# Patient Record
Sex: FEMALE | Race: WHITE | NOT HISPANIC OR LATINO | Employment: PART TIME | URBAN - METROPOLITAN AREA
[De-identification: names, ages, dates, MRNs, and addresses within clinical notes are randomized per-mention and may not be internally consistent; named-entity substitution may affect disease eponyms.]

---

## 2019-05-24 ENCOUNTER — APPOINTMENT (OUTPATIENT)
Dept: OTHER | Facility: HOSPITAL | Age: 39
End: 2019-05-24

## 2019-05-24 ENCOUNTER — APPOINTMENT (OUTPATIENT)
Dept: ONCOLOGY | Facility: CLINIC | Age: 39
End: 2019-05-24

## 2019-06-21 ENCOUNTER — CONSULT (OUTPATIENT)
Dept: ONCOLOGY | Facility: CLINIC | Age: 39
End: 2019-06-21

## 2019-06-21 ENCOUNTER — LAB (OUTPATIENT)
Dept: OTHER | Facility: HOSPITAL | Age: 39
End: 2019-06-21

## 2019-06-21 VITALS
TEMPERATURE: 98.2 F | BODY MASS INDEX: 22.47 KG/M2 | OXYGEN SATURATION: 99 % | WEIGHT: 151.7 LBS | RESPIRATION RATE: 14 BRPM | SYSTOLIC BLOOD PRESSURE: 113 MMHG | HEIGHT: 69 IN | HEART RATE: 78 BPM | DIASTOLIC BLOOD PRESSURE: 78 MMHG

## 2019-06-21 DIAGNOSIS — E83.110 HEREDITARY HEMOCHROMATOSIS (HCC): Primary | ICD-10-CM

## 2019-06-21 DIAGNOSIS — E83.110 HEMOCHROMATOSIS ASSOCIATED WITH MUTATION IN HFE GENE (HCC): Primary | ICD-10-CM

## 2019-06-21 LAB
ALBUMIN SERPL-MCNC: 4.4 G/DL (ref 3.5–5.2)
ALBUMIN/GLOB SERPL: 1.5 G/DL
ALP SERPL-CCNC: 56 U/L (ref 39–117)
ALT SERPL W P-5'-P-CCNC: 11 U/L (ref 1–33)
ANION GAP SERPL CALCULATED.3IONS-SCNC: 7.1 MMOL/L
AST SERPL-CCNC: 13 U/L (ref 1–32)
BASOPHILS # BLD AUTO: 0.04 10*3/MM3 (ref 0–0.2)
BASOPHILS NFR BLD AUTO: 0.7 % (ref 0–1.5)
BILIRUB SERPL-MCNC: 0.3 MG/DL (ref 0.1–1.2)
BUN BLD-MCNC: 10 MG/DL (ref 6–20)
BUN/CREAT SERPL: 11.8 (ref 7–25)
CALCIUM SPEC-SCNC: 9.6 MG/DL (ref 8.6–10.5)
CHLORIDE SERPL-SCNC: 105 MMOL/L (ref 98–107)
CO2 SERPL-SCNC: 27.9 MMOL/L (ref 22–29)
CREAT BLD-MCNC: 0.85 MG/DL (ref 0.57–1)
DEPRECATED RDW RBC AUTO: 40.2 FL (ref 37–54)
EOSINOPHIL # BLD AUTO: 0.28 10*3/MM3 (ref 0–0.4)
EOSINOPHIL NFR BLD AUTO: 4.9 % (ref 0.3–6.2)
ERYTHROCYTE [DISTWIDTH] IN BLOOD BY AUTOMATED COUNT: 12.5 % (ref 12.3–15.4)
FERRITIN SERPL-MCNC: 51.9 NG/ML (ref 13–150)
FOLATE SERPL-MCNC: 6.5 NG/ML (ref 4.78–24.2)
GFR SERPL CREATININE-BSD FRML MDRD: 75 ML/MIN/1.73
GLOBULIN UR ELPH-MCNC: 3 GM/DL
GLUCOSE BLD-MCNC: 92 MG/DL (ref 65–99)
HCT VFR BLD AUTO: 39.5 % (ref 34–46.6)
HGB BLD-MCNC: 13.5 G/DL (ref 12–15.9)
HGB RETIC QN AUTO: 33.9 PG (ref 29.8–36.1)
IMM GRANULOCYTES # BLD AUTO: 0.02 10*3/MM3 (ref 0–0.05)
IMM GRANULOCYTES NFR BLD AUTO: 0.3 % (ref 0–0.5)
IMM RETICS NFR: 6.8 % (ref 3–15.8)
IRON 24H UR-MRATE: 93 MCG/DL (ref 37–145)
IRON SATN MFR SERPL: 40 % (ref 20–50)
LYMPHOCYTES # BLD AUTO: 1.75 10*3/MM3 (ref 0.7–3.1)
LYMPHOCYTES NFR BLD AUTO: 30.5 % (ref 19.6–45.3)
MCH RBC QN AUTO: 29.8 PG (ref 26.6–33)
MCHC RBC AUTO-ENTMCNC: 34.2 G/DL (ref 31.5–35.7)
MCV RBC AUTO: 87.2 FL (ref 79–97)
MONOCYTES # BLD AUTO: 0.45 10*3/MM3 (ref 0.1–0.9)
MONOCYTES NFR BLD AUTO: 7.9 % (ref 5–12)
NEUTROPHILS # BLD AUTO: 3.19 10*3/MM3 (ref 1.7–7)
NEUTROPHILS NFR BLD AUTO: 55.7 % (ref 42.7–76)
NRBC BLD AUTO-RTO: 0 /100 WBC (ref 0–0.2)
PLATELET # BLD AUTO: 207 10*3/MM3 (ref 140–450)
PMV BLD AUTO: 10.6 FL (ref 6–12)
POTASSIUM BLD-SCNC: 5.1 MMOL/L (ref 3.5–5.2)
PROT SERPL-MCNC: 7.4 G/DL (ref 6–8.5)
RBC # BLD AUTO: 4.53 10*6/MM3 (ref 3.77–5.28)
RETICS/RBC NFR AUTO: 1.17 % (ref 0.7–1.9)
SODIUM BLD-SCNC: 140 MMOL/L (ref 136–145)
TIBC SERPL-MCNC: 235 MCG/DL (ref 298–536)
TRANSFERRIN SERPL-MCNC: 158 MG/DL (ref 200–360)
VIT B12 BLD-MCNC: 509 PG/ML (ref 211–946)
WBC NRBC COR # BLD: 5.73 10*3/MM3 (ref 3.4–10.8)

## 2019-06-21 PROCEDURE — 82746 ASSAY OF FOLIC ACID SERUM: CPT | Performed by: INTERNAL MEDICINE

## 2019-06-21 PROCEDURE — 99244 OFF/OP CNSLTJ NEW/EST MOD 40: CPT | Performed by: INTERNAL MEDICINE

## 2019-06-21 PROCEDURE — 80053 COMPREHEN METABOLIC PANEL: CPT | Performed by: INTERNAL MEDICINE

## 2019-06-21 PROCEDURE — 82607 VITAMIN B-12: CPT | Performed by: INTERNAL MEDICINE

## 2019-06-21 PROCEDURE — 82728 ASSAY OF FERRITIN: CPT | Performed by: INTERNAL MEDICINE

## 2019-06-21 PROCEDURE — 84466 ASSAY OF TRANSFERRIN: CPT | Performed by: INTERNAL MEDICINE

## 2019-06-21 PROCEDURE — 36415 COLL VENOUS BLD VENIPUNCTURE: CPT

## 2019-06-21 PROCEDURE — 85025 COMPLETE CBC W/AUTO DIFF WBC: CPT | Performed by: INTERNAL MEDICINE

## 2019-06-21 PROCEDURE — 85046 RETICYTE/HGB CONCENTRATE: CPT | Performed by: INTERNAL MEDICINE

## 2019-06-21 PROCEDURE — 83540 ASSAY OF IRON: CPT | Performed by: INTERNAL MEDICINE

## 2019-06-21 RX ORDER — ASCORBIC ACID 500 MG
1 TABLET ORAL DAILY
COMMUNITY
End: 2020-07-10

## 2019-06-21 RX ORDER — FLUOXETINE HYDROCHLORIDE 40 MG/1
40 CAPSULE ORAL EVERY MORNING
COMMUNITY

## 2019-06-21 RX ORDER — GABAPENTIN 100 MG/1
200 CAPSULE ORAL NIGHTLY
Refills: 2 | COMMUNITY
Start: 2019-04-30 | End: 2022-09-19

## 2019-06-21 RX ORDER — BUTALBITAL, ACETAMINOPHEN AND CAFFEINE 300; 40; 50 MG/1; MG/1; MG/1
1 CAPSULE ORAL EVERY 4 HOURS PRN
COMMUNITY

## 2019-06-21 RX ORDER — TRAZODONE HYDROCHLORIDE 50 MG/1
50 TABLET ORAL EVERY EVENING
Refills: 2 | COMMUNITY
Start: 2019-03-29 | End: 2020-07-10

## 2019-06-21 RX ORDER — PROPRANOLOL HYDROCHLORIDE 10 MG/1
10 TABLET ORAL
Refills: 3 | COMMUNITY
Start: 2019-04-11 | End: 2020-07-10

## 2019-06-21 NOTE — PROGRESS NOTES
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    HISTORY OF PRESENT ILLNESS:  The patient is a 39 y.o. year old female who is here for an opinion about the above issue.  And undergone a hemochromatosis gene mutation assay at [facility] in the past and was found to be homozygous for the C282Y mutation.  In spite of this her iron stores did not appear to be significantly abnormal and after just a couple of phlebotomies she became symptomatically iron deficient with a ferritin of 14 in March of this year.  Her hemoglobin also declined to 11-1/2 g/dL.    She is here today for a second opinion and her hemoglobin now is back in the normal range at 13.5 g/dL.  We are drawing repeat iron studies today but these are pending.  She is still premenopausal with periods lasting 7 to 8 days although the flow and schedule can be erratic.  She does not drink alcohol.  She is not aware of any family history of insulin-dependent diabetes or cirrhosis of the liver except for her paternal grandfather who was also an alcoholic.    History of Present Illness     Past Medical History:   Diagnosis Date   • Anxiety    • Chronic cholecystitis    • Depression    • Hereditary hemochromatosis (CMS/HCC)    • History of chicken pox    • History of IBS    • History of migraines    • Latex allergy    • Osteoporosis         Past Surgical History:   Procedure Laterality Date   •  SECTION  ,    • CHOLECYSTECTOMY     • INNER EAR SURGERY Bilateral ,     Tympanoplasty   • TYMPANOPLASTY Bilateral         Current Outpatient Medications on File Prior to Visit   Medication Sig  Dispense Refill   • ASPIRIN 81 PO Take  by mouth.     • butalbital-acetaminophen-caffeine (FIORICET) -40 MG capsule capsule Take 1 capsule by mouth Every 4 (Four) Hours As Needed.     • FLUoxetine (PROzac) 40 MG capsule Take 40 mg by mouth Daily.     • gabapentin (NEURONTIN) 100 MG capsule Take 100 mg by mouth Every Night.  2   • Multiple Vitamin (MULTIVITAMIN) tablet tablet Take 1 tablet by mouth Daily.     • propranolol (INDERAL) 10 MG tablet Take 10 mg by mouth every night at bedtime.  3   • traZODone (DESYREL) 50 MG tablet Take 50 mg by mouth Every Evening.  2     No current facility-administered medications on file prior to visit.         ALLERGIES:    Allergies   Allergen Reactions   • Mefenamic Acid Swelling     Facial swelling   • Latex Other (See Comments)     sensitive   • Sulfa Antibiotics Rash     Had at a young age vomiting and rash  rash & vomting          Social History     Socioeconomic History   • Marital status:      Spouse name: Not on file   • Number of children: Not on file   • Years of education: Not on file   • Highest education level: Not on file   Occupational History     Employer: [employer]   Tobacco Use   • Smoking status: Former Smoker     Years: 10.00   • Smokeless tobacco: Never Used   Substance and Sexual Activity   • Alcohol use: No     Frequency: Never   • Drug use: No   • Sexual activity: Defer        Family History   Problem Relation Age of Onset   • Hypertension Mother    • Hypertension Father    • Cancer Maternal Grandfather         thyroid        Review of Systems   Constitutional: Positive for fatigue. Negative for activity change, chills and fever.   HENT: Positive for hearing loss. Negative for mouth sores, trouble swallowing and voice change.    Eyes: Negative for pain and visual disturbance.   Respiratory: Negative for cough, shortness of breath and wheezing.    Cardiovascular: Negative for chest pain and palpitations.   Gastrointestinal: Positive for  "constipation. Negative for abdominal pain, diarrhea, nausea and vomiting.   Genitourinary: Negative for difficulty urinating, frequency and urgency.   Musculoskeletal: Positive for arthralgias and joint swelling.   Skin: Negative for rash.   Neurological: Positive for headaches. Negative for dizziness, seizures and weakness.   Hematological: Negative for adenopathy. Does not bruise/bleed easily.   Psychiatric/Behavioral: Negative for behavioral problems and confusion. The patient is not nervous/anxious.         Objective     Vitals:    06/21/19 0911   BP: 113/78   Pulse: 78   Resp: 14   Temp: 98.2 °F (36.8 °C)   SpO2: 99%   Weight: 68.8 kg (151 lb 11.2 oz)   Height: 174 cm (68.5\")   PainSc:   2   PainLoc: Comment: Joint pain, hips/kness/back, always.     Current Status 6/21/2019   ECOG score 0       Physical Exam   Constitutional: She is oriented to person, place, and time. She appears well-developed and well-nourished. No distress.   HENT:   Head: Normocephalic.   Eyes: Conjunctivae and EOM are normal. Pupils are equal, round, and reactive to light. No scleral icterus.   Neck: Normal range of motion. Neck supple. No JVD present. No thyromegaly present.   Cardiovascular: Normal rate and regular rhythm. Exam reveals no gallop and no friction rub.   No murmur heard.  Pulmonary/Chest: Effort normal and breath sounds normal. She has no wheezes. She has no rales.   Abdominal: Soft. She exhibits no distension and no mass. There is no tenderness.   Musculoskeletal: Normal range of motion. She exhibits no edema or deformity.   Lymphadenopathy:     She has no cervical adenopathy.   Neurological: She is alert and oriented to person, place, and time. She has normal reflexes. No cranial nerve deficit.   Skin: Skin is warm and dry. No rash noted. No erythema.   Psychiatric: She has a normal mood and affect. Her behavior is normal. Judgment normal.         RECENT LABS:  Hematology WBC   Date Value Ref Range Status   06/21/2019 " 5.73 3.40 - 10.80 10*3/mm3 Final   03/12/2019 5.88 4.5 - 11.0 10*3/uL Final   02/20/2019 6.63 4.5 - 11.0 10*3/uL Final     RBC   Date Value Ref Range Status   06/21/2019 4.53 3.77 - 5.28 10*6/mm3 Final   03/12/2019 3.99 (L) 4.0 - 5.2 10*6/uL Final     Hemoglobin   Date Value Ref Range Status   06/21/2019 13.5 12.0 - 15.9 g/dL Final   03/12/2019 11.9 (L) 12.0 - 16.0 g/dL Final   02/06/2019 11.7 (L) 12.0 - 16.0 g/dL Final     Hematocrit   Date Value Ref Range Status   06/21/2019 39.5 34.0 - 46.6 % Final   03/12/2019 36.3 36.0 - 46.0 % Final     Platelets   Date Value Ref Range Status   06/21/2019 207 140 - 450 10*3/mm3 Final   03/12/2019 191 140 - 440 10*3/uL Final   02/06/2019 185 140 - 440 10*3/uL Final      No results found for: IRON, TIBC, FERRITIN      Assessment/Plan   1.  Patient is homozygous for the C282Y hemochromatosis mutation but does not have clinical evidence of significant iron overload.  We explained to the patient that not everyone who is homozygous for this mutation has clinical hemochromatosis although it is also possible that her monthly periods may be helping to keep her iron status and check.  It is possible that her iron storage may be more significantly impacted when she is postmenopausal.  2.  Iron deficiency anemia from prior phlebotomy.  Her anemia has resolved with a hemoglobin today of 13.5.  Her repeat iron studies are pending  3.  Chronic arthralgias.  I explained to the patient that I do not think this is related to iron deposition since she does not have other signs of significant iron overload.    Recommendations  1.  We will repeat her iron studies today as well as other anemia labs including B12 and folate levels and a retic count.  2.  We will plan to see the patient back in our office in 4 to 6 weeks for repeat blood count and repeat iron studies.  3.  For now we do not have any plans to reinitiate therapeutic phlebotomy but feel that we should continue to observe this for now  checking her ferritin level and iron profile every few months.  4.  We spent a good deal of the visit today also discussing the genetic implications of this disease and the fact that her siblings and her children should be tested at some point.  We also mentioned that if they do not want to undergo the genetic testing that their physicians could check a ferritin level as a screening test.    Thanks for allowing us to see this nice patient in consultation.

## 2019-07-26 ENCOUNTER — OFFICE VISIT (OUTPATIENT)
Dept: ONCOLOGY | Facility: CLINIC | Age: 39
End: 2019-07-26

## 2019-07-26 ENCOUNTER — LAB (OUTPATIENT)
Dept: OTHER | Facility: HOSPITAL | Age: 39
End: 2019-07-26

## 2019-07-26 VITALS
HEART RATE: 87 BPM | OXYGEN SATURATION: 99 % | TEMPERATURE: 97.8 F | WEIGHT: 151.4 LBS | DIASTOLIC BLOOD PRESSURE: 84 MMHG | RESPIRATION RATE: 16 BRPM | SYSTOLIC BLOOD PRESSURE: 118 MMHG | BODY MASS INDEX: 22.42 KG/M2 | HEIGHT: 69 IN

## 2019-07-26 DIAGNOSIS — E83.110 HEREDITARY HEMOCHROMATOSIS (HCC): Primary | ICD-10-CM

## 2019-07-26 PROBLEM — E83.119 HEMOCHROMATOSIS: Status: ACTIVE | Noted: 2019-07-26

## 2019-07-26 LAB
BASOPHILS # BLD AUTO: 0.03 10*3/MM3 (ref 0–0.2)
BASOPHILS NFR BLD AUTO: 0.5 % (ref 0–1.5)
DEPRECATED RDW RBC AUTO: 43.3 FL (ref 37–54)
EOSINOPHIL # BLD AUTO: 0.28 10*3/MM3 (ref 0–0.4)
EOSINOPHIL NFR BLD AUTO: 4.7 % (ref 0.3–6.2)
ERYTHROCYTE [DISTWIDTH] IN BLOOD BY AUTOMATED COUNT: 13.1 % (ref 12.3–15.4)
FERRITIN SERPL-MCNC: 39.2 NG/ML (ref 13–150)
HCT VFR BLD AUTO: 39 % (ref 34–46.6)
HGB BLD-MCNC: 13 G/DL (ref 12–15.9)
IMM GRANULOCYTES # BLD AUTO: 0.01 10*3/MM3 (ref 0–0.05)
IMM GRANULOCYTES NFR BLD AUTO: 0.2 % (ref 0–0.5)
IRON 24H UR-MRATE: 108 MCG/DL (ref 37–145)
IRON SATN MFR SERPL: 47 % (ref 20–50)
LYMPHOCYTES # BLD AUTO: 1.38 10*3/MM3 (ref 0.7–3.1)
LYMPHOCYTES NFR BLD AUTO: 23 % (ref 19.6–45.3)
MCH RBC QN AUTO: 30.2 PG (ref 26.6–33)
MCHC RBC AUTO-ENTMCNC: 33.3 G/DL (ref 31.5–35.7)
MCV RBC AUTO: 90.5 FL (ref 79–97)
MONOCYTES # BLD AUTO: 0.42 10*3/MM3 (ref 0.1–0.9)
MONOCYTES NFR BLD AUTO: 7 % (ref 5–12)
NEUTROPHILS # BLD AUTO: 3.88 10*3/MM3 (ref 1.7–7)
NEUTROPHILS NFR BLD AUTO: 64.6 % (ref 42.7–76)
NRBC BLD AUTO-RTO: 0 /100 WBC (ref 0–0.2)
PLATELET # BLD AUTO: 173 10*3/MM3 (ref 140–450)
PMV BLD AUTO: 10.6 FL (ref 6–12)
RBC # BLD AUTO: 4.31 10*6/MM3 (ref 3.77–5.28)
TIBC SERPL-MCNC: 229 MCG/DL (ref 298–536)
TRANSFERRIN SERPL-MCNC: 154 MG/DL (ref 200–360)
WBC NRBC COR # BLD: 6 10*3/MM3 (ref 3.4–10.8)

## 2019-07-26 PROCEDURE — 85025 COMPLETE CBC W/AUTO DIFF WBC: CPT | Performed by: INTERNAL MEDICINE

## 2019-07-26 PROCEDURE — 82728 ASSAY OF FERRITIN: CPT | Performed by: INTERNAL MEDICINE

## 2019-07-26 PROCEDURE — 36415 COLL VENOUS BLD VENIPUNCTURE: CPT

## 2019-07-26 PROCEDURE — 99214 OFFICE O/P EST MOD 30 MIN: CPT | Performed by: INTERNAL MEDICINE

## 2019-07-26 PROCEDURE — 84466 ASSAY OF TRANSFERRIN: CPT | Performed by: INTERNAL MEDICINE

## 2019-07-26 PROCEDURE — 83540 ASSAY OF IRON: CPT | Performed by: INTERNAL MEDICINE

## 2019-07-26 RX ORDER — FEXOFENADINE HYDROCHLORIDE 60 MG/1
60 TABLET, FILM COATED ORAL AS NEEDED
COMMUNITY
End: 2020-07-21

## 2019-07-26 NOTE — PROGRESS NOTES
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 year old female who is here for an opinion about the above issue.  And undergone a hemochromatosis gene mutation assay at [facility] in the past and was found to be homozygous for the C282Y mutation.  In spite of this her iron stores did not appear to be significantly abnormal and after just a couple of phlebotomies she became symptomatically iron deficient with a ferritin of 14 in March of this year.  Her hemoglobin also declined to 11-1/2 g/dL.    She is here today for a second opinion and her hemoglobin now is back in the normal range at 13.5 g/dL.  We are drawing repeat iron studies today but these are pending.  She is still premenopausal with periods lasting 7 to 8 days although the flow and schedule can be erratic.  She does not drink alcohol.  She is not aware of any family history of insulin-dependent diabetes or cirrhosis of the liver except for her paternal grandfather who was also an alcoholic.    History of Present Illness     Past Medical History:   Diagnosis Date   • Anxiety    • Chronic cholecystitis    • Depression    • Hereditary hemochromatosis (CMS/HCC)    • History of chicken pox    • History of IBS    • History of migraines    • [place] (hard of hearing)     Due to ear history, surgery x4   • Latex allergy    • Osteoarthritis    • Osteoporosis    • Tattoos         Past Surgical History:   Procedure Laterality Date   • ABDOMINAL WALL SURGERY  2017    Endometrioma removed from abdominal wall/ scar   •  SECTION  ,    • CHOLECYSTECTOMY     • INNER EAR SURGERY Bilateral ,     Tympanoplasty   • TYMPANOPLASTY Bilateral         Current Outpatient Medications on File Prior to Visit   Medication Sig Dispense Refill   • ASPIRIN 81 PO Take  by mouth.     •  butalbital-acetaminophen-caffeine (FIORICET) -40 MG capsule capsule Take 1 capsule by mouth Every 4 (Four) Hours As Needed.     • fexofenadine (ALLEGRA) 60 MG tablet Take  by mouth.     • FLUoxetine (PROzac) 40 MG capsule Take 40 mg by mouth Daily.     • gabapentin (NEURONTIN) 100 MG capsule Take 100 mg by mouth Every Night.  2   • propranolol (INDERAL) 10 MG tablet Take 10 mg by mouth every night at bedtime.  3   • traZODone (DESYREL) 50 MG tablet Take 50 mg by mouth Every Evening.  2   • Multiple Vitamin (MULTIVITAMIN) tablet tablet Take 1 tablet by mouth Daily.       No current facility-administered medications on file prior to visit.         ALLERGIES:    Allergies   Allergen Reactions   • Mefenamic Acid Swelling     Facial swelling   • Latex Other (See Comments)     sensitive   • Sulfa Antibiotics Rash     Had at a young age vomiting and rash  rash & vomting          Social History     Socioeconomic History   • Marital status:      Spouse name: Storm   • Number of children: 2   • Years of education: College   • Highest education level: Not on file   Occupational History     Employer: [employer]   • Occupation: Surgery Coordinator     Employer: [employer]   Tobacco Use   • Smoking status: Former Smoker     Packs/day: 0.50     Years: 10.00     Pack years: 5.00     Types: Cigarettes   • Smokeless tobacco: Never Used   Substance and Sexual Activity   • Alcohol use: No     Frequency: Never   • Drug use: No   • Sexual activity: Defer        Family History   Problem Relation Age of Onset   • Hypertension Mother    • Hypertension Father    • Cancer Maternal Grandfather         thyroid        Review of Systems   Constitutional: Positive for fatigue. Negative for activity change, chills and fever.   HENT: Positive for hearing loss. Negative for mouth sores, trouble swallowing and voice change.    Eyes: Negative for pain and visual disturbance.   Respiratory: Negative for cough, shortness of  "breath and wheezing.    Cardiovascular: Negative for chest pain and palpitations.   Gastrointestinal: Positive for constipation. Negative for abdominal pain, diarrhea, nausea and vomiting.   Genitourinary: Negative for difficulty urinating, frequency and urgency.   Musculoskeletal: Positive for arthralgias and joint swelling.   Skin: Negative for rash.   Neurological: Positive for headaches. Negative for dizziness, seizures and weakness.   Hematological: Negative for adenopathy. Does not bruise/bleed easily.   Psychiatric/Behavioral: Negative for behavioral problems and confusion. The patient is not nervous/anxious.         Objective     Vitals:    07/26/19 1129   BP: 118/84   Pulse: 87   Resp: 16   Temp: 97.8 °F (36.6 °C)   TempSrc: Oral   SpO2: 99%   Weight: 68.7 kg (151 lb 6.4 oz)   Height: 174 cm (68.5\")   PainSc: 0-No pain     Current Status 7/26/2019   ECOG score 0       Physical Exam   Constitutional: She is oriented to person, place, and time. She appears well-developed and well-nourished. No distress.   HENT:   Head: Normocephalic.   Eyes: Conjunctivae and EOM are normal. Pupils are equal, round, and reactive to light. No scleral icterus.   Neck: Normal range of motion. Neck supple. No JVD present. No thyromegaly present.   Cardiovascular: Normal rate and regular rhythm. Exam reveals no gallop and no friction rub.   No murmur heard.  Pulmonary/Chest: Effort normal and breath sounds normal. She has no wheezes. She has no rales.   Abdominal: Soft. She exhibits no distension and no mass. There is no tenderness.   Musculoskeletal: Normal range of motion. She exhibits no edema or deformity.   Lymphadenopathy:     She has no cervical adenopathy.   Neurological: She is alert and oriented to person, place, and time. She has normal reflexes. No cranial nerve deficit.   Skin: Skin is warm and dry. No rash noted. No erythema.   Psychiatric: She has a normal mood and affect. Her behavior is normal. Judgment normal.     "     RECENT LABS:  Hematology WBC   Date Value Ref Range Status   07/26/2019 6.00 3.40 - 10.80 10*3/mm3 Final   03/12/2019 5.88 4.5 - 11.0 10*3/uL Final   02/20/2019 6.63 4.5 - 11.0 10*3/uL Final     RBC   Date Value Ref Range Status   07/26/2019 4.31 3.77 - 5.28 10*6/mm3 Final   03/12/2019 3.99 (L) 4.0 - 5.2 10*6/uL Final     Hemoglobin   Date Value Ref Range Status   07/26/2019 13.0 12.0 - 15.9 g/dL Final   03/12/2019 11.9 (L) 12.0 - 16.0 g/dL Final   02/06/2019 11.7 (L) 12.0 - 16.0 g/dL Final     Hematocrit   Date Value Ref Range Status   07/26/2019 39.0 34.0 - 46.6 % Final   03/12/2019 36.3 36.0 - 46.0 % Final   01/22/2019 36.9 36.0 - 46.0 % Final     Platelets   Date Value Ref Range Status   07/26/2019 173 140 - 450 10*3/mm3 Final   03/12/2019 191 140 - 440 10*3/uL Final   02/06/2019 185 140 - 440 10*3/uL Final        Lab Results   Component Value Date    IRON 108 07/26/2019    TIBC 229 (L) 07/26/2019    FERRITIN 39.20 07/26/2019         Assessment/Plan   1.  Patient is homozygous for the C282Y hemochromatosis mutation but does not have clinical evidence of significant iron overload.  We explained to the patient that not everyone who is homozygous for this mutation has clinical hemochromatosis although it is also possible that her monthly periods may be helping to keep her iron status and check.  It is possible that her iron storage may be more significantly impacted when she is postmenopausal.  2.  Iron deficiency anemia from prior phlebotomy.  Her anemia has resolved   3.  Chronic arthralgias.  I explained to the patient that I do not think this is related to iron deposition since she does not have other signs of significant iron overload.    Plan  1.   We will plan for lab and possible phlebotomy in 3 months  2.  MD plus lab and possible phlebotomy in 6 months.

## 2019-10-18 ENCOUNTER — APPOINTMENT (OUTPATIENT)
Dept: OTHER | Facility: HOSPITAL | Age: 39
End: 2019-10-18

## 2019-10-18 ENCOUNTER — APPOINTMENT (OUTPATIENT)
Dept: ONCOLOGY | Facility: HOSPITAL | Age: 39
End: 2019-10-18

## 2019-12-04 ENCOUNTER — OFFICE VISIT (OUTPATIENT)
Dept: SURGERY | Facility: CLINIC | Age: 39
End: 2019-12-04

## 2019-12-04 ENCOUNTER — PREP FOR SURGERY (OUTPATIENT)
Dept: OTHER | Facility: HOSPITAL | Age: 39
End: 2019-12-04

## 2019-12-04 VITALS
BODY MASS INDEX: 23.7 KG/M2 | HEIGHT: 67 IN | DIASTOLIC BLOOD PRESSURE: 82 MMHG | SYSTOLIC BLOOD PRESSURE: 134 MMHG | OXYGEN SATURATION: 99 % | HEART RATE: 76 BPM | WEIGHT: 151 LBS

## 2019-12-04 DIAGNOSIS — R10.9 ABDOMINAL PAIN: Primary | ICD-10-CM

## 2019-12-04 DIAGNOSIS — R10.2 SUPRAPUBIC PAIN: Primary | ICD-10-CM

## 2019-12-04 PROCEDURE — 99204 OFFICE O/P NEW MOD 45 MIN: CPT | Performed by: SURGERY

## 2019-12-04 NOTE — PROGRESS NOTES
"Subjective   Jacqueline Coronel is a 39 y.o. female.   Chief Complaint   Patient presents with   • Abdominal Pain     /82 (BP Location: Left arm, Patient Position: Sitting, Cuff Size: Adult)   Pulse 76   Ht 170.2 cm (67\")   Wt 68.5 kg (151 lb)   SpO2 99%   BMI 23.65 kg/m²     HISTORY OF PRESENT ILLNESS:  Patient is a pleasant 39-year-old female accompanied by her .  She has undergone 2 C-sections in the past, and at this time is complaining of approximately a year history of pain around her  incision.  It is around her pubic bone.  It is moderate to severe.  Her pain is exacerbated with palpation or leaning over a counter with pressure on the incision area.  Her pain is also exacerbated with menstruation.  She also has history of endometrioma and wound infection of the  incision in the past.  She describes her pain as tight pulling and occasionally deep ripping with lifting.  She also occasionally has the sensation of needing to urinate accompanying these symptoms.    The following portions of the patient's history were reviewed and updated as appropriate: allergies, current medications, past family history, past medical history, past social history, past surgical history and problem list.    Review of Systems   Constitutional: Negative for fever.   Eyes: Negative for blurred vision.   Respiratory: Negative for shortness of breath.    Cardiovascular: Negative for chest pain.   Gastrointestinal: Positive for abdominal pain.   Genitourinary: Positive for pelvic pain.   Musculoskeletal: Negative for arthralgias.   Skin: Negative for rash.   Neurological: Positive for headache.   Psychiatric/Behavioral: Positive for depressed mood.     I have reviewed the above ROS and updated as appropriate.    Objective   Physical Exam   Constitutional: She appears well-developed and well-nourished. No distress.   HENT:   Head: Normocephalic and atraumatic.   Within normal limits   Eyes: EOM are normal. "   Neck: Normal range of motion.   Cardiovascular: Normal rate.   Good color; no pallor or cyanosis   Pulmonary/Chest: Effort normal. No respiratory distress.   Abdominal: Soft. There is tenderness in the suprapubic area.   Suprapubic incision consistent with    Musculoskeletal: Normal range of motion.   Neurological: She is alert. No cranial nerve deficit.   Skin: Skin is warm and dry.   Psychiatric: She has a normal mood and affect. Her behavior is normal.   Vitals reviewed.        Assessment/Plan   Jacqueline was seen today for abdominal pain.    Diagnoses and all orders for this visit:    Suprapubic pain    Discussed with patient that it is reasonable to suspect that the symptoms are related to adhesions in the pelvis    Patient would like to proceed with surgery    Schedule laparoscopic lysis of adhesions    Discussed risks of general anesthesia, bleeding, infection, and injury to surrounding structures, as well as risk of conversion to the open procedure.    We will abort procedure if not emergent open procedure is necessary due to patient's desires to have that procedure done at a different time.    Jacqueline Siddiqui PA-C, 2019 4:12 PM, acting as scribe for Dr. Donovan.  I, Corine Donovan MD, personally performed the services described in this documentation as scribed by the above named individual in my presence, and it is both accurate and complete.  2019  4:22 PM     I think her symptoms are likely attributable to adhesive disease and we will proceed with lap scopic lysis of adhesions.  We will abort open procedure if necessary unless there is no iatrogenic injury due to her special needs child at home.  Discussed at length the risk of general anesthesia bleeding infection or injury to surrounding structures both with the patient and her  who are present at the bedside.

## 2019-12-04 NOTE — H&P
Subjective   Jacqueline Coronel is a 39 y.o. female.   No chief complaint on file.    There were no vitals taken for this visit.    HISTORY OF PRESENT ILLNESS:  Patient is a pleasant 39-year-old female accompanied by her .  She has undergone 2 C-sections in the past, and at this time is complaining of approximately a year history of pain around her  incision.  It is around her pubic bone.  It is moderate to severe.  Her pain is exacerbated with palpation or leaning over a counter with pressure on the incision area.  Her pain is also exacerbated with menstruation.  She also has history of endometrioma and wound infection of the  incision in the past.  She describes her pain as tight pulling and occasionally deep ripping with lifting.  She also occasionally has the sensation of needing to urinate accompanying these symptoms.    The following portions of the patient's history were reviewed and updated as appropriate: allergies, current medications, past family history, past medical history, past social history, past surgical history and problem list.    Review of Systems   Constitutional: Negative for fever.   Eyes: Negative for blurred vision.   Respiratory: Negative for shortness of breath.    Cardiovascular: Negative for chest pain.   Gastrointestinal: Positive for abdominal pain.   Genitourinary: Positive for pelvic pain.   Musculoskeletal: Negative for arthralgias.   Skin: Negative for rash.   Neurological: Positive for headache.   Psychiatric/Behavioral: Positive for depressed mood.     I have reviewed the above ROS and updated as appropriate.    Objective   Physical Exam   Constitutional: She appears well-developed and well-nourished. No distress.   HENT:   Head: Normocephalic and atraumatic.   Within normal limits   Eyes: EOM are normal.   Neck: Normal range of motion.   Cardiovascular: Normal rate.   Good color; no pallor or cyanosis   Pulmonary/Chest: Effort normal. No respiratory distress.    Abdominal: Soft. There is tenderness in the suprapubic area.   Suprapubic incision consistent with    Musculoskeletal: Normal range of motion.   Neurological: She is alert. No cranial nerve deficit.   Skin: Skin is warm and dry.   Psychiatric: She has a normal mood and affect. Her behavior is normal.   Vitals reviewed.        Assessment/Plan   Diagnoses and all orders for this visit:    Abdominal pain  -     Case Request; Standing  -     ceFAZolin (ANCEF) in SWFI 2 g/20ml IV PUSH syringe  -     Case Request    Other orders  -     Follow Anesthesia Guidelines / Standing Orders; Future  -     Provide NPO Instructions to Patient; Future  -     Follow Anesthesia Guidelines / Standing Orders; Standing  -     Obtain Informed Consent; Standing  -     Verify / Perform Chlorhexidine Skin Prep; Standing  -     Verify / Perform Chlorhexidine Skin Prep if Indicated (If Not Already Completed); Standing  -     ECG 12 Lead; Standing    Discussed with patient that it is reasonable to suspect that the symptoms are related to adhesions in the pelvis    Patient would like to proceed with surgery    Schedule laparoscopic lysis of adhesions    Discussed risks of general anesthesia, bleeding, infection, and injury to surrounding structures, as well as risk of conversion to the open procedure.    We will abort procedure if not emergent open procedure is necessary due to patient's desires to have that procedure done at a different time.    Corine Donovan MD, 2019 4:12 PM, acting as scribe for Dr. Donovan.  I, Corine Donovan MD, personally performed the services described in this documentation as scribed by the above named individual in my presence, and it is both accurate and complete.  2019  4:22 PM     I think her symptoms are likely attributable to adhesive disease and we will proceed with lap scopic lysis of adhesions.  We will abort open procedure if necessary unless there is no iatrogenic injury due to her  special needs child at home.  Discussed at length the risk of general anesthesia bleeding infection or injury to surrounding structures both with the patient and her  who are present at the bedside.

## 2019-12-13 ENCOUNTER — APPOINTMENT (OUTPATIENT)
Dept: PREADMISSION TESTING | Facility: HOSPITAL | Age: 39
End: 2019-12-13

## 2019-12-13 VITALS
HEIGHT: 67 IN | HEART RATE: 78 BPM | OXYGEN SATURATION: 99 % | SYSTOLIC BLOOD PRESSURE: 132 MMHG | DIASTOLIC BLOOD PRESSURE: 80 MMHG | BODY MASS INDEX: 23.7 KG/M2 | WEIGHT: 151 LBS

## 2019-12-13 LAB
DEPRECATED RDW RBC AUTO: 44.2 FL (ref 37–54)
ERYTHROCYTE [DISTWIDTH] IN BLOOD BY AUTOMATED COUNT: 13.8 % (ref 12.3–15.4)
HCT VFR BLD AUTO: 40.2 % (ref 34–46.6)
HGB BLD-MCNC: 13.7 G/DL (ref 12–15.9)
MCH RBC QN AUTO: 31.2 PG (ref 26.6–33)
MCHC RBC AUTO-ENTMCNC: 34.1 G/DL (ref 31.5–35.7)
MCV RBC AUTO: 91.6 FL (ref 79–97)
PLATELET # BLD AUTO: 213 10*3/MM3 (ref 140–450)
PMV BLD AUTO: 9.4 FL (ref 6–12)
RBC # BLD AUTO: 4.39 10*6/MM3 (ref 3.77–5.28)
WBC NRBC COR # BLD: 7 10*3/MM3 (ref 3.4–10.8)

## 2019-12-13 PROCEDURE — 85027 COMPLETE CBC AUTOMATED: CPT | Performed by: SURGERY

## 2019-12-13 PROCEDURE — 36415 COLL VENOUS BLD VENIPUNCTURE: CPT

## 2019-12-13 PROCEDURE — 93010 ELECTROCARDIOGRAM REPORT: CPT | Performed by: INTERNAL MEDICINE

## 2019-12-13 PROCEDURE — 93005 ELECTROCARDIOGRAM TRACING: CPT

## 2019-12-18 ENCOUNTER — ANESTHESIA EVENT (OUTPATIENT)
Dept: PERIOP | Facility: HOSPITAL | Age: 39
End: 2019-12-18

## 2019-12-19 ENCOUNTER — HOSPITAL ENCOUNTER (OUTPATIENT)
Facility: HOSPITAL | Age: 39
Setting detail: HOSPITAL OUTPATIENT SURGERY
Discharge: HOME | End: 2019-12-19
Attending: SURGERY | Admitting: SURGERY

## 2019-12-19 ENCOUNTER — ANESTHESIA (OUTPATIENT)
Dept: PERIOP | Facility: HOSPITAL | Age: 39
End: 2019-12-19

## 2019-12-19 VITALS
BODY MASS INDEX: 23.08 KG/M2 | HEART RATE: 61 BPM | TEMPERATURE: 98 F | DIASTOLIC BLOOD PRESSURE: 66 MMHG | OXYGEN SATURATION: 99 % | SYSTOLIC BLOOD PRESSURE: 104 MMHG | WEIGHT: 147.05 LBS | HEIGHT: 67 IN | RESPIRATION RATE: 16 BRPM

## 2019-12-19 DIAGNOSIS — R10.9 ABDOMINAL PAIN: ICD-10-CM

## 2019-12-19 DIAGNOSIS — R10.2 SUPRAPUBIC PAIN: Primary | ICD-10-CM

## 2019-12-19 LAB — B-HCG UR QL: NEGATIVE

## 2019-12-19 PROCEDURE — 25010000002 DEXAMETHASONE PER 1 MG: Performed by: ANESTHESIOLOGY

## 2019-12-19 PROCEDURE — 49320 DIAG LAPARO SEPARATE PROC: CPT | Performed by: PHYSICIAN ASSISTANT

## 2019-12-19 PROCEDURE — 49320 DIAG LAPARO SEPARATE PROC: CPT | Performed by: SURGERY

## 2019-12-19 PROCEDURE — 25010000002 PROPOFOL 10 MG/ML EMULSION: Performed by: ANESTHESIOLOGY

## 2019-12-19 PROCEDURE — 81025 URINE PREGNANCY TEST: CPT | Performed by: SURGERY

## 2019-12-19 PROCEDURE — 25010000002 ONDANSETRON PER 1 MG: Performed by: ANESTHESIOLOGY

## 2019-12-19 PROCEDURE — 25010000002 LORAZEPAM PER 2 MG: Performed by: ANESTHESIOLOGY

## 2019-12-19 PROCEDURE — 25010000002 MIDAZOLAM PER 1 MG: Performed by: ANESTHESIOLOGY

## 2019-12-19 PROCEDURE — 25010000002 FENTANYL CITRATE (PF) 100 MCG/2ML SOLUTION: Performed by: ANESTHESIOLOGY

## 2019-12-19 RX ORDER — OXYCODONE HYDROCHLORIDE AND ACETAMINOPHEN 5; 325 MG/1; MG/1
1 TABLET ORAL EVERY 6 HOURS PRN
Qty: 30 TABLET | Refills: 0 | Status: SHIPPED | OUTPATIENT
Start: 2019-12-19 | End: 2020-01-07

## 2019-12-19 RX ORDER — PROMETHAZINE HYDROCHLORIDE 25 MG/1
25 SUPPOSITORY RECTAL ONCE AS NEEDED
Status: DISCONTINUED | OUTPATIENT
Start: 2019-12-19 | End: 2019-12-19 | Stop reason: HOSPADM

## 2019-12-19 RX ORDER — NALOXONE HCL 0.4 MG/ML
0.4 VIAL (ML) INJECTION AS NEEDED
Status: DISCONTINUED | OUTPATIENT
Start: 2019-12-19 | End: 2019-12-19 | Stop reason: HOSPADM

## 2019-12-19 RX ORDER — PROMETHAZINE HYDROCHLORIDE 25 MG/1
25 TABLET ORAL ONCE AS NEEDED
Status: DISCONTINUED | OUTPATIENT
Start: 2019-12-19 | End: 2019-12-19 | Stop reason: HOSPADM

## 2019-12-19 RX ORDER — ONDANSETRON 2 MG/ML
4 INJECTION INTRAMUSCULAR; INTRAVENOUS ONCE
Status: COMPLETED | OUTPATIENT
Start: 2019-12-19 | End: 2019-12-19

## 2019-12-19 RX ORDER — MIDAZOLAM HYDROCHLORIDE 1 MG/ML
INJECTION INTRAMUSCULAR; INTRAVENOUS AS NEEDED
Status: DISCONTINUED | OUTPATIENT
Start: 2019-12-19 | End: 2019-12-19 | Stop reason: SURG

## 2019-12-19 RX ORDER — FENTANYL CITRATE 50 UG/ML
INJECTION, SOLUTION INTRAMUSCULAR; INTRAVENOUS AS NEEDED
Status: DISCONTINUED | OUTPATIENT
Start: 2019-12-19 | End: 2019-12-19 | Stop reason: SURG

## 2019-12-19 RX ORDER — SODIUM CHLORIDE 0.9 % (FLUSH) 0.9 %
10 SYRINGE (ML) INJECTION EVERY 12 HOURS SCHEDULED
Status: DISCONTINUED | OUTPATIENT
Start: 2019-12-19 | End: 2019-12-19 | Stop reason: HOSPADM

## 2019-12-19 RX ORDER — LABETALOL HYDROCHLORIDE 5 MG/ML
5 INJECTION, SOLUTION INTRAVENOUS
Status: DISCONTINUED | OUTPATIENT
Start: 2019-12-19 | End: 2019-12-19 | Stop reason: HOSPADM

## 2019-12-19 RX ORDER — OXYCODONE HYDROCHLORIDE 5 MG/1
7.5 TABLET ORAL ONCE AS NEEDED
Status: DISCONTINUED | OUTPATIENT
Start: 2019-12-19 | End: 2019-12-19 | Stop reason: HOSPADM

## 2019-12-19 RX ORDER — BUPIVACAINE HYDROCHLORIDE AND EPINEPHRINE 2.5; 5 MG/ML; UG/ML
INJECTION, SOLUTION EPIDURAL; INFILTRATION; INTRACAUDAL; PERINEURAL AS NEEDED
Status: DISCONTINUED | OUTPATIENT
Start: 2019-12-19 | End: 2019-12-19 | Stop reason: HOSPADM

## 2019-12-19 RX ORDER — IPRATROPIUM BROMIDE AND ALBUTEROL SULFATE 2.5; .5 MG/3ML; MG/3ML
3 SOLUTION RESPIRATORY (INHALATION) ONCE AS NEEDED
Status: DISCONTINUED | OUTPATIENT
Start: 2019-12-19 | End: 2019-12-19 | Stop reason: HOSPADM

## 2019-12-19 RX ORDER — PROMETHAZINE HYDROCHLORIDE 25 MG/ML
6.25 INJECTION, SOLUTION INTRAMUSCULAR; INTRAVENOUS ONCE AS NEEDED
Status: DISCONTINUED | OUTPATIENT
Start: 2019-12-19 | End: 2019-12-19 | Stop reason: HOSPADM

## 2019-12-19 RX ORDER — PROPOFOL 10 MG/ML
VIAL (ML) INTRAVENOUS AS NEEDED
Status: DISCONTINUED | OUTPATIENT
Start: 2019-12-19 | End: 2019-12-19 | Stop reason: SURG

## 2019-12-19 RX ORDER — MEPERIDINE HYDROCHLORIDE 25 MG/ML
12.5 INJECTION INTRAMUSCULAR; INTRAVENOUS; SUBCUTANEOUS
Status: DISCONTINUED | OUTPATIENT
Start: 2019-12-19 | End: 2019-12-19 | Stop reason: HOSPADM

## 2019-12-19 RX ORDER — FAMOTIDINE 10 MG/ML
20 INJECTION, SOLUTION INTRAVENOUS ONCE
Status: COMPLETED | OUTPATIENT
Start: 2019-12-19 | End: 2019-12-19

## 2019-12-19 RX ORDER — DEXAMETHASONE SODIUM PHOSPHATE 4 MG/ML
INJECTION, SOLUTION INTRA-ARTICULAR; INTRALESIONAL; INTRAMUSCULAR; INTRAVENOUS; SOFT TISSUE AS NEEDED
Status: DISCONTINUED | OUTPATIENT
Start: 2019-12-19 | End: 2019-12-19 | Stop reason: SURG

## 2019-12-19 RX ORDER — FLUMAZENIL 0.1 MG/ML
0.2 INJECTION INTRAVENOUS AS NEEDED
Status: DISCONTINUED | OUTPATIENT
Start: 2019-12-19 | End: 2019-12-19 | Stop reason: HOSPADM

## 2019-12-19 RX ORDER — SCOLOPAMINE TRANSDERMAL SYSTEM 1 MG/1
1 PATCH, EXTENDED RELEASE TRANSDERMAL
Status: DISCONTINUED | OUTPATIENT
Start: 2019-12-19 | End: 2019-12-19 | Stop reason: HOSPADM

## 2019-12-19 RX ORDER — SODIUM CHLORIDE 0.9 % (FLUSH) 0.9 %
10 SYRINGE (ML) INJECTION AS NEEDED
Status: DISCONTINUED | OUTPATIENT
Start: 2019-12-19 | End: 2019-12-19 | Stop reason: HOSPADM

## 2019-12-19 RX ORDER — SODIUM CHLORIDE, SODIUM LACTATE, POTASSIUM CHLORIDE, CALCIUM CHLORIDE 600; 310; 30; 20 MG/100ML; MG/100ML; MG/100ML; MG/100ML
9 INJECTION, SOLUTION INTRAVENOUS CONTINUOUS PRN
Status: DISCONTINUED | OUTPATIENT
Start: 2019-12-19 | End: 2019-12-19 | Stop reason: HOSPADM

## 2019-12-19 RX ORDER — ROCURONIUM BROMIDE 10 MG/ML
INJECTION, SOLUTION INTRAVENOUS AS NEEDED
Status: DISCONTINUED | OUTPATIENT
Start: 2019-12-19 | End: 2019-12-19 | Stop reason: SURG

## 2019-12-19 RX ORDER — LIDOCAINE HYDROCHLORIDE 20 MG/ML
INJECTION, SOLUTION INTRAVENOUS AS NEEDED
Status: DISCONTINUED | OUTPATIENT
Start: 2019-12-19 | End: 2019-12-19 | Stop reason: SURG

## 2019-12-19 RX ORDER — ONDANSETRON 2 MG/ML
4 INJECTION INTRAMUSCULAR; INTRAVENOUS ONCE AS NEEDED
Status: COMPLETED | OUTPATIENT
Start: 2019-12-19 | End: 2019-12-19

## 2019-12-19 RX ORDER — GLYCOPYRROLATE 1 MG/5 ML
SYRINGE (ML) INTRAVENOUS AS NEEDED
Status: DISCONTINUED | OUTPATIENT
Start: 2019-12-19 | End: 2019-12-19 | Stop reason: SURG

## 2019-12-19 RX ORDER — HYDRALAZINE HYDROCHLORIDE 20 MG/ML
5 INJECTION INTRAMUSCULAR; INTRAVENOUS
Status: DISCONTINUED | OUTPATIENT
Start: 2019-12-19 | End: 2019-12-19 | Stop reason: HOSPADM

## 2019-12-19 RX ORDER — LORAZEPAM 2 MG/ML
1 INJECTION INTRAMUSCULAR ONCE
Status: COMPLETED | OUTPATIENT
Start: 2019-12-19 | End: 2019-12-19

## 2019-12-19 RX ORDER — NEOSTIGMINE METHYLSULFATE 5 MG/5 ML
SYRINGE (ML) INTRAVENOUS AS NEEDED
Status: DISCONTINUED | OUTPATIENT
Start: 2019-12-19 | End: 2019-12-19 | Stop reason: SURG

## 2019-12-19 RX ADMIN — ONDANSETRON 4 MG: 2 INJECTION INTRAMUSCULAR; INTRAVENOUS at 08:51

## 2019-12-19 RX ADMIN — MEPERIDINE HYDROCHLORIDE 12.5 MG: 25 INJECTION INTRAMUSCULAR; INTRAVENOUS; SUBCUTANEOUS at 10:44

## 2019-12-19 RX ADMIN — PROPOFOL 200 MG: 10 INJECTION, EMULSION INTRAVENOUS at 09:57

## 2019-12-19 RX ADMIN — FENTANYL CITRATE 100 MCG: 50 INJECTION, SOLUTION INTRAMUSCULAR; INTRAVENOUS at 09:56

## 2019-12-19 RX ADMIN — Medication 3 MG: at 10:26

## 2019-12-19 RX ADMIN — ROCURONIUM BROMIDE 50 MG: 10 INJECTION, SOLUTION INTRAVENOUS at 09:57

## 2019-12-19 RX ADMIN — CEFAZOLIN SODIUM 2 G: 1 INJECTION, POWDER, FOR SOLUTION INTRAMUSCULAR; INTRAVENOUS at 10:05

## 2019-12-19 RX ADMIN — DEXAMETHASONE SODIUM PHOSPHATE 4 MG: 4 INJECTION, SOLUTION INTRAMUSCULAR; INTRAVENOUS at 09:57

## 2019-12-19 RX ADMIN — SCOPALAMINE 1 PATCH: 1 PATCH, EXTENDED RELEASE TRANSDERMAL at 08:50

## 2019-12-19 RX ADMIN — Medication 0.6 MG: at 10:26

## 2019-12-19 RX ADMIN — FAMOTIDINE 20 MG: 10 INJECTION, SOLUTION INTRAVENOUS at 08:51

## 2019-12-19 RX ADMIN — MIDAZOLAM 2 MG: 1 INJECTION INTRAMUSCULAR; INTRAVENOUS at 09:56

## 2019-12-19 RX ADMIN — LORAZEPAM 1 MG: 2 INJECTION INTRAMUSCULAR; INTRAVENOUS at 08:51

## 2019-12-19 RX ADMIN — LIDOCAINE HYDROCHLORIDE 40 MG: 20 INJECTION, SOLUTION INTRAVENOUS at 09:57

## 2019-12-19 RX ADMIN — ONDANSETRON 4 MG: 2 INJECTION INTRAMUSCULAR; INTRAVENOUS at 10:02

## 2019-12-19 RX ADMIN — SODIUM CHLORIDE, SODIUM LACTATE, POTASSIUM CHLORIDE, AND CALCIUM CHLORIDE 9 ML/HR: 600; 310; 30; 20 INJECTION, SOLUTION INTRAVENOUS at 08:51

## 2019-12-19 NOTE — OP NOTE
Operative Note:    Patient Name:  Jacqueline Coronel  YOB: 1980    Date of Surgery:  12/19/2019     Indications: Patient is a very pleasant 39-year-old female with a history of pelvic pain concerning for adhesive disease versus endometriosis.  She presents today for diagnostic laparoscopy    Pre-op Diagnosis:   Abdominal pain [R10.9]       Post-Op Diagnosis Codes:     * Abdominal pain [R10.9]    Procedure/CPT® Codes:      Procedure(s):  DIAGNOSTIC LAPAROSCOPY    Staff:  Surgeon(s):  Corine Donovan MD    Assistant: Jacqueline Siddiqui PA-C    Anesthesia: General    Estimated Blood Loss: none    Implants:    Nothing was implanted during the procedure    Specimen:          None        Findings: No evidence of endometriosis or adhesive disease mildly enlarged uterus    Complications: None    Description of Procedure: Patient was brought to the operating room and transferred to the operating table in the standard supine position.  The region of the abdomen was sterilely prepped and draped, a Jenkins catheter was placed, and a timeout was performed.  A varies needle was inserted into the left upper quadrant of the abdomen and carbon dioxide insufflation was begun a 5 mm Optiview was placed.  A second 5 mm Optiview was placed in the left lower quadrant.  The entire abdominal cavity was visualized there was no evidence of adhesive disease, endometriosis, or other pathology.  Uterus was incidentally noted to be what appeared to be mildly enlarged.  No other findings were noted.  Abdomen was desufflated and skin was closed with 4-0 Vicryl.    Patient tolerated the procedure well.    Sponge and instrument counts correct x2.      Corine Donovan MD     Date: 12/19/2019  Time: 10:30 AM

## 2019-12-19 NOTE — ANESTHESIA PROCEDURE NOTES
Airway  Urgency: elective    Date/Time: 12/19/2019 9:58 AM  Airway not difficult    General Information and Staff    Patient location during procedure: OR  Anesthesiologist: Chioma Cadena MD    Indications and Patient Condition  Indications for airway management: airway protection    Preoxygenated: yes  MILS not maintained throughout  Mask difficulty assessment: 1 - vent by mask    Final Airway Details  Final airway type: endotracheal airway      Successful airway: ETT  Cuffed: yes   Successful intubation technique: direct laryngoscopy  Endotracheal tube insertion site: oral  Blade: Adry  Blade size: 4  ETT size (mm): 7.0  Cormack-Lehane Classification: grade I - full view of glottis  Placement verified by: capnometry and palpation of cuff   Measured from: lips  Number of attempts at approach: 1  Assessment: lips, teeth, and gum same as pre-op and atraumatic intubation    Additional Comments  ASA monitors applied; preoxygenated with 100% FiO2 via anesthesia face mask; induction of general anesthesia; bag-mask ventilation; patient's position optimized; cuffed ETT placed; cuff inflated to seal; atraumatic/dentition in preoperative condition; ETT secured in place; correct placement confirmed by bilateral chest rise, tube condensation, and return of EtCO2 > 30 mmHg x3

## 2019-12-19 NOTE — ANESTHESIA POSTPROCEDURE EVALUATION
Patient: Jacqueline Coronel    Procedure Summary     Date:  12/19/19 Room / Location:  [facility] OR 06 / [facility] MAIN OR    Anesthesia Start:  0956 Anesthesia Stop:  1038    Procedure:  DIAGNOSTIC LAPAROSCOPY (N/A Abdomen) Diagnosis:       Abdominal pain      (Abdominal pain [R10.9])    Surgeon:  Corine Donovan MD Provider:  Chioma Cadena MD    Anesthesia Type:  general ASA Status:  2          Anesthesia Type: general    Vitals  No vitals data found for the desired time range.          Post Anesthesia Care and Evaluation    Patient location during evaluation: PACU  Patient participation: complete - patient participated  Level of consciousness: awake  Pain score: 0 (See nurse's notes for pain score)  Pain management: adequate  Airway patency: patent  Anesthetic complications: No anesthetic complications  PONV Status: none  Cardiovascular status: acceptable  Respiratory status: acceptable  Hydration status: acceptable    Comments: Patient seen and examined postoperatively; vital signs stable; SpO2 greater than or equal to 90%; cardiopulmonary status stable; nausea/vomiting adequately controlled; pain adequately controlled; no apparent anesthesia complications; patient discharged from anesthesia care when discharge criteria were met

## 2019-12-19 NOTE — ANESTHESIA PREPROCEDURE EVALUATION
Anesthesia Evaluation     Patient summary reviewed and Nursing notes reviewed   history of anesthetic complications: PONV  NPO Solid Status: > 8 hours  NPO Liquid Status: > 8 hours           Airway   Mallampati: I  TM distance: >3 FB  Neck ROM: full  No difficulty expected  Dental - normal exam     Pulmonary - negative pulmonary ROS and normal exam   Cardiovascular - negative cardio ROS and normal exam        Neuro/Psych  (+) psychiatric history Anxiety and Depression,     GI/Hepatic/Renal/Endo - negative ROS     Musculoskeletal     (+) chronic pain,   Abdominal  - normal exam    Bowel sounds: normal.   Substance History - negative use     OB/GYN negative ob/gyn ROS         Other   arthritis,                      Anesthesia Plan    ASA 2     general     intravenous induction     Anesthetic plan, all risks, benefits, and alternatives have been provided, discussed and informed consent has been obtained with: patient.

## 2020-01-07 ENCOUNTER — OFFICE VISIT (OUTPATIENT)
Dept: SURGERY | Facility: CLINIC | Age: 40
End: 2020-01-07

## 2020-01-07 VITALS
BODY MASS INDEX: 23.79 KG/M2 | TEMPERATURE: 97 F | HEART RATE: 64 BPM | SYSTOLIC BLOOD PRESSURE: 127 MMHG | WEIGHT: 151.6 LBS | HEIGHT: 67 IN | DIASTOLIC BLOOD PRESSURE: 90 MMHG | OXYGEN SATURATION: 99 %

## 2020-01-07 DIAGNOSIS — N94.6 DYSMENORRHEA: ICD-10-CM

## 2020-01-07 DIAGNOSIS — Z09 FOLLOW UP: Primary | ICD-10-CM

## 2020-01-07 PROCEDURE — 99024 POSTOP FOLLOW-UP VISIT: CPT | Performed by: PHYSICIAN ASSISTANT

## 2020-01-07 NOTE — PROGRESS NOTES
"Subjective   Jacqueline Coronel is a 40 y.o. female.     S/p diagnostic laparoscopy    Patient has no complaints related to surgery other than some discomfort from intra-abdominal air status post insufflation which has resolved.  She also had decreased urinary output for several days following surgery, however that has resolved and she is urinating normally at this time.  She continues to have significant pain around her periods and during intercourse.    Objective   /90   Pulse 64   Temp 97 °F (36.1 °C) (Oral)   Ht 170.2 cm (67\")   Wt 68.8 kg (151 lb 9.6 oz)   SpO2 99%   BMI 23.74 kg/m²     Physical Exam   Abdominal:   Left upper and lower quadrant incisions are healing well without erythema or drainage       Assessment/Plan   Jacqueline was seen today for post-op follow-up.    Diagnoses and all orders for this visit:    Follow up    Dysmenorrhea  -     Ambulatory Referral to Obstetrics / Gynecology      Discussed with patient that difficulty urinating was likely due to manipulation of bladder with Jenkins catheter placement.    We will refer patient to Dr. Ji, OB/GYN for further evaluation of dysmenorrhea in context of mildly enlarged uterus on diagnostic laparoscopy.  Patient would like to establish care with a new provider; she was previously under the care of OB/GYN physician in [place].    No pathology to review from this procedure.    RTC PRN.  Call with any concerns.    Discussed patient's 4-day decreased urinary output with Dr. Donovan to see if she would recommend any follow-up labs to evaluate kidneys.  Dr. Donovan does not recommend any follow-up labs.  I called the patient and informed her of our thought process and Dr. Donovan's recommendation.  Patient is satisfied with that plan and has no further questions.    Jacqueline Siddiqui PA-C, 1/7/2020 10:04 AM        "

## 2020-01-10 ENCOUNTER — INFUSION (OUTPATIENT)
Dept: ONCOLOGY | Facility: HOSPITAL | Age: 40
End: 2020-01-10

## 2020-01-10 ENCOUNTER — LAB (OUTPATIENT)
Dept: OTHER | Facility: HOSPITAL | Age: 40
End: 2020-01-10

## 2020-01-10 ENCOUNTER — OFFICE VISIT (OUTPATIENT)
Dept: ONCOLOGY | Facility: CLINIC | Age: 40
End: 2020-01-10

## 2020-01-10 VITALS
BODY MASS INDEX: 23.79 KG/M2 | HEIGHT: 67 IN | RESPIRATION RATE: 18 BRPM | OXYGEN SATURATION: 100 % | SYSTOLIC BLOOD PRESSURE: 114 MMHG | TEMPERATURE: 98.2 F | DIASTOLIC BLOOD PRESSURE: 75 MMHG | HEART RATE: 62 BPM | WEIGHT: 151.6 LBS

## 2020-01-10 DIAGNOSIS — E83.110 HEREDITARY HEMOCHROMATOSIS (HCC): ICD-10-CM

## 2020-01-10 DIAGNOSIS — E83.110 HEREDITARY HEMOCHROMATOSIS (HCC): Primary | ICD-10-CM

## 2020-01-10 LAB
ALBUMIN SERPL-MCNC: 4.5 G/DL (ref 3.5–5.2)
ALBUMIN/GLOB SERPL: 1.7 G/DL
ALP SERPL-CCNC: 57 U/L (ref 39–117)
ALT SERPL W P-5'-P-CCNC: 11 U/L (ref 1–33)
ANION GAP SERPL CALCULATED.3IONS-SCNC: 9.6 MMOL/L (ref 5–15)
AST SERPL-CCNC: 13 U/L (ref 1–32)
BASOPHILS # BLD AUTO: 0.03 10*3/MM3 (ref 0–0.2)
BASOPHILS NFR BLD AUTO: 0.6 % (ref 0–1.5)
BILIRUB SERPL-MCNC: 0.4 MG/DL (ref 0.1–1.2)
BUN BLD-MCNC: 12 MG/DL (ref 6–20)
BUN/CREAT SERPL: 16.2 (ref 7–25)
CALCIUM SPEC-SCNC: 9.2 MG/DL (ref 8.6–10.5)
CHLORIDE SERPL-SCNC: 102 MMOL/L (ref 98–107)
CO2 SERPL-SCNC: 27.4 MMOL/L (ref 22–29)
CREAT BLD-MCNC: 0.74 MG/DL (ref 0.57–1)
DEPRECATED RDW RBC AUTO: 43.3 FL (ref 37–54)
EOSINOPHIL # BLD AUTO: 0.14 10*3/MM3 (ref 0–0.4)
EOSINOPHIL NFR BLD AUTO: 2.8 % (ref 0.3–6.2)
ERYTHROCYTE [DISTWIDTH] IN BLOOD BY AUTOMATED COUNT: 12.7 % (ref 12.3–15.4)
FERRITIN SERPL-MCNC: 55.6 NG/ML (ref 13–150)
GFR SERPL CREATININE-BSD FRML MDRD: 87 ML/MIN/1.73
GLOBULIN UR ELPH-MCNC: 2.6 GM/DL
GLUCOSE BLD-MCNC: 163 MG/DL (ref 65–99)
HCT VFR BLD AUTO: 38.3 % (ref 34–46.6)
HGB BLD-MCNC: 12.6 G/DL (ref 12–15.9)
IMM GRANULOCYTES # BLD AUTO: 0.01 10*3/MM3 (ref 0–0.05)
IMM GRANULOCYTES NFR BLD AUTO: 0.2 % (ref 0–0.5)
LYMPHOCYTES # BLD AUTO: 1.39 10*3/MM3 (ref 0.7–3.1)
LYMPHOCYTES NFR BLD AUTO: 28.1 % (ref 19.6–45.3)
MCH RBC QN AUTO: 30.2 PG (ref 26.6–33)
MCHC RBC AUTO-ENTMCNC: 32.9 G/DL (ref 31.5–35.7)
MCV RBC AUTO: 91.8 FL (ref 79–97)
MONOCYTES # BLD AUTO: 0.36 10*3/MM3 (ref 0.1–0.9)
MONOCYTES NFR BLD AUTO: 7.3 % (ref 5–12)
NEUTROPHILS # BLD AUTO: 3.01 10*3/MM3 (ref 1.7–7)
NEUTROPHILS NFR BLD AUTO: 61 % (ref 42.7–76)
NRBC BLD AUTO-RTO: 0 /100 WBC (ref 0–0.2)
PLATELET # BLD AUTO: 187 10*3/MM3 (ref 140–450)
PMV BLD AUTO: 10.6 FL (ref 6–12)
POTASSIUM BLD-SCNC: 4.4 MMOL/L (ref 3.5–5.2)
PROT SERPL-MCNC: 7.1 G/DL (ref 6–8.5)
RBC # BLD AUTO: 4.17 10*6/MM3 (ref 3.77–5.28)
SODIUM BLD-SCNC: 139 MMOL/L (ref 136–145)
WBC NRBC COR # BLD: 4.94 10*3/MM3 (ref 3.4–10.8)

## 2020-01-10 PROCEDURE — 36415 COLL VENOUS BLD VENIPUNCTURE: CPT

## 2020-01-10 PROCEDURE — 80053 COMPREHEN METABOLIC PANEL: CPT | Performed by: INTERNAL MEDICINE

## 2020-01-10 PROCEDURE — 99214 OFFICE O/P EST MOD 30 MIN: CPT | Performed by: INTERNAL MEDICINE

## 2020-01-10 PROCEDURE — 82728 ASSAY OF FERRITIN: CPT | Performed by: INTERNAL MEDICINE

## 2020-01-10 PROCEDURE — 85025 COMPLETE CBC W/AUTO DIFF WBC: CPT | Performed by: INTERNAL MEDICINE

## 2020-01-10 PROCEDURE — 99195 PHLEBOTOMY: CPT | Performed by: INTERNAL MEDICINE

## 2020-01-10 RX ORDER — SODIUM CHLORIDE 9 MG/ML
250 INJECTION, SOLUTION INTRAVENOUS ONCE
Status: CANCELLED | OUTPATIENT
Start: 2020-01-10

## 2020-01-10 RX ORDER — SODIUM CHLORIDE 9 MG/ML
250 INJECTION, SOLUTION INTRAVENOUS ONCE
Status: CANCELLED | OUTPATIENT
Start: 2020-01-17

## 2020-01-10 RX ORDER — METOPROLOL SUCCINATE 25 MG/1
25 TABLET, EXTENDED RELEASE ORAL NIGHTLY
COMMUNITY
Start: 2020-01-10 | End: 2021-01-08 | Stop reason: ALTCHOICE

## 2020-01-10 NOTE — PROGRESS NOTES
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 year old female who is here for an opinion about the above issue.  And undergone a hemochromatosis gene mutation assay at [facility] in the past and was found to be homozygous for the C282Y mutation.  In spite of this her iron stores did not appear to be significantly abnormal and after just a couple of phlebotomies she became symptomatically iron deficient with a ferritin of 14 in 2019.  Her hemoglobin also declined to 11-1/2 g/dL.    She is here today for further follow-up and reassessment of her iron stores.    She does not drink alcohol.  She is not aware of any family history of insulin-dependent diabetes or cirrhosis of the liver except for her paternal grandfather who was also an alcoholic.    History of Present Illness     Past Medical History:   Diagnosis Date   • Anxiety    • Chronic cholecystitis    • Depression    • Hereditary hemochromatosis (CMS/HCC)    • History of chicken pox    • History of IBS    • History of migraines    • [place] (hard of hearing)     Due to ear history, surgery x4   • Hypertension 2017    Highest bp around 150/90   • Latex allergy    • Osteoarthritis    • Osteoporosis    • Tattoos         Past Surgical History:   Procedure Laterality Date   • ABDOMINAL WALL SURGERY  2017    Endometrioma removed from abdominal wall/ scar   •  SECTION  ,    • CHOLECYSTECTOMY     • DIAGNOSTIC LAPAROSCOPY N/A 2019    Procedure: DIAGNOSTIC LAPAROSCOPY;  Surgeon: Corine Donovan MD;  Location: [facility] MAIN OR;  Service: General   • INNER EAR SURGERY Bilateral ,     Tympanoplasty   • TUBAL ABDOMINAL LIGATION     • TYMPANOPLASTY Bilateral         Current Outpatient Medications on File Prior to Visit   Medication Sig Dispense Refill   •  butalbital-acetaminophen-caffeine (FIORICET) -40 MG capsule capsule Take 1 capsule by mouth Every 4 (Four) Hours As Needed. Last dose 19 for surgery     • fexofenadine (ALLEGRA) 60 MG tablet Take 60 mg by mouth As Needed.     • FLUoxetine (PROzac) 40 MG capsule Take 40 mg by mouth Daily.     • gabapentin (NEURONTIN) 100 MG capsule Take 200 mg by mouth Every Night.  2   • metoprolol succinate XL (TOPROL-XL) 25 MG 24 hr tablet      • traZODone (DESYREL) 50 MG tablet Take 50 mg by mouth Every Evening.  2   • ASPIRIN 81 PO Take 81 mg by mouth Daily.     • Multiple Vitamin (MULTIVITAMIN) tablet tablet Take 1 tablet by mouth Daily. Last dose  for surgery     • propranolol (INDERAL) 10 MG tablet Take 10 mg by mouth every night at bedtime.  3   • [DISCONTINUED] metoprolol tartrate (LOPRESSOR) 25 MG tablet Take 25 mg by mouth Daily.       No current facility-administered medications on file prior to visit.         ALLERGIES:    Allergies   Allergen Reactions   • Mefenamic Acid Swelling     Facial swelling   • Latex Rash     sensitive   • Sulfa Antibiotics Rash     Had at a young age vomiting and rash  rash & vomting          Social History     Socioeconomic History   • Marital status:      Spouse name: Storm   • Number of children: 2   • Years of education: College   • Highest education level: Not on file   Occupational History     Employer: [employer]   • Occupation: Surgery Coordinator     Employer: [employer]   Tobacco Use   • Smoking status: Former Smoker     Packs/day: 0.25     Years: 10.00     Pack years: 2.50     Types: Electronic Cigarette     Last attempt to quit: 2008     Years since quittin.0   • Smokeless tobacco: Never Used   Substance and Sexual Activity   • Alcohol use: No     Frequency: Never   • Drug use: No   • Sexual activity: Yes     Partners: Male     Birth control/protection: Surgical     Comment: Tubal ligation 2016 with uterine ablation        Family History  "  Problem Relation Age of Onset   • Hypertension Mother    • Hypertension Father    • Cancer Maternal Grandfather         Thyroid        Review of Systems   Constitutional: Positive for fatigue. Negative for activity change, chills and fever.   HENT: Positive for hearing loss. Negative for mouth sores, trouble swallowing and voice change.    Eyes: Negative for pain and visual disturbance.   Respiratory: Negative for cough, shortness of breath and wheezing.    Cardiovascular: Negative for chest pain and palpitations.   Gastrointestinal: Positive for constipation. Negative for abdominal pain, diarrhea, nausea and vomiting.   Genitourinary: Negative for difficulty urinating, frequency and urgency.   Musculoskeletal: Positive for arthralgias and joint swelling.   Skin: Negative for rash.   Neurological: Positive for headaches. Negative for dizziness, seizures and weakness.   Hematological: Negative for adenopathy. Does not bruise/bleed easily.   Psychiatric/Behavioral: Negative for behavioral problems and confusion. The patient is not nervous/anxious.         Objective     Vitals:    01/10/20 1109   BP: 114/75   Pulse: 62   Resp: 18   Temp: 98.2 °F (36.8 °C)   TempSrc: Oral   SpO2: 100%   Weight: 68.8 kg (151 lb 9.6 oz)   Height: 170.2 cm (67.01\")   PainSc:   3   PainLoc: Comment: ashlie     Current Status 1/10/2020   ECOG score 0       Physical Exam   Constitutional: She is oriented to person, place, and time. She appears well-developed and well-nourished. No distress.   HENT:   Head: Normocephalic.   Eyes: Pupils are equal, round, and reactive to light. Conjunctivae and EOM are normal. No scleral icterus.   Neck: Normal range of motion. Neck supple. No JVD present. No thyromegaly present.   Cardiovascular: Normal rate and regular rhythm. Exam reveals no gallop and no friction rub.   No murmur heard.  Pulmonary/Chest: Effort normal and breath sounds normal. She has no wheezes. She has no rales.   Abdominal: Soft. She " exhibits no distension and no mass. There is no tenderness.   Musculoskeletal: Normal range of motion. She exhibits no edema or deformity.   Lymphadenopathy:     She has no cervical adenopathy.   Neurological: She is alert and oriented to person, place, and time. She has normal reflexes. No cranial nerve deficit.   Skin: Skin is warm and dry. No rash noted. No erythema.   Psychiatric: She has a normal mood and affect. Her behavior is normal. Judgment normal.         RECENT LABS:  Hematology WBC   Date Value Ref Range Status   01/10/2020 4.94 3.40 - 10.80 10*3/mm3 Final   03/12/2019 5.88 4.5 - 11.0 10*3/uL Final     RBC   Date Value Ref Range Status   01/10/2020 4.17 3.77 - 5.28 10*6/mm3 Final   03/12/2019 3.99 (L) 4.0 - 5.2 10*6/uL Final     Hemoglobin   Date Value Ref Range Status   01/10/2020 12.6 12.0 - 15.9 g/dL Final   03/12/2019 11.9 (L) 12.0 - 16.0 g/dL Final     Hematocrit   Date Value Ref Range Status   01/10/2020 38.3 34.0 - 46.6 % Final   03/12/2019 36.3 36.0 - 46.0 % Final     Platelets   Date Value Ref Range Status   01/10/2020 187 140 - 450 10*3/mm3 Final   03/12/2019 191 140 - 440 10*3/uL Final        Lab Results   Component Value Date    IRON 108 07/26/2019    TIBC 229 (L) 07/26/2019    FERRITIN 55.60 01/10/2020         Assessment/Plan   1.  Patient is homozygous for the C282Y hemochromatosis mutation but does not have clinical evidence of significant iron overload.  We explained to the patient that not everyone who is homozygous for this mutation has clinical hemochromatosis although it is also possible that her monthly periods may be helping to keep her iron status and check.  It is possible that her iron storage may be more significantly impacted when she is postmenopausal.  2.  Iron deficiency anemia from prior phlebotomy.  Her anemia has resolved and her ferritin level is now greater than 50 therefore she will undergo phlebotomy in the office today.  3.  Chronic arthralgias.  I explained to the  patient that I do not think this is related to iron deposition since she does not have other signs of significant iron overload.  4.  Pelvic pain.  She underwent laparoscopic evaluation recently with no evidence of endometriosis or adhesions.  She is scheduled to follow-up with a gynecologist in [facility] for evaluation of some uterine enlargement.    Plan  1.   Jacqueline will undergo phlebotomy of 300 cc in the office today due to her ferritin greater than 50.  2.  She will be scheduled to return in 3 months for lab and possible phlebotomy if ferritin is 50 or greater.  3.  MD follow-up in 6 months with lab and possible phlebotomy.

## 2020-04-09 ENCOUNTER — TELEPHONE (OUTPATIENT)
Dept: ONCOLOGY | Facility: HOSPITAL | Age: 40
End: 2020-04-09

## 2020-04-09 ENCOUNTER — TELEPHONE (OUTPATIENT)
Dept: ONCOLOGY | Facility: CLINIC | Age: 40
End: 2020-04-09

## 2020-04-09 NOTE — TELEPHONE ENCOUNTER
----- Message from Ramon Trinh MD sent at 4/9/2020  1:18 PM EDT -----  Regarding: RE: phlebo  Please cancel the lab and phlebotomy appointment on 4/10/2020 but keep the scheduled appointment in June.    Thanks  ----- Message -----  From: Jenna Montoya RN  Sent: 4/9/2020  12:47 PM EDT  To: Ramon Trinh MD  Subject: phlebo                                           Jacqueline Irby is scheduled for phlebo tomorrow, would you like for her to keep her appt or would you like to delay her appt and if delayed, when would you like her to come in.  Her last phlebo was done on 1/10 and her ferritin level at that time was 55. Her next appt is 6/26 to see you and poss phlebo. Please advise, thank you

## 2020-04-09 NOTE — TELEPHONE ENCOUNTER
Spoke with pt on phone and informed that appt for tomorrow will be canceled per Dr Trinh and her next appt will be in June. Pt vu and instructed to call office with any concerns.     Ramon Trinh MD Cook, Mary E, RN   Cc: [facility]  Pool             Please cancel the lab and phlebotomy appointment on 4/10/2020 but keep the scheduled appointment in June.     Thanks    Previous Messages      ----- Message -----   From: Jenna Montoya RN   Sent: 4/9/2020  12:47 PM EDT   To: Ramon Trinh MD   Subject: phlebo                                           Jacqueline Irby is scheduled for phlebo tomorrow, would you like for her to keep her appt or would you like to delay her appt and if delayed, when would you like her to come in.  Her last phlebo was done on 1/10 and her ferritin level at that time was 55. Her next appt is 6/26 to see you and poss phlebo. Please advise, thank you         t

## 2020-04-10 ENCOUNTER — APPOINTMENT (OUTPATIENT)
Dept: ONCOLOGY | Facility: HOSPITAL | Age: 40
End: 2020-04-10

## 2020-04-10 ENCOUNTER — APPOINTMENT (OUTPATIENT)
Dept: OTHER | Facility: HOSPITAL | Age: 40
End: 2020-04-10

## 2020-06-17 ENCOUNTER — TRANSCRIBE ORDERS (OUTPATIENT)
Dept: ADMINISTRATIVE | Facility: HOSPITAL | Age: 40
End: 2020-06-17

## 2020-06-17 ENCOUNTER — HOSPITAL ENCOUNTER (OUTPATIENT)
Dept: MRI IMAGING | Facility: HOSPITAL | Age: 40
End: 2020-06-17

## 2020-06-17 DIAGNOSIS — M25.511 RIGHT SHOULDER PAIN, UNSPECIFIED CHRONICITY: Primary | ICD-10-CM

## 2020-06-17 DIAGNOSIS — M79.601 RIGHT ARM PAIN: Primary | ICD-10-CM

## 2020-06-17 DIAGNOSIS — M25.511 RIGHT SHOULDER PAIN, UNSPECIFIED CHRONICITY: ICD-10-CM

## 2020-06-17 DIAGNOSIS — M79.601 PAIN OF RIGHT UPPER EXTREMITY: ICD-10-CM

## 2020-06-20 ENCOUNTER — APPOINTMENT (OUTPATIENT)
Dept: MRI IMAGING | Facility: HOSPITAL | Age: 40
End: 2020-06-20

## 2020-06-25 ENCOUNTER — TELEPHONE (OUTPATIENT)
Dept: ONCOLOGY | Facility: CLINIC | Age: 40
End: 2020-06-25

## 2020-06-25 NOTE — TELEPHONE ENCOUNTER
PT NEEDS TO CANCEL HER LAB, DR ALLISON, AND INFUSION APPTS FOR TOMORROW.  SHE WOULD LIKE TO RESCHEDULE WITHIN THE NEXT WEEK OR TWO. SHE PREFERS FRIDAYS BETWEEN 10:00 - 11:00.    PLEASE GIVE PT A CALL [phone]

## 2020-06-26 ENCOUNTER — APPOINTMENT (OUTPATIENT)
Dept: ONCOLOGY | Facility: HOSPITAL | Age: 40
End: 2020-06-26

## 2020-06-26 ENCOUNTER — APPOINTMENT (OUTPATIENT)
Dept: OTHER | Facility: HOSPITAL | Age: 40
End: 2020-06-26

## 2020-07-10 ENCOUNTER — LAB (OUTPATIENT)
Dept: OTHER | Facility: HOSPITAL | Age: 40
End: 2020-07-10

## 2020-07-10 ENCOUNTER — OFFICE VISIT (OUTPATIENT)
Dept: ONCOLOGY | Facility: CLINIC | Age: 40
End: 2020-07-10

## 2020-07-10 ENCOUNTER — INFUSION (OUTPATIENT)
Dept: ONCOLOGY | Facility: HOSPITAL | Age: 40
End: 2020-07-10

## 2020-07-10 VITALS
HEART RATE: 85 BPM | WEIGHT: 153.8 LBS | DIASTOLIC BLOOD PRESSURE: 77 MMHG | BODY MASS INDEX: 24.14 KG/M2 | HEIGHT: 67 IN | TEMPERATURE: 97.5 F | OXYGEN SATURATION: 96 % | SYSTOLIC BLOOD PRESSURE: 119 MMHG | RESPIRATION RATE: 16 BRPM

## 2020-07-10 DIAGNOSIS — E83.110 HEREDITARY HEMOCHROMATOSIS (HCC): Primary | ICD-10-CM

## 2020-07-10 DIAGNOSIS — E83.110 HEREDITARY HEMOCHROMATOSIS (HCC): ICD-10-CM

## 2020-07-10 LAB
BASOPHILS # BLD AUTO: 0.03 10*3/MM3 (ref 0–0.2)
BASOPHILS NFR BLD AUTO: 0.5 % (ref 0–1.5)
DEPRECATED RDW RBC AUTO: 41.3 FL (ref 37–54)
EOSINOPHIL # BLD AUTO: 0.2 10*3/MM3 (ref 0–0.4)
EOSINOPHIL NFR BLD AUTO: 3.6 % (ref 0.3–6.2)
ERYTHROCYTE [DISTWIDTH] IN BLOOD BY AUTOMATED COUNT: 12.5 % (ref 12.3–15.4)
FERRITIN SERPL-MCNC: 61.1 NG/ML (ref 13–150)
HCT VFR BLD AUTO: 38.2 % (ref 34–46.6)
HGB BLD-MCNC: 12.8 G/DL (ref 12–15.9)
IMM GRANULOCYTES # BLD AUTO: 0.01 10*3/MM3 (ref 0–0.05)
IMM GRANULOCYTES NFR BLD AUTO: 0.2 % (ref 0–0.5)
IRON 24H UR-MRATE: 133 MCG/DL (ref 37–145)
IRON SATN MFR SERPL: 52 % (ref 20–50)
LYMPHOCYTES # BLD AUTO: 1.76 10*3/MM3 (ref 0.7–3.1)
LYMPHOCYTES NFR BLD AUTO: 31.3 % (ref 19.6–45.3)
MCH RBC QN AUTO: 30.5 PG (ref 26.6–33)
MCHC RBC AUTO-ENTMCNC: 33.5 G/DL (ref 31.5–35.7)
MCV RBC AUTO: 91 FL (ref 79–97)
MONOCYTES # BLD AUTO: 0.3 10*3/MM3 (ref 0.1–0.9)
MONOCYTES NFR BLD AUTO: 5.3 % (ref 5–12)
NEUTROPHILS NFR BLD AUTO: 3.32 10*3/MM3 (ref 1.7–7)
NEUTROPHILS NFR BLD AUTO: 59.1 % (ref 42.7–76)
NRBC BLD AUTO-RTO: 0 /100 WBC (ref 0–0.2)
PLATELET # BLD AUTO: 202 10*3/MM3 (ref 140–450)
PMV BLD AUTO: 10.5 FL (ref 6–12)
RBC # BLD AUTO: 4.2 10*6/MM3 (ref 3.77–5.28)
TIBC SERPL-MCNC: 255 MCG/DL (ref 298–536)
TRANSFERRIN SERPL-MCNC: 171 MG/DL (ref 200–360)
WBC # BLD AUTO: 5.62 10*3/MM3 (ref 3.4–10.8)

## 2020-07-10 PROCEDURE — 99195 PHLEBOTOMY: CPT

## 2020-07-10 PROCEDURE — 36415 COLL VENOUS BLD VENIPUNCTURE: CPT

## 2020-07-10 PROCEDURE — 82728 ASSAY OF FERRITIN: CPT | Performed by: INTERNAL MEDICINE

## 2020-07-10 PROCEDURE — 85025 COMPLETE CBC W/AUTO DIFF WBC: CPT | Performed by: INTERNAL MEDICINE

## 2020-07-10 PROCEDURE — 84466 ASSAY OF TRANSFERRIN: CPT | Performed by: INTERNAL MEDICINE

## 2020-07-10 PROCEDURE — 83540 ASSAY OF IRON: CPT | Performed by: INTERNAL MEDICINE

## 2020-07-10 PROCEDURE — 99213 OFFICE O/P EST LOW 20 MIN: CPT | Performed by: INTERNAL MEDICINE

## 2020-07-10 RX ORDER — SODIUM CHLORIDE 9 MG/ML
250 INJECTION, SOLUTION INTRAVENOUS ONCE
Status: CANCELLED | OUTPATIENT
Start: 2020-07-17

## 2020-07-10 RX ORDER — LIDOCAINE 50 MG/G
1 PATCH TOPICAL EVERY 24 HOURS
COMMUNITY
Start: 2020-06-30 | End: 2022-08-02

## 2020-07-10 RX ORDER — QUETIAPINE FUMARATE 25 MG/1
25 TABLET, FILM COATED ORAL
COMMUNITY
Start: 2020-05-20 | End: 2021-01-08

## 2020-07-10 RX ORDER — FLUTICASONE PROPIONATE 50 MCG
1 SPRAY, SUSPENSION (ML) NASAL AS NEEDED
COMMUNITY
Start: 2020-06-14 | End: 2022-09-19

## 2020-07-10 RX ORDER — BACLOFEN 10 MG/1
TABLET ORAL
COMMUNITY
Start: 2020-07-09 | End: 2020-07-21

## 2020-07-10 RX ORDER — ALPRAZOLAM 0.25 MG/1
0.25 TABLET ORAL NIGHTLY PRN
COMMUNITY
Start: 2020-06-05 | End: 2022-07-19

## 2020-07-10 NOTE — PROGRESS NOTES
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 year old female who is here for an opinion about the above issue.  And undergone a hemochromatosis gene mutation assay at [facility] in the past and was found to be homozygous for the C282Y mutation.  In spite of this her iron stores did not appear to be significantly abnormal and after just a couple of phlebotomies she became symptomatically iron deficient with a ferritin of 14 in 2019.  Her hemoglobin also declined to 11-1/2 g/dL.    She is here today for further follow-up and reassessment of her iron stores.  She had undergone a phlebotomy in January when her ferritin was 55.6.  We had intended for her to return in 3 months but the April appointment was canceled due to the COVID-19 crisis.  She returns now for 6-month follow-up and possible phlebotomy.  Her ferritin today is 61 and she will undergo phlebotomy of 300 cc.    She is uncomfortable today due to cervical radiculopathy.    History of Present Illness     Past Medical History:   Diagnosis Date   • Anxiety    • Chronic cholecystitis    • Depression    • Hereditary hemochromatosis (CMS/HCC)    • History of chicken pox    • History of IBS    • History of migraines    • [place] (hard of hearing)     Due to ear history, surgery x4   • Hypertension 2017    Highest bp around 150/90   • Latex allergy    • Osteoarthritis    • Osteoporosis    • Tattoos         Past Surgical History:   Procedure Laterality Date   • ABDOMINAL WALL SURGERY  2017    Endometrioma removed from abdominal wall/ scar   •  SECTION  ,    • CHOLECYSTECTOMY     • DIAGNOSTIC LAPAROSCOPY N/A 2019    Procedure: DIAGNOSTIC LAPAROSCOPY;  Surgeon: Corine Donovan MD;  Location: [facility] MAIN OR;  Service: General   • INNER EAR SURGERY Bilateral ,      Tympanoplasty   • TUBAL ABDOMINAL LIGATION     • TYMPANOPLASTY Bilateral         Current Outpatient Medications on File Prior to Visit   Medication Sig Dispense Refill   • ALPRAZolam (XANAX) 0.25 MG tablet Take 0.25 mg by mouth Daily As Needed.     • baclofen (LIORESAL) 10 MG tablet      • butalbital-acetaminophen-caffeine (FIORICET) -40 MG capsule capsule Take 1 capsule by mouth Every 4 (Four) Hours As Needed. Last dose 12/13/19 for surgery     • fexofenadine (ALLEGRA) 60 MG tablet Take 60 mg by mouth As Needed.     • FLUoxetine (PROzac) 40 MG capsule Take 40 mg by mouth Daily.     • fluticasone (FLONASE) 50 MCG/ACT nasal spray USE 2 SPRAYS BOTH NOSTRILS DAILY     • gabapentin (NEURONTIN) 100 MG capsule Take 200 mg by mouth Every Night.  2   • lidocaine (LIDODERM) 5 % APPLY 1 PATCH TOPICALLY TO THE AFFECTED AREA 2 TIMES DAILY AS NEEDED, REMOVE AFTER 12 HOURS     • metoprolol succinate XL (TOPROL-XL) 25 MG 24 hr tablet      • QUEtiapine (SEROquel) 25 MG tablet Take 25 mg by mouth every night at bedtime.     • ASPIRIN 81 PO Take 81 mg by mouth Daily.     • Elagolix Sodium 200 MG tablet Take 200 mg by mouth.     • Multiple Vitamin (MULTIVITAMIN) tablet tablet Take 1 tablet by mouth Daily. Last dose 12/13 for surgery     • propranolol (INDERAL) 10 MG tablet Take 10 mg by mouth every night at bedtime.  3   • traZODone (DESYREL) 50 MG tablet Take 50 mg by mouth Every Evening.  2     No current facility-administered medications on file prior to visit.         ALLERGIES:    Allergies   Allergen Reactions   • Mefenamic Acid Swelling     Facial swelling   • Latex Rash     sensitive   • Sulfa Antibiotics Rash     Had at a young age vomiting and rash  rash & vomting          Social History     Socioeconomic History   • Marital status:      Spouse name: Storm   • Number of children: 2   • Years of education: College   • Highest education level: Not on file   Occupational History     Employer: [employer]   •  "Occupation: Surgery Coordinator     Employer: [employer]   Tobacco Use   • Smoking status: Former Smoker     Packs/day: 0.25     Years: 10.00     Pack years: 2.50     Types: Electronic Cigarette     Last attempt to quit:      Years since quittin.5   • Smokeless tobacco: Never Used   Substance and Sexual Activity   • Alcohol use: No     Frequency: Never   • Drug use: No   • Sexual activity: Yes     Partners: Male     Birth control/protection: Surgical     Comment: Tubal ligation 2016 with uterine ablation        Family History   Problem Relation Age of Onset   • Hypertension Mother    • Hypertension Father    • Cancer Maternal Grandfather         Thyroid        Review of Systems   Constitutional: Positive for fatigue. Negative for activity change, chills and fever.   HENT: Positive for hearing loss. Negative for mouth sores, trouble swallowing and voice change.    Eyes: Negative for pain and visual disturbance.   Respiratory: Negative for cough, shortness of breath and wheezing.    Cardiovascular: Negative for chest pain and palpitations.   Gastrointestinal: Positive for constipation. Negative for abdominal pain, diarrhea, nausea and vomiting.   Genitourinary: Negative for difficulty urinating, frequency and urgency.   Musculoskeletal: Positive for arthralgias and joint swelling.   Skin: Negative for rash.   Neurological: Positive for headaches. Negative for dizziness, seizures and weakness.   Hematological: Negative for adenopathy. Does not bruise/bleed easily.   Psychiatric/Behavioral: Negative for behavioral problems and confusion. The patient is not nervous/anxious.         Objective     Vitals:    07/10/20 1401   BP: 119/77   Pulse: 85   Resp: 16   Temp: 97.5 °F (36.4 °C)   TempSrc: Skin   SpO2: 96%   Weight: 69.8 kg (153 lb 12.8 oz)   Height: 170.2 cm (67.01\")   PainSc: 5  Comment: CERVICAL DISKS     Current Status 7/10/2020   ECOG score 0       Physical Exam   Constitutional: She is oriented " to person, place, and time. She appears well-developed and well-nourished. No distress.   HENT:   Head: Normocephalic.   Eyes: Pupils are equal, round, and reactive to light. Conjunctivae and EOM are normal. No scleral icterus.   Neck: Normal range of motion. Neck supple. No JVD present. No thyromegaly present.   Cardiovascular: Normal rate and regular rhythm. Exam reveals no gallop and no friction rub.   No murmur heard.  Pulmonary/Chest: Effort normal and breath sounds normal. She has no wheezes. She has no rales.   Abdominal: Soft. She exhibits no distension and no mass. There is no tenderness.   Musculoskeletal: Normal range of motion. She exhibits no edema or deformity.   Lymphadenopathy:     She has no cervical adenopathy.   Neurological: She is alert and oriented to person, place, and time. She has normal reflexes. No cranial nerve deficit.   Skin: Skin is warm and dry. No rash noted. No erythema.   Psychiatric: She has a normal mood and affect. Her behavior is normal. Judgment normal.         RECENT LABS:  Hematology WBC   Date Value Ref Range Status   07/10/2020 5.62 3.40 - 10.80 10*3/mm3 Final   03/12/2019 5.88 4.5 - 11.0 10*3/uL Final     RBC   Date Value Ref Range Status   07/10/2020 4.20 3.77 - 5.28 10*6/mm3 Final   03/12/2019 3.99 (L) 4.0 - 5.2 10*6/uL Final     Hemoglobin   Date Value Ref Range Status   07/10/2020 12.8 12.0 - 15.9 g/dL Final   03/12/2019 11.9 (L) 12.0 - 16.0 g/dL Final     Hematocrit   Date Value Ref Range Status   07/10/2020 38.2 34.0 - 46.6 % Final   03/12/2019 36.3 36.0 - 46.0 % Final     Platelets   Date Value Ref Range Status   07/10/2020 202 140 - 450 10*3/mm3 Final   03/12/2019 191 140 - 440 10*3/uL Final        Lab Results   Component Value Date    IRON 133 07/10/2020    TIBC 255 (L) 07/10/2020    FERRITIN 61.10 07/10/2020         Assessment/Plan   1.  Patient is homozygous for the C282Y hemochromatosis mutation but does not have clinical evidence of significant iron overload.   We explained to the patient that not everyone who is homozygous for this mutation has clinical hemochromatosis although it is also possible that her monthly periods may be helping to keep her iron status and check.  It is possible that her iron storage may be more significantly impacted when she is postmenopausal.  2.  Iron deficiency anemia from prior phlebotomy.  Her anemia has resolved and her ferritin level is now greater than 50 therefore she will undergo phlebotomy in the office today.  3.  Chronic arthralgias.  I explained to the patient that I do not think this is related to iron deposition since she does not have other signs of significant iron overload.  4.  Pelvic pain.  She underwent laparoscopic evaluation recently with no evidence of endometriosis or adhesions.  She is scheduled to follow-up with a gynecologist in [facility] for evaluation of some uterine enlargement.  5.  Cervical radiculopathy affecting the right arm and hand.    Plan  1.   Jacqueline will undergo phlebotomy of 300 cc in the office today due to her ferritin greater than 50.  2.  She will be scheduled to return every 3 months for lab and possible phlebotomy if ferritin is 50 or greater.  3.  MD follow-up in 12 months with lab and possible phlebotomy.

## 2020-07-17 NOTE — PROGRESS NOTES
Subjective   History of Present Illness: Jacqueline Coronel is a 40 y.o. female is being seen for consultation today at the request of Steff Keys MD for neck pain and right arm pain.  Patient denies weakness, N/T, urinary incontinence or problems with her balance and gait. Patient had cervical MRI at [facility] 7.16.20.  Patient states that her symptoms started 7.15.20, she has not had PT or pain management, but has had chiropractic treatment    Patient is seeing Dr Trinh for hereditary hemochromatosis    This very nice lady was seen at the request of her friend, Richard Aguilar, and anesthesiologist I work with at [facility] [place].  She is very healthy, although she does have hemochromatosis.  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  About 4 to 5 weeks ago, she began having neck pain and radiating shoulder and arm pain on the right.  It progressed to numbness and tingling and some weakness.  It has been a pretty miserable few weeks.  She did have some steroids, which helped a little bit.  Chiropractic has not helped.  Pain medications help a bit.  She has had an MRI which shows a fairly substantial right C6-C7 herniated disc.  She has a markedly positive Spurling sign and she holds her head cocked to the left so as not to aggravate her pain.  She has motor deficits documented below.  Physical therapy, blocks, and medicines will really not help at this point and she needs to have an ACDF at the C6-C7.  I discussed this in detail.  I have given her some additional pain medications and will try and get this done as soon as possible.  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  I told her that it probably will take a good 8 to 12 weeks to fully recover from this surgery.      Neck Pain    The  "current episode started in the past 7 days. The problem occurs constantly. The pain is present in the midline. The pain is at a severity of 7/10. The pain is moderate. Associated symptoms include a fever. Pertinent negatives include no numbness or weakness.   Arm Pain    The pain is present in the upper right arm and right forearm. Pertinent negatives include no numbness.       The following portions of the patient's history were reviewed and updated as appropriate: allergies, current medications, past family history, past medical history, past social history, past surgical history and problem list.    Review of Systems   Constitutional: Positive for fever.   HENT: Negative.    Eyes: Negative.    Respiratory: Negative.    Cardiovascular: Negative.    Gastrointestinal: Negative.    Endocrine: Negative.    Musculoskeletal: Positive for neck pain and neck stiffness. Negative for arthralgias, gait problem, joint swelling and myalgias.   Allergic/Immunologic: Negative.    Neurological: Negative for weakness and numbness.   Hematological: Negative.    Psychiatric/Behavioral: Negative.        Objective     Vitals:    07/20/20 1334   BP: 132/72   Pulse: 74   Temp: 97.9 °F (36.6 °C)   Weight: 70.3 kg (155 lb)   Height: 170.2 cm (67.01\")     Body mass index is 24.27 kg/m².      Physical Exam   Constitutional: She appears well-developed and well-nourished.   HENT:   Head: Normocephalic and atraumatic.   Eyes: Pupils are equal, round, and reactive to light. Conjunctivae are normal.   Fundoscopic exam:       The right eye shows no papilledema. The right eye shows venous pulsations.        The left eye shows no papilledema. The left eye shows venous pulsations.   Neck: Carotid bruit is not present.     Neurologic Exam     Cranial Nerves     CN III, IV, VI   Pupils are equal, round, and reactive to light.    Motor Exam     Strength   Right triceps: 4/5  Right wrist flexion: 4/5  Right wrist extension: 4/5  Right interossei: " 4/5          Assessment/Plan   Independent Review of Radiographic Studies:      I personally reviewed the images from the following studies.    I reviewed the cervical MRI which does show a fairly substantial right C6-7 herniated cervical disc with root compression.  Agree with the report.        Medical Decision Making:      I described and recommended an anterior cervical discectomy and fusion with a cage and plate at C6-C7. The goal of surgery is relief of radiating arm pain and improvement of numbness, tingling, and weakness. This will help reduce but may not eliminate midline neck pain. The risks include, but are not limited to, infection, hemorrhage requiring transfusion or reoperation, CSF leak requiring reoperation, incomplete relief of symptoms, difficulty swallowing, hoarseness of voice, hardware problems requiring revision surgery, potential need for additional surgery in the future, stroke, paralysis, coma, and death. The patient agrees to proceed.  I have given her some pain medications to tide her over prior to surgery.    Jacqueline was seen today for neck pain and arm pain.    Diagnoses and all orders for this visit:    Cervical disc disorder at C6-C7 level with radiculopathy  -     HYDROcodone-acetaminophen (NORCO) 7.5-325 MG per tablet; Take 1 tablet by mouth Every 4 (Four) Hours As Needed for Moderate Pain .    Right arm weakness  -     HYDROcodone-acetaminophen (NORCO) 7.5-325 MG per tablet; Take 1 tablet by mouth Every 4 (Four) Hours As Needed for Moderate Pain .      Return for 2 weeks after surgery with a nurse practitioner.

## 2020-07-20 ENCOUNTER — OFFICE VISIT (OUTPATIENT)
Dept: NEUROSURGERY | Facility: CLINIC | Age: 40
End: 2020-07-20

## 2020-07-20 ENCOUNTER — HOSPITAL ENCOUNTER (OUTPATIENT)
Facility: HOSPITAL | Age: 40
Setting detail: HOSPITAL OUTPATIENT SURGERY
End: 2020-07-20
Attending: NEUROLOGICAL SURGERY | Admitting: NEUROLOGICAL SURGERY

## 2020-07-20 ENCOUNTER — PREP FOR SURGERY (OUTPATIENT)
Dept: OTHER | Facility: HOSPITAL | Age: 40
End: 2020-07-20

## 2020-07-20 VITALS
WEIGHT: 155 LBS | HEIGHT: 67 IN | SYSTOLIC BLOOD PRESSURE: 132 MMHG | BODY MASS INDEX: 24.33 KG/M2 | DIASTOLIC BLOOD PRESSURE: 72 MMHG | TEMPERATURE: 97.9 F | HEART RATE: 74 BPM

## 2020-07-20 DIAGNOSIS — Z01.818 PRE-OP TESTING: ICD-10-CM

## 2020-07-20 DIAGNOSIS — M50.123 CERVICAL DISC DISORDER AT C6-C7 LEVEL WITH RADICULOPATHY: Primary | ICD-10-CM

## 2020-07-20 DIAGNOSIS — R29.898 RIGHT ARM WEAKNESS: ICD-10-CM

## 2020-07-20 PROCEDURE — 99204 OFFICE O/P NEW MOD 45 MIN: CPT | Performed by: NEUROLOGICAL SURGERY

## 2020-07-20 RX ORDER — HYDROCODONE BITARTRATE AND ACETAMINOPHEN 7.5; 325 MG/1; MG/1
1 TABLET ORAL EVERY 4 HOURS PRN
Qty: 30 TABLET | Refills: 0 | Status: SHIPPED | OUTPATIENT
Start: 2020-07-20 | End: 2020-07-24 | Stop reason: HOSPADM

## 2020-07-21 ENCOUNTER — APPOINTMENT (OUTPATIENT)
Dept: PREADMISSION TESTING | Facility: HOSPITAL | Age: 40
End: 2020-07-21

## 2020-07-21 ENCOUNTER — TELEPHONE (OUTPATIENT)
Dept: NEUROSURGERY | Facility: CLINIC | Age: 40
End: 2020-07-21

## 2020-07-21 VITALS
DIASTOLIC BLOOD PRESSURE: 83 MMHG | RESPIRATION RATE: 16 BRPM | WEIGHT: 152.5 LBS | SYSTOLIC BLOOD PRESSURE: 117 MMHG | TEMPERATURE: 98.1 F | HEIGHT: 67 IN | HEART RATE: 77 BPM | BODY MASS INDEX: 23.93 KG/M2 | OXYGEN SATURATION: 96 %

## 2020-07-21 DIAGNOSIS — Z01.818 PRE-OP TESTING: ICD-10-CM

## 2020-07-21 LAB
ABO GROUP BLD: NORMAL
ANION GAP SERPL CALCULATED.3IONS-SCNC: 8 MMOL/L (ref 5–15)
APTT PPP: 28.5 SECONDS (ref 22.7–35.4)
BASOPHILS # BLD AUTO: 0.04 10*3/MM3 (ref 0–0.2)
BASOPHILS NFR BLD AUTO: 0.6 % (ref 0–1.5)
BILIRUB UR QL STRIP: NEGATIVE
BLD GP AB SCN SERPL QL: NEGATIVE
BUN SERPL-MCNC: 12 MG/DL (ref 6–20)
BUN/CREAT SERPL: 17.1 (ref 7–25)
CALCIUM SPEC-SCNC: 9.7 MG/DL (ref 8.6–10.5)
CHLORIDE SERPL-SCNC: 100 MMOL/L (ref 98–107)
CLARITY UR: CLEAR
CO2 SERPL-SCNC: 27 MMOL/L (ref 22–29)
COLOR UR: YELLOW
CREAT SERPL-MCNC: 0.7 MG/DL (ref 0.57–1)
DEPRECATED RDW RBC AUTO: 42 FL (ref 37–54)
EOSINOPHIL # BLD AUTO: 0.24 10*3/MM3 (ref 0–0.4)
EOSINOPHIL NFR BLD AUTO: 3.5 % (ref 0.3–6.2)
ERYTHROCYTE [DISTWIDTH] IN BLOOD BY AUTOMATED COUNT: 12.8 % (ref 12.3–15.4)
GFR SERPL CREATININE-BSD FRML MDRD: 93 ML/MIN/1.73
GLUCOSE SERPL-MCNC: 96 MG/DL (ref 65–99)
GLUCOSE UR STRIP-MCNC: NEGATIVE MG/DL
HCG SERPL QL: NEGATIVE
HCT VFR BLD AUTO: 38.8 % (ref 34–46.6)
HGB BLD-MCNC: 13 G/DL (ref 12–15.9)
HGB UR QL STRIP.AUTO: NEGATIVE
IMM GRANULOCYTES # BLD AUTO: 0.02 10*3/MM3 (ref 0–0.05)
IMM GRANULOCYTES NFR BLD AUTO: 0.3 % (ref 0–0.5)
INR PPP: 1.08 (ref 0.9–1.1)
KETONES UR QL STRIP: NEGATIVE
LEUKOCYTE ESTERASE UR QL STRIP.AUTO: NEGATIVE
LYMPHOCYTES # BLD AUTO: 1.92 10*3/MM3 (ref 0.7–3.1)
LYMPHOCYTES NFR BLD AUTO: 28 % (ref 19.6–45.3)
MCH RBC QN AUTO: 29.8 PG (ref 26.6–33)
MCHC RBC AUTO-ENTMCNC: 33.5 G/DL (ref 31.5–35.7)
MCV RBC AUTO: 89 FL (ref 79–97)
MONOCYTES # BLD AUTO: 0.52 10*3/MM3 (ref 0.1–0.9)
MONOCYTES NFR BLD AUTO: 7.6 % (ref 5–12)
NEUTROPHILS NFR BLD AUTO: 4.11 10*3/MM3 (ref 1.7–7)
NEUTROPHILS NFR BLD AUTO: 60 % (ref 42.7–76)
NITRITE UR QL STRIP: NEGATIVE
NRBC BLD AUTO-RTO: 0 /100 WBC (ref 0–0.2)
PH UR STRIP.AUTO: 6 [PH] (ref 5–8)
PLATELET # BLD AUTO: 243 10*3/MM3 (ref 140–450)
PMV BLD AUTO: 10.4 FL (ref 6–12)
POTASSIUM SERPL-SCNC: 3.9 MMOL/L (ref 3.5–5.2)
PROT UR QL STRIP: NEGATIVE
PROTHROMBIN TIME: 13.9 SECONDS (ref 11.7–14.2)
RBC # BLD AUTO: 4.36 10*6/MM3 (ref 3.77–5.28)
RH BLD: POSITIVE
SODIUM SERPL-SCNC: 135 MMOL/L (ref 136–145)
SP GR UR STRIP: 1.02 (ref 1–1.03)
T&S EXPIRATION DATE: NORMAL
UROBILINOGEN UR QL STRIP: NORMAL
WBC # BLD AUTO: 6.85 10*3/MM3 (ref 3.4–10.8)

## 2020-07-21 PROCEDURE — 86850 RBC ANTIBODY SCREEN: CPT | Performed by: NEUROLOGICAL SURGERY

## 2020-07-21 PROCEDURE — 86900 BLOOD TYPING SEROLOGIC ABO: CPT | Performed by: NEUROLOGICAL SURGERY

## 2020-07-21 PROCEDURE — C9803 HOPD COVID-19 SPEC COLLECT: HCPCS

## 2020-07-21 PROCEDURE — 86901 BLOOD TYPING SEROLOGIC RH(D): CPT | Performed by: NEUROLOGICAL SURGERY

## 2020-07-21 PROCEDURE — 81003 URINALYSIS AUTO W/O SCOPE: CPT | Performed by: NEUROLOGICAL SURGERY

## 2020-07-21 PROCEDURE — 85610 PROTHROMBIN TIME: CPT | Performed by: NEUROLOGICAL SURGERY

## 2020-07-21 PROCEDURE — 36415 COLL VENOUS BLD VENIPUNCTURE: CPT

## 2020-07-21 PROCEDURE — 80048 BASIC METABOLIC PNL TOTAL CA: CPT | Performed by: NEUROLOGICAL SURGERY

## 2020-07-21 PROCEDURE — 85025 COMPLETE CBC W/AUTO DIFF WBC: CPT | Performed by: NEUROLOGICAL SURGERY

## 2020-07-21 PROCEDURE — U0004 COV-19 TEST NON-CDC HGH THRU: HCPCS | Performed by: NURSE PRACTITIONER

## 2020-07-21 PROCEDURE — 84703 CHORIONIC GONADOTROPIN ASSAY: CPT | Performed by: NEUROLOGICAL SURGERY

## 2020-07-21 PROCEDURE — 85730 THROMBOPLASTIN TIME PARTIAL: CPT | Performed by: NEUROLOGICAL SURGERY

## 2020-07-21 PROCEDURE — 93005 ELECTROCARDIOGRAM TRACING: CPT

## 2020-07-21 PROCEDURE — 93010 ELECTROCARDIOGRAM REPORT: CPT | Performed by: INTERNAL MEDICINE

## 2020-07-21 RX ORDER — CYCLOBENZAPRINE HCL 10 MG
10 TABLET ORAL 2 TIMES DAILY PRN
Status: ON HOLD | COMMUNITY
End: 2020-07-24 | Stop reason: SDUPTHER

## 2020-07-21 NOTE — DISCHARGE INSTRUCTIONS
Take the following medications the morning of surgery:      METOPROLOL XL (TOPROL XL)  FLUOXETINE (PROZAC)  GABAPENTIN (NEURONTIN)  HYDROCODONE (NORCO) IF NEEDED      General Instructions:  • Do not eat solid food after midnight the night before surgery.  • You may drink clear liquids day of surgery but must stop at least one hour before your hospital arrival time @ 0500 AM STOP DRINKING!!  • It is beneficial for you to have a clear drink that contains carbohydrates the day of surgery.  We suggest a 12 to 20 ounce bottle of Gatorade or Powerade for non-diabetic patients or a 12 to 20 ounce bottle of G2 or Powerade Zero for diabetic patients. (Pediatric patients, are not advised to drink a 12 to 20 ounce carbohydrate drink)    Clear liquids are liquids you can see through.  Nothing red in color.     Plain water                               Sports drinks  Sodas                                   Gelatin (Jell-O)  Fruit juices without pulp such as white grape juice and apple juice  Popsicles that contain no fruit or yogurt  Tea or coffee (no cream or milk added)  Gatorade / Powerade  G2 / Powerade Zero     • Patients who avoid smoking, chewing tobacco and alcohol for 4 weeks prior to surgery have a reduced risk of post-operative complications.  Quit smoking as many days before surgery as you can.  • Do not smoke, use chewing tobacco or drink alcohol the day of surgery.   • If applicable bring your C-PAP/ BI-PAP machine.  • Bring any papers given to you in the doctor’s office.  • Wear clean comfortable clothes.  • Do not wear contact lenses, false eyelashes or make-up.  Bring a case for your glasses.   • Bring crutches or walker if applicable.  • Remove all piercings.  Leave jewelry and any other valuables at home.  • Hair extensions with metal clips must be removed prior to surgery.  • The Pre-Admission Testing nurse will instruct you to bring medications if unable to obtain an accurate list in Pre-Admission Testing.         You were given a blood bank ID arm band remember to bring it with you the day of surgery.    Preventing a Surgical Site Infection:  • For 2 to 3 days before surgery, avoid shaving with a razor because the razor can irritate skin and make it easier to develop an infection.    • Any areas of open skin can increase the risk of a post-operative wound infection by allowing bacteria to enter and travel throughout the body.  Notify your surgeon if you have any skin wounds / rashes even if it is not near the expected surgical site.  The area will need assessed to determine if surgery should be delayed until it is healed.  • The night prior to surgery shower using a fresh bar of anti-bacterial soap (such as Dial) and clean washcloth.  Sleep in a clean bed with clean clothing.  Do not allow pets to sleep with you.  • Shower on the morning of surgery using a fresh bar of anti-bacterial soap (such as Dial) and clean washcloth.  Dry with a clean towel and dress in clean clothing.  • Ask your surgeon if you will be receiving antibiotics prior to surgery.  • Make sure you, your family, and all healthcare providers clean their hands with soap and water or an alcohol based hand  before caring for you or your wound.    Day of surgery:07- ARRIVE @ 0600 AM REPORT TO THE MAIN OR   Your arrival time is approximately two hours before your scheduled surgery time.  Upon arrival, a Pre-op nurse and Anesthesiologist will review your health history, obtain vital signs, and answer questions you may have.  The only belongings needed at this time will be a list of your home medications and if applicable your C-PAP/BI-PAP machine.  If you are staying overnight your family can leave the rest of your belongings in the car and bring them to your room later.  A Pre-op nurse will start an IV and you may receive medication in preparation for surgery, including something to help you relax.  Your family will be able to see you in the  Pre-op area.  Two visitors at a time will be allowed in the Pre-op room.  While you are in surgery your family should notify the waiting room  if they leave the waiting room area and provide a contact phone number.    Please be aware that surgery does come with discomfort.  We want to make every effort to control your discomfort so please discuss any uncontrolled symptoms with your nurse.   Your doctor will most likely have prescribed pain medications.      If you are going home after surgery you will receive individualized written care instructions before being discharged.  A responsible adult must drive you to and from the hospital on the day of your surgery and stay with you for 24 hours.    If you are staying overnight following surgery, you will be transported to your hospital room following the recovery period.  [facility] has all private rooms.    If you have any questions please call Pre-Admission Testing at [phone].  Deductibles and co-payments are collected on the day of service. Please be prepared to pay the required co-pay, deductible or deposit on the day of service as defined by your plan.    Patient Education for Self-Quarantine Process    Following your COVID testing, we strongly recommend that you do not leave your home after you have been tested for COVID except to get medical care. This includes not going to work, school or to public areas.  If this is not possible for you to do please limit your activities to only required outings.  Be sure to wear a mask when you are with other people, practice social distancing and wash your hands frequently.      The following items provide additional details to keep you safe.  • Wash your hands with soap and water frequently for at least 20 seconds.   • Avoid touching your eyes, nose and mouth with unwashed hands.  • Do not share anything - utensils, towels, food from the same bowl.   • Have your own utensils, drinking glass,  dishes, towels and bedding.   • Do not have visitors.   • Do use FaceTime to stay in touch with family and friends.  • You should stay in a specific room away from others if possible.   • Stay at least 6 feet away from others in the home if you cannot have a dedicated room to yourself.   • Do not snuggle with your pet. While the CDC says there is no evidence that pets can spread COVID-19 or be infected from humans, it is probably best to avoid “petting, snuggling, being kissed or licked and sharing food (during self-quarantine)”, according to the CDC.   • Sanitize household surfaces daily. Include all high touch areas (door handles, light switches, phones, countertops, etc.)  • Do not share a bathroom with others, if possible.   • Wear a mask around others in your home if you are unable to stay in a separate room or 6 feet apart. If  you are unable to wear a mask, have your family member wear a mask if they must be within 6 feet of you.       Call your surgeon immediately if you experience any of the following symptoms:  • Sore Throat  • Shortness of Breath or difficulty breathing  • Cough  • Chills  • Body soreness or muscle pain  • Headache  • Fever  • New loss of taste or smell  • Do not arrive for your surgery ill.  Your procedure will need to be rescheduled to another time.  You will need to call your physician before the day of surgery to avoid any unnecessary exposure to hospital staff as well as other patients.

## 2020-07-21 NOTE — TELEPHONE ENCOUNTER
At least two weeks then. I really don't want her taking care of the child until the first postoperative visit. You can put a letter in to that effect, and I'll sign.

## 2020-07-21 NOTE — TELEPHONE ENCOUNTER
Ms. Coronel called to ask how long she should need her  to be off. She states that they have a special needs child at home and that should be factored in. She needs a letter about it.    She is on schedule for a C6/7 ACDF on 7/23.

## 2020-07-22 LAB
REF LAB TEST METHOD: NORMAL
SARS-COV-2 RNA RESP QL NAA+PROBE: NOT DETECTED

## 2020-07-23 ENCOUNTER — APPOINTMENT (OUTPATIENT)
Dept: GENERAL RADIOLOGY | Facility: HOSPITAL | Age: 40
End: 2020-07-23

## 2020-07-23 ENCOUNTER — ANESTHESIA (OUTPATIENT)
Dept: PERIOP | Facility: HOSPITAL | Age: 40
End: 2020-07-23

## 2020-07-23 ENCOUNTER — ANESTHESIA EVENT (OUTPATIENT)
Dept: PERIOP | Facility: HOSPITAL | Age: 40
End: 2020-07-23

## 2020-07-23 ENCOUNTER — HOSPITAL ENCOUNTER (OUTPATIENT)
Facility: HOSPITAL | Age: 40
Discharge: HOME | End: 2020-07-24
Attending: NEUROLOGICAL SURGERY | Admitting: NEUROLOGICAL SURGERY

## 2020-07-23 DIAGNOSIS — M50.20 CERVICAL DISC HERNIATION: Primary | ICD-10-CM

## 2020-07-23 PROCEDURE — 25010000002 NEOSTIGMINE PER 0.5 MG: Performed by: NURSE ANESTHETIST, CERTIFIED REGISTERED

## 2020-07-23 PROCEDURE — C1713 ANCHOR/SCREW BN/BN,TIS/BN: HCPCS | Performed by: NEUROLOGICAL SURGERY

## 2020-07-23 PROCEDURE — 25010000002 MIDAZOLAM PER 1 MG: Performed by: ANESTHESIOLOGY

## 2020-07-23 PROCEDURE — 25010000002 FENTANYL CITRATE (PF) 100 MCG/2ML SOLUTION: Performed by: NURSE ANESTHETIST, CERTIFIED REGISTERED

## 2020-07-23 PROCEDURE — 25010000002 HYDROMORPHONE PER 4 MG: Performed by: NURSE ANESTHETIST, CERTIFIED REGISTERED

## 2020-07-23 PROCEDURE — 25010000002 PROPOFOL 10 MG/ML EMULSION: Performed by: NURSE ANESTHETIST, CERTIFIED REGISTERED

## 2020-07-23 PROCEDURE — 76000 FLUOROSCOPY <1 HR PHYS/QHP: CPT

## 2020-07-23 PROCEDURE — 22853 INSJ BIOMECHANICAL DEVICE: CPT | Performed by: NEUROLOGICAL SURGERY

## 2020-07-23 PROCEDURE — 22551 ARTHRD ANT NTRBDY CERVICAL: CPT | Performed by: NEUROLOGICAL SURGERY

## 2020-07-23 PROCEDURE — 25010000002 MIDAZOLAM PER 1 MG: Performed by: NURSE ANESTHETIST, CERTIFIED REGISTERED

## 2020-07-23 PROCEDURE — 72040 X-RAY EXAM NECK SPINE 2-3 VW: CPT

## 2020-07-23 PROCEDURE — 25010000002 DEXAMETHASONE PER 1 MG: Performed by: NURSE ANESTHETIST, CERTIFIED REGISTERED

## 2020-07-23 PROCEDURE — 25010000003 CEFAZOLIN IN DEXTROSE 2-4 GM/100ML-% SOLUTION: Performed by: NEUROLOGICAL SURGERY

## 2020-07-23 PROCEDURE — L0120 CERV FLEX N/ADJ FOAM PRE OTS: HCPCS | Performed by: NEUROLOGICAL SURGERY

## 2020-07-23 PROCEDURE — 22551 ARTHRD ANT NTRBDY CERVICAL: CPT | Performed by: SPECIALIST/TECHNOLOGIST, OTHER

## 2020-07-23 PROCEDURE — 22853 INSJ BIOMECHANICAL DEVICE: CPT | Performed by: SPECIALIST/TECHNOLOGIST, OTHER

## 2020-07-23 PROCEDURE — 25010000002 ONDANSETRON PER 1 MG: Performed by: NURSE ANESTHETIST, CERTIFIED REGISTERED

## 2020-07-23 PROCEDURE — 25010000003 CEFAZOLIN PER 500 MG: Performed by: NEUROLOGICAL SURGERY

## 2020-07-23 PROCEDURE — 22845 INSERT SPINE FIXATION DEVICE: CPT | Performed by: NEUROLOGICAL SURGERY

## 2020-07-23 PROCEDURE — 22845 INSERT SPINE FIXATION DEVICE: CPT | Performed by: SPECIALIST/TECHNOLOGIST, OTHER

## 2020-07-23 PROCEDURE — 20930 SP BONE ALGRFT MORSEL ADD-ON: CPT | Performed by: NEUROLOGICAL SURGERY

## 2020-07-23 PROCEDURE — 25010000002 METHOCARBAMOL 1000 MG/10ML SOLUTION: Performed by: NEUROLOGICAL SURGERY

## 2020-07-23 DEVICE — SCREW 3120314 4.0 X 14 SELF TAP VAR
Type: IMPLANTABLE DEVICE | Site: SPINE CERVICAL | Status: FUNCTIONAL
Brand: ATLANTIS® ANTERIOR CERVICAL PLATE SYSTEM

## 2020-07-23 DEVICE — WAX BONE HEMO NAT 2.5G: Type: IMPLANTABLE DEVICE | Site: SPINE CERVICAL | Status: FUNCTIONAL

## 2020-07-23 DEVICE — FLOSEAL HEMOSTATIC MATRIX, 10ML
Type: IMPLANTABLE DEVICE | Site: SPINE CERVICAL | Status: FUNCTIONAL
Brand: FLOSEAL HEMOSTATIC MATRIX

## 2020-07-23 DEVICE — PUTTY DBF GRAFTON 3CC: Type: IMPLANTABLE DEVICE | Site: SPINE CERVICAL | Status: FUNCTIONAL

## 2020-07-23 DEVICE — INTERBODY FUSION DEVICE NANOLOCK SURFACE TECHNOLOGY 6 DEGREE SMALL 7MM
Type: IMPLANTABLE DEVICE | Site: SPINE CERVICAL | Status: FUNCTIONAL
Brand: ENDOSKELETON TC

## 2020-07-23 RX ORDER — PROMETHAZINE HYDROCHLORIDE 25 MG/1
25 TABLET ORAL ONCE AS NEEDED
Status: DISCONTINUED | OUTPATIENT
Start: 2020-07-23 | End: 2020-07-23 | Stop reason: HOSPADM

## 2020-07-23 RX ORDER — CYCLOBENZAPRINE HCL 10 MG
10 TABLET ORAL 3 TIMES DAILY PRN
Status: DISCONTINUED | OUTPATIENT
Start: 2020-07-23 | End: 2020-07-24 | Stop reason: HOSPADM

## 2020-07-23 RX ORDER — ONDANSETRON 2 MG/ML
INJECTION INTRAMUSCULAR; INTRAVENOUS AS NEEDED
Status: DISCONTINUED | OUTPATIENT
Start: 2020-07-23 | End: 2020-07-23 | Stop reason: SURG

## 2020-07-23 RX ORDER — DIPHENHYDRAMINE HYDROCHLORIDE 50 MG/ML
12.5 INJECTION INTRAMUSCULAR; INTRAVENOUS
Status: DISCONTINUED | OUTPATIENT
Start: 2020-07-23 | End: 2020-07-23 | Stop reason: HOSPADM

## 2020-07-23 RX ORDER — HYDROMORPHONE HYDROCHLORIDE 1 MG/ML
0.5 INJECTION, SOLUTION INTRAMUSCULAR; INTRAVENOUS; SUBCUTANEOUS
Status: DISCONTINUED | OUTPATIENT
Start: 2020-07-23 | End: 2020-07-23 | Stop reason: HOSPADM

## 2020-07-23 RX ORDER — SODIUM CHLORIDE 0.9 % (FLUSH) 0.9 %
10 SYRINGE (ML) INJECTION AS NEEDED
Status: DISCONTINUED | OUTPATIENT
Start: 2020-07-23 | End: 2020-07-23 | Stop reason: HOSPADM

## 2020-07-23 RX ORDER — GLYCOPYRROLATE 0.2 MG/ML
INJECTION INTRAMUSCULAR; INTRAVENOUS AS NEEDED
Status: DISCONTINUED | OUTPATIENT
Start: 2020-07-23 | End: 2020-07-23 | Stop reason: SURG

## 2020-07-23 RX ORDER — METOPROLOL SUCCINATE 50 MG/1
25 TABLET, EXTENDED RELEASE ORAL NIGHTLY
Status: DISCONTINUED | OUTPATIENT
Start: 2020-07-23 | End: 2020-07-24 | Stop reason: HOSPADM

## 2020-07-23 RX ORDER — PROMETHAZINE HYDROCHLORIDE 25 MG/1
25 SUPPOSITORY RECTAL ONCE AS NEEDED
Status: DISCONTINUED | OUTPATIENT
Start: 2020-07-23 | End: 2020-07-23 | Stop reason: HOSPADM

## 2020-07-23 RX ORDER — ONDANSETRON 2 MG/ML
4 INJECTION INTRAMUSCULAR; INTRAVENOUS EVERY 6 HOURS PRN
Status: DISCONTINUED | OUTPATIENT
Start: 2020-07-23 | End: 2020-07-24 | Stop reason: HOSPADM

## 2020-07-23 RX ORDER — PROPOFOL 10 MG/ML
VIAL (ML) INTRAVENOUS AS NEEDED
Status: DISCONTINUED | OUTPATIENT
Start: 2020-07-23 | End: 2020-07-23 | Stop reason: SURG

## 2020-07-23 RX ORDER — HYDRALAZINE HYDROCHLORIDE 20 MG/ML
5 INJECTION INTRAMUSCULAR; INTRAVENOUS
Status: DISCONTINUED | OUTPATIENT
Start: 2020-07-23 | End: 2020-07-23 | Stop reason: HOSPADM

## 2020-07-23 RX ORDER — FENTANYL CITRATE 50 UG/ML
INJECTION, SOLUTION INTRAMUSCULAR; INTRAVENOUS AS NEEDED
Status: DISCONTINUED | OUTPATIENT
Start: 2020-07-23 | End: 2020-07-23 | Stop reason: SURG

## 2020-07-23 RX ORDER — SODIUM CHLORIDE, SODIUM LACTATE, POTASSIUM CHLORIDE, CALCIUM CHLORIDE 600; 310; 30; 20 MG/100ML; MG/100ML; MG/100ML; MG/100ML
75 INJECTION, SOLUTION INTRAVENOUS CONTINUOUS
Status: DISCONTINUED | OUTPATIENT
Start: 2020-07-23 | End: 2020-07-24 | Stop reason: HOSPADM

## 2020-07-23 RX ORDER — METHOCARBAMOL 100 MG/ML
500 INJECTION, SOLUTION INTRAMUSCULAR; INTRAVENOUS ONCE
Status: COMPLETED | OUTPATIENT
Start: 2020-07-23 | End: 2020-07-23

## 2020-07-23 RX ORDER — ACETAMINOPHEN 325 MG/1
650 TABLET ORAL EVERY 4 HOURS PRN
Status: DISCONTINUED | OUTPATIENT
Start: 2020-07-23 | End: 2020-07-24 | Stop reason: HOSPADM

## 2020-07-23 RX ORDER — SODIUM CHLORIDE 0.9 % (FLUSH) 0.9 %
3 SYRINGE (ML) INJECTION EVERY 12 HOURS SCHEDULED
Status: DISCONTINUED | OUTPATIENT
Start: 2020-07-23 | End: 2020-07-24 | Stop reason: HOSPADM

## 2020-07-23 RX ORDER — NALOXONE HCL 0.4 MG/ML
0.4 VIAL (ML) INJECTION
Status: DISCONTINUED | OUTPATIENT
Start: 2020-07-23 | End: 2020-07-24 | Stop reason: HOSPADM

## 2020-07-23 RX ORDER — LABETALOL HYDROCHLORIDE 5 MG/ML
5 INJECTION, SOLUTION INTRAVENOUS
Status: DISCONTINUED | OUTPATIENT
Start: 2020-07-23 | End: 2020-07-23 | Stop reason: HOSPADM

## 2020-07-23 RX ORDER — HYDROMORPHONE HCL 110MG/55ML
PATIENT CONTROLLED ANALGESIA SYRINGE INTRAVENOUS AS NEEDED
Status: DISCONTINUED | OUTPATIENT
Start: 2020-07-23 | End: 2020-07-23 | Stop reason: SURG

## 2020-07-23 RX ORDER — MIDAZOLAM HYDROCHLORIDE 1 MG/ML
INJECTION INTRAMUSCULAR; INTRAVENOUS AS NEEDED
Status: DISCONTINUED | OUTPATIENT
Start: 2020-07-23 | End: 2020-07-23 | Stop reason: SURG

## 2020-07-23 RX ORDER — FLUOXETINE HYDROCHLORIDE 20 MG/1
40 CAPSULE ORAL DAILY
Status: DISCONTINUED | OUTPATIENT
Start: 2020-07-23 | End: 2020-07-24 | Stop reason: HOSPADM

## 2020-07-23 RX ORDER — NALOXONE HCL 0.4 MG/ML
0.2 VIAL (ML) INJECTION AS NEEDED
Status: DISCONTINUED | OUTPATIENT
Start: 2020-07-23 | End: 2020-07-23 | Stop reason: HOSPADM

## 2020-07-23 RX ORDER — DIPHENHYDRAMINE HCL 25 MG
25 CAPSULE ORAL
Status: DISCONTINUED | OUTPATIENT
Start: 2020-07-23 | End: 2020-07-23 | Stop reason: HOSPADM

## 2020-07-23 RX ORDER — PROMETHAZINE HYDROCHLORIDE 25 MG/ML
12.5 INJECTION, SOLUTION INTRAMUSCULAR; INTRAVENOUS ONCE AS NEEDED
Status: DISCONTINUED | OUTPATIENT
Start: 2020-07-23 | End: 2020-07-23 | Stop reason: HOSPADM

## 2020-07-23 RX ORDER — ONDANSETRON 2 MG/ML
4 INJECTION INTRAMUSCULAR; INTRAVENOUS ONCE AS NEEDED
Status: DISCONTINUED | OUTPATIENT
Start: 2020-07-23 | End: 2020-07-23 | Stop reason: HOSPADM

## 2020-07-23 RX ORDER — PROMETHAZINE HYDROCHLORIDE 25 MG/ML
6.25 INJECTION, SOLUTION INTRAMUSCULAR; INTRAVENOUS
Status: DISCONTINUED | OUTPATIENT
Start: 2020-07-23 | End: 2020-07-23 | Stop reason: HOSPADM

## 2020-07-23 RX ORDER — MIDAZOLAM HYDROCHLORIDE 1 MG/ML
1 INJECTION INTRAMUSCULAR; INTRAVENOUS
Status: DISCONTINUED | OUTPATIENT
Start: 2020-07-23 | End: 2020-07-23 | Stop reason: HOSPADM

## 2020-07-23 RX ORDER — CEFAZOLIN SODIUM 2 G/100ML
2 INJECTION, SOLUTION INTRAVENOUS EVERY 8 HOURS
Status: COMPLETED | OUTPATIENT
Start: 2020-07-23 | End: 2020-07-24

## 2020-07-23 RX ORDER — ONDANSETRON 4 MG/1
4 TABLET, FILM COATED ORAL EVERY 6 HOURS PRN
Status: DISCONTINUED | OUTPATIENT
Start: 2020-07-23 | End: 2020-07-24 | Stop reason: HOSPADM

## 2020-07-23 RX ORDER — QUETIAPINE FUMARATE 25 MG/1
25 TABLET, FILM COATED ORAL NIGHTLY
Status: DISCONTINUED | OUTPATIENT
Start: 2020-07-23 | End: 2020-07-24 | Stop reason: HOSPADM

## 2020-07-23 RX ORDER — EPHEDRINE SULFATE 50 MG/ML
5 INJECTION, SOLUTION INTRAVENOUS ONCE AS NEEDED
Status: DISCONTINUED | OUTPATIENT
Start: 2020-07-23 | End: 2020-07-23 | Stop reason: HOSPADM

## 2020-07-23 RX ORDER — SODIUM CHLORIDE 0.9 % (FLUSH) 0.9 %
10 SYRINGE (ML) INJECTION AS NEEDED
Status: DISCONTINUED | OUTPATIENT
Start: 2020-07-23 | End: 2020-07-24 | Stop reason: HOSPADM

## 2020-07-23 RX ORDER — HYDROCODONE BITARTRATE AND ACETAMINOPHEN 7.5; 325 MG/1; MG/1
1 TABLET ORAL EVERY 4 HOURS PRN
Status: DISCONTINUED | OUTPATIENT
Start: 2020-07-23 | End: 2020-07-24 | Stop reason: HOSPADM

## 2020-07-23 RX ORDER — DEXAMETHASONE SODIUM PHOSPHATE 10 MG/ML
INJECTION INTRAMUSCULAR; INTRAVENOUS AS NEEDED
Status: DISCONTINUED | OUTPATIENT
Start: 2020-07-23 | End: 2020-07-23 | Stop reason: SURG

## 2020-07-23 RX ORDER — SODIUM CHLORIDE 0.9 % (FLUSH) 0.9 %
10 SYRINGE (ML) INJECTION EVERY 12 HOURS SCHEDULED
Status: DISCONTINUED | OUTPATIENT
Start: 2020-07-23 | End: 2020-07-23 | Stop reason: HOSPADM

## 2020-07-23 RX ORDER — FLUMAZENIL 0.1 MG/ML
0.2 INJECTION INTRAVENOUS AS NEEDED
Status: DISCONTINUED | OUTPATIENT
Start: 2020-07-23 | End: 2020-07-23 | Stop reason: HOSPADM

## 2020-07-23 RX ORDER — ROCURONIUM BROMIDE 10 MG/ML
INJECTION, SOLUTION INTRAVENOUS AS NEEDED
Status: DISCONTINUED | OUTPATIENT
Start: 2020-07-23 | End: 2020-07-23 | Stop reason: SURG

## 2020-07-23 RX ORDER — FAMOTIDINE 10 MG/ML
20 INJECTION, SOLUTION INTRAVENOUS
Status: COMPLETED | OUTPATIENT
Start: 2020-07-23 | End: 2020-07-23

## 2020-07-23 RX ORDER — SODIUM CHLORIDE, SODIUM LACTATE, POTASSIUM CHLORIDE, CALCIUM CHLORIDE 600; 310; 30; 20 MG/100ML; MG/100ML; MG/100ML; MG/100ML
9 INJECTION, SOLUTION INTRAVENOUS CONTINUOUS PRN
Status: DISCONTINUED | OUTPATIENT
Start: 2020-07-23 | End: 2020-07-23 | Stop reason: HOSPADM

## 2020-07-23 RX ORDER — FENTANYL CITRATE 50 UG/ML
50 INJECTION, SOLUTION INTRAMUSCULAR; INTRAVENOUS
Status: DISCONTINUED | OUTPATIENT
Start: 2020-07-23 | End: 2020-07-23 | Stop reason: HOSPADM

## 2020-07-23 RX ORDER — HYDROMORPHONE HYDROCHLORIDE 1 MG/ML
0.5 INJECTION, SOLUTION INTRAMUSCULAR; INTRAVENOUS; SUBCUTANEOUS
Status: DISCONTINUED | OUTPATIENT
Start: 2020-07-23 | End: 2020-07-24 | Stop reason: HOSPADM

## 2020-07-23 RX ORDER — LIDOCAINE HYDROCHLORIDE 20 MG/ML
INJECTION, SOLUTION INFILTRATION; PERINEURAL AS NEEDED
Status: DISCONTINUED | OUTPATIENT
Start: 2020-07-23 | End: 2020-07-23 | Stop reason: SURG

## 2020-07-23 RX ORDER — CEFAZOLIN SODIUM 2 G/100ML
2 INJECTION, SOLUTION INTRAVENOUS ONCE
Status: COMPLETED | OUTPATIENT
Start: 2020-07-23 | End: 2020-07-23

## 2020-07-23 RX ORDER — MAGNESIUM HYDROXIDE 1200 MG/15ML
LIQUID ORAL AS NEEDED
Status: DISCONTINUED | OUTPATIENT
Start: 2020-07-23 | End: 2020-07-23 | Stop reason: HOSPADM

## 2020-07-23 RX ORDER — ALPRAZOLAM 0.5 MG/1
0.25 TABLET ORAL DAILY PRN
Status: DISCONTINUED | OUTPATIENT
Start: 2020-07-23 | End: 2020-07-24 | Stop reason: HOSPADM

## 2020-07-23 RX ADMIN — SODIUM CHLORIDE, PRESERVATIVE FREE 3 ML: 5 INJECTION INTRAVENOUS at 15:07

## 2020-07-23 RX ADMIN — ROCURONIUM BROMIDE 10 MG: 10 INJECTION INTRAVENOUS at 08:47

## 2020-07-23 RX ADMIN — HYDROCODONE BITARTRATE AND ACETAMINOPHEN 1 TABLET: 7.5; 325 TABLET ORAL at 14:09

## 2020-07-23 RX ADMIN — PROPOFOL 100 MG: 10 INJECTION, EMULSION INTRAVENOUS at 09:38

## 2020-07-23 RX ADMIN — ONDANSETRON HYDROCHLORIDE 4 MG: 2 SOLUTION INTRAMUSCULAR; INTRAVENOUS at 09:22

## 2020-07-23 RX ADMIN — METOPROLOL SUCCINATE 25 MG: 50 TABLET, EXTENDED RELEASE ORAL at 22:22

## 2020-07-23 RX ADMIN — FENTANYL CITRATE 50 MCG: 50 INJECTION INTRAMUSCULAR; INTRAVENOUS at 08:08

## 2020-07-23 RX ADMIN — CEFAZOLIN SODIUM 2 G: 2 INJECTION, SOLUTION INTRAVENOUS at 15:04

## 2020-07-23 RX ADMIN — MIDAZOLAM 1 MG: 1 INJECTION INTRAMUSCULAR; INTRAVENOUS at 07:45

## 2020-07-23 RX ADMIN — NEOSTIGMINE METHYLSULFATE 3.5 MG: 1 INJECTION INTRAMUSCULAR; INTRAVENOUS; SUBCUTANEOUS at 09:48

## 2020-07-23 RX ADMIN — PROPOFOL 30 MG: 10 INJECTION, EMULSION INTRAVENOUS at 08:19

## 2020-07-23 RX ADMIN — SODIUM CHLORIDE, POTASSIUM CHLORIDE, SODIUM LACTATE AND CALCIUM CHLORIDE 75 ML/HR: 600; 310; 30; 20 INJECTION, SOLUTION INTRAVENOUS at 12:37

## 2020-07-23 RX ADMIN — CYCLOBENZAPRINE 10 MG: 10 TABLET, FILM COATED ORAL at 22:24

## 2020-07-23 RX ADMIN — PROPOFOL 50 MG: 10 INJECTION, EMULSION INTRAVENOUS at 08:48

## 2020-07-23 RX ADMIN — FAMOTIDINE 20 MG: 10 INJECTION INTRAVENOUS at 07:45

## 2020-07-23 RX ADMIN — HYDROCODONE BITARTRATE AND ACETAMINOPHEN 1 TABLET: 7.5; 325 TABLET ORAL at 17:58

## 2020-07-23 RX ADMIN — CEFAZOLIN SODIUM 2 G: 2 INJECTION, SOLUTION INTRAVENOUS at 07:56

## 2020-07-23 RX ADMIN — PROPOFOL 50 MG: 10 INJECTION, EMULSION INTRAVENOUS at 08:47

## 2020-07-23 RX ADMIN — GLYCOPYRROLATE 0.5 MG: 0.2 INJECTION INTRAMUSCULAR; INTRAVENOUS at 09:47

## 2020-07-23 RX ADMIN — PROPOFOL 20 MG: 10 INJECTION, EMULSION INTRAVENOUS at 08:07

## 2020-07-23 RX ADMIN — SODIUM CHLORIDE, POTASSIUM CHLORIDE, SODIUM LACTATE AND CALCIUM CHLORIDE: 600; 310; 30; 20 INJECTION, SOLUTION INTRAVENOUS at 09:42

## 2020-07-23 RX ADMIN — SODIUM CHLORIDE, POTASSIUM CHLORIDE, SODIUM LACTATE AND CALCIUM CHLORIDE 9 ML/HR: 600; 310; 30; 20 INJECTION, SOLUTION INTRAVENOUS at 07:46

## 2020-07-23 RX ADMIN — LIDOCAINE HYDROCHLORIDE 100 MG: 20 INJECTION, SOLUTION INFILTRATION; PERINEURAL at 08:08

## 2020-07-23 RX ADMIN — FENTANYL CITRATE 50 MCG: 50 INJECTION INTRAMUSCULAR; INTRAVENOUS at 08:07

## 2020-07-23 RX ADMIN — HYDROCODONE BITARTRATE AND ACETAMINOPHEN 1 TABLET: 7.5; 325 TABLET ORAL at 22:29

## 2020-07-23 RX ADMIN — METHOCARBAMOL 500 MG: 100 INJECTION, SOLUTION INTRAMUSCULAR; INTRAVENOUS at 10:59

## 2020-07-23 RX ADMIN — QUETIAPINE FUMARATE 25 MG: 25 TABLET ORAL at 22:21

## 2020-07-23 RX ADMIN — ROCURONIUM BROMIDE 20 MG: 10 INJECTION INTRAVENOUS at 09:00

## 2020-07-23 RX ADMIN — DEXAMETHASONE SODIUM PHOSPHATE 10 MG: 10 INJECTION INTRAMUSCULAR; INTRAVENOUS at 08:20

## 2020-07-23 RX ADMIN — FENTANYL CITRATE 50 MCG: 50 INJECTION, SOLUTION INTRAMUSCULAR; INTRAVENOUS at 10:58

## 2020-07-23 RX ADMIN — HYDROMORPHONE HYDROCHLORIDE 1 MG: 2 INJECTION, SOLUTION INTRAMUSCULAR; INTRAVENOUS; SUBCUTANEOUS at 09:39

## 2020-07-23 RX ADMIN — MIDAZOLAM 2 MG: 1 INJECTION INTRAMUSCULAR; INTRAVENOUS at 08:48

## 2020-07-23 RX ADMIN — ROCURONIUM BROMIDE 40 MG: 10 INJECTION INTRAVENOUS at 08:09

## 2020-07-23 RX ADMIN — FENTANYL CITRATE 50 MCG: 50 INJECTION, SOLUTION INTRAMUSCULAR; INTRAVENOUS at 11:37

## 2020-07-23 RX ADMIN — FLUOXETINE HYDROCHLORIDE 40 MG: 20 CAPSULE ORAL at 15:03

## 2020-07-23 RX ADMIN — PROPOFOL 150 MG: 10 INJECTION, EMULSION INTRAVENOUS at 08:09

## 2020-07-23 NOTE — ANESTHESIA PREPROCEDURE EVALUATION
Anesthesia Evaluation     Patient summary reviewed                Airway   Mallampati: II  Neck ROM: limited  No difficulty expected  Dental      Pulmonary    Cardiovascular     ECG reviewed  Rhythm: regular    (+) hypertension,       Neuro/Psych  (+) psychiatric history,     GI/Hepatic/Renal/Endo      Musculoskeletal     Abdominal    Substance History      OB/GYN          Other   arthritis, blood dyscrasia (hemochromatosis),                     Anesthesia Plan    ASA 3     general       Anesthetic plan, all risks, benefits, and alternatives have been provided, discussed and informed consent has been obtained with: patient.  Use of blood products discussed with patient .

## 2020-07-23 NOTE — ANESTHESIA POSTPROCEDURE EVALUATION
Patient: Jacqueline Coronel    Procedure Summary     Date:  07/23/20 Room / Location:  [facility] OR [address] [place] [address] MAIN OR    Anesthesia Start:  0756 Anesthesia Stop:  1009    Procedure:  S-4-G-7CERVICAL DISCECTOMY ANTERIOR FUSION WITH INSTRUMENTATION WITH CAGE AND  PLATE (N/A Spine Cervical) Diagnosis:      Surgeon:  Shar Muñoz MD Provider:  Richard Aguilar MD    Anesthesia Type:  general ASA Status:  3          Anesthesia Type: general    Vitals  Vitals Value Taken Time   /80 7/23/2020 11:15 AM   Temp 36.5 °C (97.7 °F) 7/23/2020 10:06 AM   Pulse 72 7/23/2020 11:29 AM   Resp 12 7/23/2020 10:45 AM   SpO2 100 % 7/23/2020 11:29 AM   Vitals shown include unvalidated device data.        Post Anesthesia Care and Evaluation    Patient location during evaluation: PACU  Patient participation: complete - patient participated  Level of consciousness: awake and alert  Pain management: adequate  Airway patency: patent  Anesthetic complications: No anesthetic complications    Cardiovascular status: acceptable  Respiratory status: acceptable  Hydration status: acceptable    Comments: --------------------            07/23/20               1045     --------------------   BP:       119/75     Pulse:      79       Resp:       12       Temp:                SpO2:      99%      --------------------

## 2020-07-24 VITALS
HEART RATE: 72 BPM | WEIGHT: 150.38 LBS | TEMPERATURE: 97.8 F | DIASTOLIC BLOOD PRESSURE: 63 MMHG | HEIGHT: 67 IN | RESPIRATION RATE: 16 BRPM | OXYGEN SATURATION: 100 % | BODY MASS INDEX: 23.6 KG/M2 | SYSTOLIC BLOOD PRESSURE: 97 MMHG

## 2020-07-24 PROCEDURE — 25010000003 CEFAZOLIN IN DEXTROSE 2-4 GM/100ML-% SOLUTION: Performed by: NEUROLOGICAL SURGERY

## 2020-07-24 PROCEDURE — 99024 POSTOP FOLLOW-UP VISIT: CPT | Performed by: NURSE PRACTITIONER

## 2020-07-24 RX ORDER — HYDROCODONE BITARTRATE AND ACETAMINOPHEN 7.5; 325 MG/1; MG/1
1 TABLET ORAL EVERY 4 HOURS PRN
Qty: 35 TABLET | Refills: 0 | Status: SHIPPED | OUTPATIENT
Start: 2020-07-24 | End: 2020-08-02

## 2020-07-24 RX ORDER — ACETAMINOPHEN 325 MG/1
650 TABLET ORAL EVERY 4 HOURS PRN
COMMUNITY
Start: 2020-07-24

## 2020-07-24 RX ORDER — CYCLOBENZAPRINE HCL 10 MG
10 TABLET ORAL 3 TIMES DAILY PRN
Start: 2020-07-24 | End: 2021-12-28

## 2020-07-24 RX ADMIN — HYDROCODONE BITARTRATE AND ACETAMINOPHEN 1 TABLET: 7.5; 325 TABLET ORAL at 07:53

## 2020-07-24 RX ADMIN — HYDROCODONE BITARTRATE AND ACETAMINOPHEN 1 TABLET: 7.5; 325 TABLET ORAL at 02:46

## 2020-07-24 RX ADMIN — SODIUM CHLORIDE, PRESERVATIVE FREE 3 ML: 5 INJECTION INTRAVENOUS at 07:55

## 2020-07-24 RX ADMIN — FLUOXETINE HYDROCHLORIDE 40 MG: 20 CAPSULE ORAL at 07:54

## 2020-07-24 RX ADMIN — CEFAZOLIN SODIUM 2 G: 2 INJECTION, SOLUTION INTRAVENOUS at 00:50

## 2020-07-27 ENCOUNTER — TELEPHONE (OUTPATIENT)
Dept: NEUROSURGERY | Facility: CLINIC | Age: 40
End: 2020-07-27

## 2020-07-27 NOTE — TELEPHONE ENCOUNTER
Patient called and had some questions regarding her post op instructions with the peroxide and medications.  She wanted to know if she can take flexeril at night and baclofen (which she had gotten from her pcp) during the day.

## 2020-07-27 NOTE — TELEPHONE ENCOUNTER
Spoke with patient and informed her that she can alternate Baclofen with Flexeril, but cannot take at the same time    Went over post op instructions

## 2020-07-28 ENCOUNTER — TELEPHONE (OUTPATIENT)
Dept: NEUROSURGERY | Facility: CLINIC | Age: 40
End: 2020-07-28

## 2020-07-28 NOTE — TELEPHONE ENCOUNTER
Holly with [facility] is needing for you to call her with the codes that were used to precert her surgery    She can be reached at [phone]

## 2020-07-29 ENCOUNTER — TELEPHONE (OUTPATIENT)
Dept: NEUROSURGERY | Facility: CLINIC | Age: 40
End: 2020-07-29

## 2020-07-29 NOTE — TELEPHONE ENCOUNTER
I attempted to contact Ms. Coronel for a post op check.  Her answering machine picked up.  I left a message for her to call the office only if she had questions or concerns about her care.  I reminded her of her appointment on 8/10.

## 2020-08-05 NOTE — PROGRESS NOTES
Subjective   History of Present Illness: Jacqueline Coronel is a 40 y.o. female is here today for post op appt.  Patient had surgery 7.23.20 C6/7 ACDF    Patient states that she is having problems with swallowing, both liquids and solids, No problems with her incision, no redness, swelling or drainage, steri strips are not present, she has right hand/finger N/T, she denies neck or arm pain, she still has right arm weakness.  She denies urinary incontinence or problems wit her balance and gait.  She wears her soft cervical collar when up and about.    She is taking Norco 7.5/325 BID and Flexeril hs and Gabapentin 400 mg per day    It has been a little over 2 weeks since she underwent an ACDF at C6-C7.  She had severe right arm pain and weakness from a fairly substantial herniated disc.  The pain and the weakness are all gone.  She still has some difficulty swallowing and a stiff neck, which is not unusual and I reassured her should get better.  Her range of motion is still limited.  The incision is a little bit raised and I reassured her that as the subcuticular sutures reabsorb, it will flatten out.  I told her she could use in about 2 weeks some vitamin E on the incision.  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  She still needs some help doing this and I do not want her to do much lifting using her arms or above her head.  We will start some physical therapy.  She wants to return to work in 2 weeks part-time and then phase in full-time work by about 8 weeks, which is fine with me.  We will get x-rays before the next visit, which will be a tele-visit in about 6 weeks.      History of Present Illness    The following portions of the patient's history were reviewed and updated as appropriate: allergies, current medications, past family history, past medical history, past social history, past surgical history and problem list.    Review of Systems   Constitutional: Negative for fever.        Objective     There were no vitals filed for this visit.  There is no height or weight on file to calculate BMI.      Physical Exam   Constitutional: She is oriented to person, place, and time. She appears well-developed and well-nourished.   HENT:   Head: Normocephalic and atraumatic.   Eyes: Pupils are equal, round, and reactive to light. Conjunctivae and EOM are normal.   Fundoscopic exam:       The right eye shows no papilledema. The right eye shows venous pulsations.        The left eye shows no papilledema. The left eye shows venous pulsations.   Neck: Carotid bruit is not present.   Neurological: She is oriented to person, place, and time. She has a normal Finger-Nose-Finger Test and a normal Heel to Shin Test. Gait normal.   Reflex Scores:       Tricep reflexes are 2+ on the right side and 2+ on the left side.       Bicep reflexes are 2+ on the right side and 2+ on the left side.       Brachioradialis reflexes are 2+ on the right side and 2+ on the left side.       Patellar reflexes are 2+ on the right side and 2+ on the left side.       Achilles reflexes are 2+ on the right side and 2+ on the left side.  Psychiatric: Her speech is normal.     Neurologic Exam     Mental Status   Oriented to person, place, and time.   Registration of memory: Good recent and remote memory.   Attention: normal. Concentration: normal.   Speech: speech is normal   Level of consciousness: alert  Knowledge: consistent with education.     Cranial Nerves     CN II   Visual fields full to confrontation.   Visual acuity: normal    CN III, IV, VI   Pupils are equal, round, and reactive to light.  Extraocular motions are normal.     CN V   Facial sensation intact.   Right corneal reflex: normal  Left corneal reflex: normal    CN VII   Facial expression full, symmetric.   Right facial weakness: none  Left facial weakness: none    CN VIII   Hearing: intact    CN IX, X   Palate: symmetric    CN XI   Right sternocleidomastoid strength:  normal  Left sternocleidomastoid strength: normal    CN XII   Tongue: not atrophic  Tongue deviation: none    Motor Exam   Muscle bulk: normal  Right arm tone: normal  Left arm tone: normal  Right leg tone: normal  Left leg tone: normal    Strength   Strength 5/5 except as noted.     Sensory Exam   Light touch normal.     Gait, Coordination, and Reflexes     Gait  Gait: normal    Coordination   Finger to nose coordination: normal  Heel to shin coordination: normal    Reflexes   Right brachioradialis: 2+  Left brachioradialis: 2+  Right biceps: 2+  Left biceps: 2+  Right triceps: 2+  Left triceps: 2+  Right patellar: 2+  Left patellar: 2+  Right achilles: 2+  Left achilles: 2+  Right : 2+  Left : 2+          Assessment/Plan   Independent Review of Radiographic Studies:      I personally reviewed the images from the following studies.        Medical Decision Making:      Overall doing well and feeling much better in terms of pain.  I reassured the swelling will get better and the incision will flatten out.  We will start some physical therapy.  She does not need a note for me but she will start returning to work part-time in 2 weeks and phasing a full-time schedule at 8 weeks.  I will do a tele-visit with her in 6 weeks and she will get x-rays between now and then.      Diagnoses and all orders for this visit:    Postoperative visit  -     Ambulatory Referral to Physical Therapy Evaluate and treat  -     XR spine cervical ap and lat w flex and ext; Future      Return in about 6 weeks (around 9/21/2020) for As a tele-visit with me.         Answers for HPI/ROS submitted by the patient on 8/3/2020   What is the primary reason for your visit?: Other  Please describe your symptoms.: Post op visit from ACDF C6-7 on July 23, 2020.  Have you had these symptoms before?: No  How long have you been having these symptoms?: Greater than 2 weeks

## 2020-08-10 ENCOUNTER — OFFICE VISIT (OUTPATIENT)
Dept: NEUROSURGERY | Facility: CLINIC | Age: 40
End: 2020-08-10

## 2020-08-10 DIAGNOSIS — Z48.89 POSTOPERATIVE VISIT: Primary | ICD-10-CM

## 2020-08-10 PROCEDURE — 99024 POSTOP FOLLOW-UP VISIT: CPT | Performed by: NEUROLOGICAL SURGERY

## 2020-08-10 RX ORDER — HYDROCODONE BITARTRATE AND ACETAMINOPHEN 7.5; 325 MG/1; MG/1
1 TABLET ORAL EVERY 6 HOURS PRN
COMMUNITY
End: 2021-01-08

## 2020-08-13 ENCOUNTER — TREATMENT (OUTPATIENT)
Dept: PHYSICAL THERAPY | Facility: CLINIC | Age: 40
End: 2020-08-13

## 2020-08-13 DIAGNOSIS — R29.898 RIGHT ARM WEAKNESS: ICD-10-CM

## 2020-08-13 DIAGNOSIS — Z48.89 POSTOPERATIVE VISIT: ICD-10-CM

## 2020-08-13 DIAGNOSIS — M50.123 CERVICAL DISC DISORDER AT C6-C7 LEVEL WITH RADICULOPATHY: Primary | ICD-10-CM

## 2020-08-13 PROCEDURE — 97140 MANUAL THERAPY 1/> REGIONS: CPT | Performed by: PHYSICAL THERAPIST

## 2020-08-13 PROCEDURE — 97161 PT EVAL LOW COMPLEX 20 MIN: CPT | Performed by: PHYSICAL THERAPIST

## 2020-08-13 PROCEDURE — 97110 THERAPEUTIC EXERCISES: CPT | Performed by: PHYSICAL THERAPIST

## 2020-08-13 NOTE — PROGRESS NOTES
"Physical Therapy Initial Evaluation and Plan of Care    Patient: Jacqueline Coronel   : 1980  Diagnosis/ICD-10 Code:  Cervical disc disorder at C6-C7 level with radiculopathy [M50.123]  Referring practitioner: Shar Muñoz MD  Date of Initial Visit: 2020  Today's Date: 2020  Patient seen for 1 session           Subjective Questionnaire: NDI:    Subjective Evaluation    History of Present Illness  Date of surgery: 2020 (ACDF C6/7)  Mechanism of injury: Patient presents to physical therapy with orders to address her neck following an ACDF of C6/7 on 2020.     The patient states she feels \"a million times better\" compared to pre-op. Prior to surgery she reported her arm was what hurt the most and any movement of the arm was excruciating. She also could hardly lift her head up due to pain. This pain lasted 6 weeks. She reported she randomly began experiencing pain in her shoulder that caused her to get \"zingers\". She went to the MD where she was given a steroid shot. She had a reaction to the shot which caused her to be in the worst pain she has ever experienced.     Since her surgery, she continues to feel a constant ache throughout her neck and arm.  She reports her index finger is always numb and her middle finger and thumb tingle. Her palm also becomes numb at times.    She reports she has been able to sleep okay, but she does wake up a couple times throughout the night. She states that she wakes up feeling tight in her neck.    She states that her R. arm continues to feel extremely weak and she experiences pain when attempting to lift overhead. Her neck ROM is limited and she believes some of that may be due to fear of messing something up. By the end of the day she feels as if her head is too heavy to hold up. She also reported that at times she feels like she is being choked and occasionally has difficulty swallowing food.     She has a lifting restriction of 20 pounds for the " next 4 weeks if possible. She has a 9 year old son with special needs that requires full time supervision and that poses a challenge with her lifting restriction.    She stated she has not been wearing her soft cervical collar often, but she does use the ice collar every once in a while.      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: Has not worked since a week before surgery   Precautions and Work Restrictions: On medical leave, plans to return part time in 2 weeksQuality of life: good    Pain  Current pain ratin  At best pain ratin  At worst pain ratin (during the evenings)  Location: C6-7 location  Quality: dull ache, throbbing, discomfort and tight  Relieving factors: ice, heat, medications and change in position (Tylenol and Flexeril and Baclofen)  Aggravating factors: overhead activity, prolonged positioning, outstretched reach, repetitive movement, lifting and sleeping (prolonged sitting)  Progression: no change    Social Support  Lives in: one-story house  Lives with: young children, spouse and adult children (3 children: 21, 10, and 9 yr old with special needs)    Hand dominance: right    Treatments  Previous treatment: medication and chiropractic  Current treatment: physical therapy  Patient Goals  Patient goals for therapy: increased strength, decreased pain, increased motion and independence with ADLs/IADLs           Objective          Static Posture   General Observations  Guarded.     Head  Forward.    Palpation   Left   No palpable tenderness to the suboccipitals.   Hypertonic in the levator scapulae and upper trapezius.     Right   No palpable tenderness to the suboccipitals.   Hypertonic in the levator scapulae, scalenes and upper trapezius. Tenderness of the levator scapulae and upper trapezius.     Tenderness   Cervical Spine   Tenderness in the right 1st rib.     Neurological Testing     Sensation     Wrist/Hand   Left   Intact: light touch    Right    Diminished: light touch and hot/cold discrimination  Paresthesia: light touch    Comments   Right hot/cold discrimination: R index finger and thumb     Active Range of Motion   Cervical/Thoracic Spine   Cervical    Flexion: 27 degrees   Extension: 25 degrees   Left lateral flexion: 20 degrees   Right lateral flexion: 15 degrees   Left rotation: 43 degrees   Right rotation: 32 degrees   Left Shoulder   Flexion: 146 degrees     Right Shoulder   Flexion: 137 degrees     Strength/Myotome Testing     Left Shoulder     Planes of Motion   Flexion: 5   Abduction: 4     Right Shoulder     Planes of Motion   Flexion: 3+   Abduction: 3+     Left Elbow   Flexion: 5  Extension: 5    Right Elbow   Flexion: 3+  Extension: 3    Left Wrist/Hand   Wrist extension: 4    Right Wrist/Hand   Wrist extension: 4        Assessment & Plan     Assessment  Impairments: abnormal muscle tone, abnormal or restricted ROM, activity intolerance, impaired physical strength, lacks appropriate home exercise program and pain with function  Assessment details: The patient is a 40 y.o. female who presents to physical therapy today for post operation treatment following her ACDF on July 23, 2020 . Upon initial evaluation, the patient demonstrates the following impairments listed above. Due to these impairments, the patient is unable to tolerate sleeping, lifting, prolonged sitting, reaching overhead and behind back. The patient would benefit from skilled PT services to address functional limitations and impairments and to improve patient quality of life.    Prognosis: good  Functional Limitations: carrying objects, lifting, sleeping, uncomfortable because of pain, moving in bed, sitting, reaching behind back, reaching overhead and unable to perform repetitive tasks  Goals  Plan Goals: STG's: 2 weeks   Patient will report a reduction in pain by >25%  Patient will be able to perform HEP with minimal verbal cues     LTG's: By discharge   Patient will report  a reduction in pain by >75%  Patient will have a >10 point improvement on the Neck Disability Index to demonstrate overall improvement in daily functional level  Patient will be able to reach into overhead cabinet to get a dish without pain or difficulty  Patient will be able to tolerate sitting > 60 minutes without increased pain  Patient will demonstrate sufficient cervical AROM to allow patient to turn her head to view blindspots when driving  Patient will be able to sleep > 5 hours without waking in pain   Patient will be independent with HEP      Plan  Therapy options: will be seen for skilled physical therapy services  Planned modality interventions: cryotherapy, electrical stimulation/Russian stimulation, TENS and thermotherapy (hydrocollator packs)  Planned therapy interventions: body mechanics training, flexibility, functional ROM exercises, home exercise program, manual therapy, neuromuscular re-education, postural training, soft tissue mobilization, strengthening, stretching and ADL retraining  Duration in visits: 12  Treatment plan discussed with: patient        History # of Personal Factors and/or Comorbidities: MODERATE (1-2)  Examination of Body System(s): # of elements: MODERATE (3)  Clinical Presentation: STABLE   Clinical Decision Making: LOW       Timed:         Manual Therapy:    15     mins  12963;     Therapeutic Exercise:    10     mins  01860;     Neuromuscular Micky:        mins  38522;    Therapeutic Activity:          mins  86797;     Gait Training:           mins  21589;     Ultrasound:          mins  41436;    Ionto                                   mins   95771  Self Care                            mins   31516  Canalith Repos         mins 09313      Un-Timed:  Electrical Stimulation:         mins  53505 ( );  Dry Needling          mins self-pay  Traction          mins 31534  Low Eval     25     Mins  07684  Mod Eval          Mins  07870  High Eval                            Mins   79275  Re-Eval                               mins  59234        Timed Treatment:   25   mins   Total Treatment:     50   mins    PT SIGNATURE: Katy Yovani, PT   DATE TREATMENT INITIATED: 8/13/2020    Initial Certification  Certification Period: 11/11/2020  I certify that the therapy services are furnished while this patient is under my care.  The services outlined above are required by this patient, and will be reviewed every 90 days.     PHYSICIAN: Shar Muñoz MD      DATE:     Please sign and return via fax to [phone]. Thank you, [facility].

## 2020-08-17 ENCOUNTER — TREATMENT (OUTPATIENT)
Dept: PHYSICAL THERAPY | Facility: CLINIC | Age: 40
End: 2020-08-17

## 2020-08-17 DIAGNOSIS — Z48.89 POSTOPERATIVE VISIT: ICD-10-CM

## 2020-08-17 DIAGNOSIS — R29.898 RIGHT ARM WEAKNESS: ICD-10-CM

## 2020-08-17 DIAGNOSIS — M50.123 CERVICAL DISC DISORDER AT C6-C7 LEVEL WITH RADICULOPATHY: Primary | ICD-10-CM

## 2020-08-17 PROCEDURE — 97140 MANUAL THERAPY 1/> REGIONS: CPT | Performed by: PHYSICAL THERAPIST

## 2020-08-17 PROCEDURE — 97110 THERAPEUTIC EXERCISES: CPT | Performed by: PHYSICAL THERAPIST

## 2020-08-17 NOTE — PROGRESS NOTES
Physical Therapy Daily Progress Note    VISIT#: 2    Subjective   Jacqueline Coronel reports that looking down to work on household activities causes increased soreness in her lower neck.  She states that she feels a deep ache in her lower cervical spine.  Pain Rating (0-10): 4    Objective     See Exercise, Manual, and Modality Logs for complete treatment.     Patient Education: Continue original HEP    Assessment & Plan     Assessment  Assessment details: Patient had a reduction in tightness following manual therapy and demonstrated improved AROM after treatment today.  She reported a reduction in overall tight sensation throughout her pecs as well.          Progress per Plan of Care and Progress strengthening /stabilization /functional activity            Timed:         Manual Therapy:    30     mins  16128;     Therapeutic Exercise:    20     mins  90404;     Neuromuscular Micky:        mins  12477;    Therapeutic Activity:          mins  37028;     Gait Training:           mins  42862;     Ultrasound:          mins  23425;    Ionto                                   mins   04286  Self Care                            mins   65503  Canalith Repos                   mins  12374    Un-Timed:  Electrical Stimulation:         mins  48562 (MC );  Dry Needling          mins self-pay  Traction          mins 43984  Low Eval          Mins  25145  Mod Eval          Mins  50519  High Eval                            Mins  49816  Re-Eval                               mins  19539    Timed Treatment:   50   mins   Total Treatment:     50   mins    Katy Pina PT  Physical Therapist

## 2020-08-20 ENCOUNTER — TREATMENT (OUTPATIENT)
Dept: PHYSICAL THERAPY | Facility: CLINIC | Age: 40
End: 2020-08-20

## 2020-08-20 DIAGNOSIS — R29.898 RIGHT ARM WEAKNESS: ICD-10-CM

## 2020-08-20 DIAGNOSIS — M50.123 CERVICAL DISC DISORDER AT C6-C7 LEVEL WITH RADICULOPATHY: Primary | ICD-10-CM

## 2020-08-20 PROCEDURE — 97110 THERAPEUTIC EXERCISES: CPT | Performed by: PHYSICAL THERAPIST

## 2020-08-20 PROCEDURE — 97140 MANUAL THERAPY 1/> REGIONS: CPT | Performed by: PHYSICAL THERAPIST

## 2020-08-20 NOTE — PROGRESS NOTES
Physical Therapy Daily Progress Note    VISIT#: 3    Subjective   Jacqueline Coronel reports that she felt better after last treatment and the pec stretching with the wand was helpful.  Pain Rating (0-10): 4    Objective     See Exercise, Manual, and Modality Logs for complete treatment.     Patient Education: continue current HEP.   Add standing corner pec stretch to HEP    Assessment & Plan     Assessment  Assessment details: Patient has continued tightness in bilateral upper traps and levator scapulae.  She responds well to manual therapy and had a reduction in tightness after addition of corner pec stretch.           Progress per Plan of Care and Progress strengthening /stabilization /functional activity            Timed:         Manual Therapy:    30     mins  54667;     Therapeutic Exercise:    20     mins  19712;     Neuromuscular Micky:        mins  08180;    Therapeutic Activity:          mins  57681;     Gait Training:           mins  39861;     Ultrasound:          mins  97503;    Ionto                                   mins   08811  Self Care                            mins   46755  Canalith Repos                   mins  58313    Un-Timed:  Electrical Stimulation:         mins  64773 (MC );  Dry Needling          mins self-pay  Traction          mins 50173  Low Eval          Mins  82220  Mod Eval          Mins  05335  High Eval                            Mins  49773  Re-Eval                               mins  14669    Timed Treatment:   50   mins   Total Treatment:     50   mins    Katy Pina PT  Physical Therapist

## 2020-08-24 ENCOUNTER — TELEPHONE (OUTPATIENT)
Dept: NEUROSURGERY | Facility: CLINIC | Age: 40
End: 2020-08-24

## 2020-08-24 RX ORDER — BACLOFEN 10 MG/1
10 TABLET ORAL 3 TIMES DAILY
Qty: 60 TABLET | Refills: 3 | Status: SHIPPED | OUTPATIENT
Start: 2020-08-24 | End: 2020-11-20

## 2020-08-24 NOTE — TELEPHONE ENCOUNTER
Patient informed Baclofen sent to her pharmacy and she is scheduled for televisit for tomorrow at 2:45 pm

## 2020-08-24 NOTE — TELEPHONE ENCOUNTER
Go ahead and give her baclofen 10 mg p.o. 3 times daily as needed, #60 with 3 refills.  Wanted to schedule a tele-visit with me so I can discuss the tightness feeling with her.       no

## 2020-08-24 NOTE — TELEPHONE ENCOUNTER
Pt called stating that she is having some tightness feeling in her throat and which is causing a some difficulty to swallow, pt is one month post op from surgery (ACDF), and she wanted to know if there's anything she should do? I asked the patient if she is still doing her physical therapy, and she stated yes. I advised to gently massage her throat thru out the day as well as her exercises from physical therapy. Pt states that she understands, she also wants to know if she could a Rx for the Baclofen, for this helps her thru -out the day verses the Flexeril. Please call and advise

## 2020-08-24 NOTE — PROGRESS NOTES
Subjective   History of Present Illness: Jacqueline Coronel is a 40 y.o. female for televisit post op appt.  Patient had surgery 7.23.20, C6/7 ACDF    Patient was last seen 8.10.20 and was having some difficulty swallowing, right arm weakness and N/T right hand and fingers    Patient contacted office 8.24.20 stating that she was having tightness in her throat.  She has returned to work and is working part time from home.  Her Flexeril was changed to Baclofen due to sedation.      Patient states that Baclofen has helped better than Flexeril.  She said that she is having tightness in her throat , worse at the end of the day.  There is no outward visible swelling. Her incision looks fine, no redness or drainage.  She has to be careful with what she eats, but denies choking.    She is not taking any pain meds    You have chosen to receive care through a telephone visit. Do you consent to use a telephone visit for your medical care today? Yes        History of Present Illness    The following portions of the patient's history were reviewed and updated as appropriate: allergies, current medications, past family history, past medical history, past social history, past surgical history and problem list.    Review of Systems    It is been about a month since her surgery.  2 weeks since have seen her.  Since then she has had a little delayed sense of tightness in her throat with some weakening of her voice.  She does not have any problems breathing and she can swallow.  She send us a picture and the incision looks fine there is no signs of infection.  I think this just may be delayed swelling of the recurrent laryngeal nerve.  She said the arm is feeling fine.  We will try a Medrol dose pack and do a follow-up tele-visit in 1 week.  I reassured her I thought this will get better.    Objective             Physical Exam   Deferred  Neurologic Exam   Deferred    Assessment/Plan   Independent Review of Radiographic Studies:      I  personally reviewed the images from the following studies.          Medical Decision Making:      We will try a Medrol Dosepak and I think that will help the sense of tightness and dysphonia.  We will do a tele-visit in 1 week.    Jacqueline was seen today for post-op.    Diagnoses and all orders for this visit:    Postoperative visit    Other orders  -     methylPREDNISolone (MEDROL) 4 MG dose pack; Take as directed on package instructions.      Return in about 1 week (around 9/1/2020) for Tele-visit.

## 2020-08-24 NOTE — TELEPHONE ENCOUNTER
Please advise about the throat tightness and changing her Flexeril to Baclofen.  The Flexeril is too sedating

## 2020-08-25 ENCOUNTER — OFFICE VISIT (OUTPATIENT)
Dept: NEUROSURGERY | Facility: CLINIC | Age: 40
End: 2020-08-25

## 2020-08-25 ENCOUNTER — TELEPHONE (OUTPATIENT)
Dept: NEUROSURGERY | Facility: CLINIC | Age: 40
End: 2020-08-25

## 2020-08-25 DIAGNOSIS — Z48.89 POSTOPERATIVE VISIT: Primary | ICD-10-CM

## 2020-08-25 PROCEDURE — 99024 POSTOP FOLLOW-UP VISIT: CPT | Performed by: NEUROLOGICAL SURGERY

## 2020-08-25 RX ORDER — METHYLPREDNISOLONE 4 MG/1
TABLET ORAL
Qty: 21 TABLET | Refills: 0 | Status: SHIPPED | OUTPATIENT
Start: 2020-08-25 | End: 2021-01-08

## 2020-08-26 ENCOUNTER — TELEPHONE (OUTPATIENT)
Dept: NEUROSURGERY | Facility: CLINIC | Age: 40
End: 2020-08-26

## 2020-08-26 NOTE — TELEPHONE ENCOUNTER
Pt was told after her surgery to only take Tylenol.  Can she take Ibuprofen now?  [phone],  C6/7 ACCDF on 7/23/20.

## 2020-08-27 ENCOUNTER — HOSPITAL ENCOUNTER (OUTPATIENT)
Dept: GENERAL RADIOLOGY | Facility: HOSPITAL | Age: 40
Discharge: HOME | End: 2020-08-27
Admitting: NEUROLOGICAL SURGERY

## 2020-08-27 DIAGNOSIS — Z48.89 POSTOPERATIVE VISIT: ICD-10-CM

## 2020-08-27 PROCEDURE — 72050 X-RAY EXAM NECK SPINE 4/5VWS: CPT

## 2020-08-28 ENCOUNTER — TREATMENT (OUTPATIENT)
Dept: PHYSICAL THERAPY | Facility: CLINIC | Age: 40
End: 2020-08-28

## 2020-08-28 DIAGNOSIS — R29.898 RIGHT ARM WEAKNESS: ICD-10-CM

## 2020-08-28 DIAGNOSIS — Z48.89 POSTOPERATIVE VISIT: ICD-10-CM

## 2020-08-28 DIAGNOSIS — M50.123 CERVICAL DISC DISORDER AT C6-C7 LEVEL WITH RADICULOPATHY: Primary | ICD-10-CM

## 2020-08-28 PROCEDURE — 97110 THERAPEUTIC EXERCISES: CPT | Performed by: PHYSICAL THERAPIST

## 2020-08-28 PROCEDURE — 97014 ELECTRIC STIMULATION THERAPY: CPT | Performed by: PHYSICAL THERAPIST

## 2020-08-28 PROCEDURE — 97140 MANUAL THERAPY 1/> REGIONS: CPT | Performed by: PHYSICAL THERAPIST

## 2020-08-28 NOTE — PROGRESS NOTES
Physical Therapy Daily Progress Note    VISIT#: 4    Subjective   Jacqueline Coronel reports that she has had some increased difficulty since returning to work this week.  She didn't realize how much she needed to move her head and neck at work. She called her surgeon and had an x-ray to check since she was having increased difficulty swallowing.   She states that she took yesterday off to rest.   She also has some increased personal stress at home.   Pain Rating (0-10): 3    Objective     See Exercise, Manual, and Modality Logs for complete treatment.     Patient Education: Instructed on electrode placement for TENS unit if patient tries to use at home.      Assessment & Plan     Assessment  Assessment details: Patient responded well to focus on manual therapy and addition of e-stim and ice post treatment for pain reduction.           Progress per Plan of Care and Progress strengthening /stabilization /functional activity            Timed:         Manual Therapy:    30     mins  49595;     Therapeutic Exercise:    15     mins  00362;     Neuromuscular Micky:        mins  83568;    Therapeutic Activity:          mins  80161;     Gait Training:           mins  57372;     Ultrasound:          mins  10593;    Ionto                                   mins   01478  Self Care                            mins   75776  Canalith Repos                   mins  53815    Un-Timed:  Electrical Stimulation:    15     mins  24014 ( );  Dry Needling          mins self-pay  Traction          mins 26105  Low Eval          Mins  95345  Mod Eval          Mins  02578  High Eval                            Mins  93854  Re-Eval                               mins  64743    Timed Treatment:   45   mins   Total Treatment:     60   mins    Katy Pina PT  Physical Therapist

## 2020-09-01 ENCOUNTER — OFFICE VISIT (OUTPATIENT)
Dept: NEUROSURGERY | Facility: CLINIC | Age: 40
End: 2020-09-01

## 2020-09-01 DIAGNOSIS — Z48.89 POSTOPERATIVE VISIT: Primary | ICD-10-CM

## 2020-09-01 PROCEDURE — 99024 POSTOP FOLLOW-UP VISIT: CPT | Performed by: NEUROLOGICAL SURGERY

## 2020-09-04 ENCOUNTER — TREATMENT (OUTPATIENT)
Dept: PHYSICAL THERAPY | Facility: CLINIC | Age: 40
End: 2020-09-04

## 2020-09-04 DIAGNOSIS — M50.123 CERVICAL DISC DISORDER AT C6-C7 LEVEL WITH RADICULOPATHY: Primary | ICD-10-CM

## 2020-09-04 DIAGNOSIS — R29.898 RIGHT ARM WEAKNESS: ICD-10-CM

## 2020-09-04 PROCEDURE — 97014 ELECTRIC STIMULATION THERAPY: CPT | Performed by: PHYSICAL THERAPIST

## 2020-09-04 PROCEDURE — 97140 MANUAL THERAPY 1/> REGIONS: CPT | Performed by: PHYSICAL THERAPIST

## 2020-09-04 PROCEDURE — 97110 THERAPEUTIC EXERCISES: CPT | Performed by: PHYSICAL THERAPIST

## 2020-09-04 NOTE — PROGRESS NOTES
Physical Therapy Daily Progress Note    VISIT#: 5    Subjective   Jacqueline Coronel reports that she has been under more stress this week.  She is still having trouble swallowing at times but her surgeon felt that was to be expected as normal healing.  She states that she almost fell tripping over her cat and it stirred up her pain and headache.  She states that she is getting burning in her R index finger.    Pain Rating (0-10): 4    Objective     See Exercise, Manual, and Modality Logs for complete treatment.     Patient Education: Continue gentle stretches and scar mobilization.  Updated HEP to include nerve glides    Assessment & Plan     Assessment  Assessment details: Patient reported a reduction in pain to 2/10 after treatment.  She continues with moderate tightness throughout cervical region and nerve tension on the R.       Progress per Plan of Care and Progress strengthening /stabilization /functional activity            Timed:         Manual Therapy:    35     mins  03440;     Therapeutic Exercise:    15     mins  07542;     Neuromuscular Micky:        mins  52638;    Therapeutic Activity:          mins  59409;     Gait Training:           mins  50636;     Ultrasound:          mins  95120;    Ionto                                   mins   73175  Self Care                            mins   22515  Canalith Repos                   mins  45727    Un-Timed:  Electrical Stimulation:    15     mins  69966 ( );  Dry Needling          mins self-pay  Traction          mins 05763  Low Eval          Mins  51059  Mod Eval          Mins  51396  High Eval                            Mins  15330  Re-Eval                               mins  25342    Timed Treatment:   50   mins   Total Treatment:     65   mins    Katy Pina PT  Physical Therapist

## 2020-09-08 ENCOUNTER — TELEPHONE (OUTPATIENT)
Dept: PHYSICAL THERAPY | Facility: CLINIC | Age: 40
End: 2020-09-08

## 2020-09-15 ENCOUNTER — TREATMENT (OUTPATIENT)
Dept: PHYSICAL THERAPY | Facility: CLINIC | Age: 40
End: 2020-09-15

## 2020-09-15 DIAGNOSIS — Z48.89 POSTOPERATIVE VISIT: ICD-10-CM

## 2020-09-15 DIAGNOSIS — R29.898 RIGHT ARM WEAKNESS: ICD-10-CM

## 2020-09-15 DIAGNOSIS — M50.123 CERVICAL DISC DISORDER AT C6-C7 LEVEL WITH RADICULOPATHY: Primary | ICD-10-CM

## 2020-09-15 PROCEDURE — 97110 THERAPEUTIC EXERCISES: CPT | Performed by: PHYSICAL THERAPIST

## 2020-09-15 PROCEDURE — 97014 ELECTRIC STIMULATION THERAPY: CPT | Performed by: PHYSICAL THERAPIST

## 2020-09-15 PROCEDURE — 97140 MANUAL THERAPY 1/> REGIONS: CPT | Performed by: PHYSICAL THERAPIST

## 2020-09-15 NOTE — PROGRESS NOTES
Physical Therapy Daily Progress Note    VISIT#: 6    Subjective   Jacqueline Coronel reports that she has a really deep ache in the base of her c-spine.  She feels like the nerve glides are helping and she is noticing an improvement in her mobility and pain.  She is having less anterior throat pain.  Pain Rating (0-10): 4    Objective     See Exercise, Manual, and Modality Logs for complete treatment.     Patient Education: Continue current HEP    Assessment & Plan     Assessment  Assessment details: Patient had significantly less tightness in bilateral upper traps and less restrictions in her anterior cervical scar.  She is reporting decreased pain overall.       Progress per Plan of Care and Progress strengthening /stabilization /functional activity            Timed:         Manual Therapy:    30     mins  54222;     Therapeutic Exercise:    15     mins  55877;     Neuromuscular Micky:        mins  58329;    Therapeutic Activity:          mins  95001;     Gait Training:           mins  95646;     Ultrasound:          mins  91957;    Ionto                                   mins   39654  Self Care                            mins   36339  Canalith Repos                   mins  44669    Un-Timed:  Electrical Stimulation:    15     mins  22850 ( );  Dry Needling          mins self-pay  Traction          mins 99746  Low Eval          Mins  38435  Mod Eval          Mins  99681  High Eval                            Mins  38629  Re-Eval                               mins  74772    Timed Treatment:   45   mins   Total Treatment:     60   mins    Katy Pina PT  Physical Therapist

## 2020-09-18 ENCOUNTER — TREATMENT (OUTPATIENT)
Dept: PHYSICAL THERAPY | Facility: CLINIC | Age: 40
End: 2020-09-18

## 2020-09-18 DIAGNOSIS — Z48.89 POSTOPERATIVE VISIT: ICD-10-CM

## 2020-09-18 DIAGNOSIS — R29.898 RIGHT ARM WEAKNESS: ICD-10-CM

## 2020-09-18 DIAGNOSIS — M50.123 CERVICAL DISC DISORDER AT C6-C7 LEVEL WITH RADICULOPATHY: Primary | ICD-10-CM

## 2020-09-18 PROCEDURE — 97014 ELECTRIC STIMULATION THERAPY: CPT | Performed by: PHYSICAL THERAPIST

## 2020-09-18 PROCEDURE — 97140 MANUAL THERAPY 1/> REGIONS: CPT | Performed by: PHYSICAL THERAPIST

## 2020-09-18 PROCEDURE — 97110 THERAPEUTIC EXERCISES: CPT | Performed by: PHYSICAL THERAPIST

## 2020-09-18 NOTE — PROGRESS NOTES
Physical Therapy Daily Progress Note    VISIT#: 7    Subjective   Jacqueline Coronel reports that she is feeling better overall but she has a deep ache at her surgical level and pain extending down into her upper thoracic spine.    Pain Rating (0-10): 4    Objective     See Exercise, Manual, and Modality Logs for complete treatment.     Patient Education: Continue gentle stretching at home    Assessment & Plan     Assessment  Assessment details: Patient had increased tightness in R pectorals and anterior neck.  She responded well to manual therapy and is reporting reduced pain overall.           Progress per Plan of Care and Progress strengthening /stabilization /functional activity            Timed:         Manual Therapy:    35     mins  07028;     Therapeutic Exercise:    15     mins  96541;     Neuromuscular Micky:        mins  96682;    Therapeutic Activity:          mins  56141;     Gait Training:           mins  74816;     Ultrasound:          mins  06287;    Ionto                                   mins   09850  Self Care                            mins   52598  Canalith Repos                   mins  80700    Un-Timed:  Electrical Stimulation:    15     mins  12547 ( );  Dry Needling          mins self-pay  Traction          mins 28217  Low Eval          Mins  75768  Mod Eval          Mins  98439  High Eval                            Mins  27455  Re-Eval                               mins  24727    Timed Treatment:   50   mins   Total Treatment:     65   mins    Katy Pina PT  Physical Therapist

## 2020-09-23 ENCOUNTER — TREATMENT (OUTPATIENT)
Dept: PHYSICAL THERAPY | Facility: CLINIC | Age: 40
End: 2020-09-23

## 2020-09-23 DIAGNOSIS — Z48.89 POSTOPERATIVE VISIT: ICD-10-CM

## 2020-09-23 DIAGNOSIS — M50.123 CERVICAL DISC DISORDER AT C6-C7 LEVEL WITH RADICULOPATHY: Primary | ICD-10-CM

## 2020-09-23 DIAGNOSIS — R29.898 RIGHT ARM WEAKNESS: ICD-10-CM

## 2020-09-23 PROCEDURE — 97140 MANUAL THERAPY 1/> REGIONS: CPT | Performed by: PHYSICAL THERAPIST

## 2020-09-23 PROCEDURE — 97014 ELECTRIC STIMULATION THERAPY: CPT | Performed by: PHYSICAL THERAPIST

## 2020-09-23 NOTE — PROGRESS NOTES
Physical Therapy Daily Progress Note    VISIT#: 8    Subjective   Jacqueline Coronel reports that she is really concerned about her neck.  She has had increased redness at her lateral incision, increased neck pain, and increased symptoms into R index finger.  She states that she feels like someone is squeezing her R index finger.  Pain Rating (0-10): 6    Objective     See Exercise, Manual, and Modality Logs for complete treatment.     Patient Education: Contact MD regarding current symptoms    Assessment & Plan     Assessment  Assessment details: Patient had moderate increase in tightness in R upper trap.  She presented with elevated R shoulder and increased postural guarding today.  Focus was on manual therapy and modalities for pain reduction.           Progress per Plan of Care and Progress strengthening /stabilization /functional activity            Timed:         Manual Therapy:    35    mins  89639;     Therapeutic Exercise:         mins  46692;     Neuromuscular Micky:        mins  66121;    Therapeutic Activity:          mins  52965;     Gait Training:           mins  57668;     Ultrasound:          mins  35202;    Ionto                                   mins   45353  Self Care                            mins   28452  Canalith Repos                   mins  65466    Un-Timed:  Electrical Stimulation:    15     mins  96468 ( );  Dry Needling          mins self-pay  Traction          mins 84980  Low Eval          Mins  17152  Mod Eval          Mins  48033  High Eval                            Mins  18973  Re-Eval                               mins  17555    Timed Treatment:   35  mins   Total Treatment:     50   mins    Katy Pina PT  Physical Therapist

## 2020-09-25 ENCOUNTER — TREATMENT (OUTPATIENT)
Dept: PHYSICAL THERAPY | Facility: CLINIC | Age: 40
End: 2020-09-25

## 2020-09-25 DIAGNOSIS — M50.123 CERVICAL DISC DISORDER AT C6-C7 LEVEL WITH RADICULOPATHY: Primary | ICD-10-CM

## 2020-09-25 DIAGNOSIS — R29.898 RIGHT ARM WEAKNESS: ICD-10-CM

## 2020-09-25 DIAGNOSIS — Z48.89 POSTOPERATIVE VISIT: ICD-10-CM

## 2020-09-25 PROCEDURE — 97110 THERAPEUTIC EXERCISES: CPT | Performed by: PHYSICAL THERAPIST

## 2020-09-25 PROCEDURE — 97014 ELECTRIC STIMULATION THERAPY: CPT | Performed by: PHYSICAL THERAPIST

## 2020-09-25 PROCEDURE — 97140 MANUAL THERAPY 1/> REGIONS: CPT | Performed by: PHYSICAL THERAPIST

## 2020-09-25 NOTE — PROGRESS NOTES
Physical Therapy Daily Progress Note    VISIT#: 9    Subjective   Jacqueline Coronel reports that she has tried to remind herself that she is only 9 weeks post-op and she is still healing.  She is mainly having pain in her scapular region and the tightness into her hand.  She states that she will be more concerned if it returned in her elbow.    Objective     See Exercise, Manual, and Modality Logs for complete treatment.     Patient Education: Continue gentle ROM at home    Assessment & Plan     Assessment  Assessment details: Patient had less tightness in bilateral upper traps but continues with increased R>L pectoral tightness.          Progress per Plan of Care and Progress strengthening /stabilization /functional activity            Timed:         Manual Therapy:    35     mins  98349;     Therapeutic Exercise:    10     mins  35445;     Neuromuscular Micky:        mins  10204;    Therapeutic Activity:          mins  88956;     Gait Training:           mins  41672;     Ultrasound:          mins  57777;    Ionto                                   mins   88464  Self Care                            mins   51954  Canalith Repos                   mins  49237    Un-Timed:  Electrical Stimulation:    15     mins  76523 ( );  Dry Needling          mins self-pay  Traction          mins 54793  Low Eval          Mins  98629  Mod Eval          Mins  81617  High Eval                            Mins  64828  Re-Eval                               mins  39717    Timed Treatment:   45   mins   Total Treatment:     60   mins    Katy Pina PT  Physical Therapist

## 2020-09-30 ENCOUNTER — TREATMENT (OUTPATIENT)
Dept: PHYSICAL THERAPY | Facility: CLINIC | Age: 40
End: 2020-09-30

## 2020-09-30 DIAGNOSIS — R29.898 RIGHT ARM WEAKNESS: ICD-10-CM

## 2020-09-30 DIAGNOSIS — M50.123 CERVICAL DISC DISORDER AT C6-C7 LEVEL WITH RADICULOPATHY: Primary | ICD-10-CM

## 2020-09-30 PROCEDURE — 97014 ELECTRIC STIMULATION THERAPY: CPT | Performed by: PHYSICAL THERAPIST

## 2020-09-30 PROCEDURE — 97140 MANUAL THERAPY 1/> REGIONS: CPT | Performed by: PHYSICAL THERAPIST

## 2020-09-30 NOTE — PROGRESS NOTES
Physical Therapy Daily Progress Note    VISIT#: 10    Subjective    Jacqueline Coronel reports that she still has loss of sensation in her R index and middle finger.  She states that she gets 'zingers' in her R scapula.  She hasn't called her surgeon because she is trying to remember that she is still healing.    Objective     See Exercise, Manual, and Modality Logs for complete treatment.     Patient Education: Contact MD if symptoms persist and for reassurance    Assessment & Plan     Assessment  Assessment details: Patient presents with very guarded, stiff posture but has decreased muscle tightness throughout cervical region.  She responds well to manual therapy but has continued radicular symptoms.           Progress per Plan of Care and Progress strengthening /stabilization /functional activity            Timed:         Manual Therapy:    35     mins  54944;     Therapeutic Exercise:         mins  91048;     Neuromuscular Micky:        mins  22310;    Therapeutic Activity:          mins  03213;     Gait Training:           mins  31838;     Ultrasound:          mins  49932;    Ionto                                   mins   34714  Self Care                            mins   34775  Canalith Repos                   mins  79783    Un-Timed:  Electrical Stimulation:    15     mins  19272 ( );  Dry Needling          mins self-pay  Traction          mins 82814  Low Eval          Mins  91714  Mod Eval          Mins  94781  High Eval                            Mins  33373  Re-Eval                               mins  65704    Timed Treatment:   35   mins   Total Treatment:     50   mins    Katy Pina PT  Physical Therapist

## 2020-10-02 ENCOUNTER — TREATMENT (OUTPATIENT)
Dept: PHYSICAL THERAPY | Facility: CLINIC | Age: 40
End: 2020-10-02

## 2020-10-02 ENCOUNTER — TELEPHONE (OUTPATIENT)
Dept: NEUROSURGERY | Facility: CLINIC | Age: 40
End: 2020-10-02

## 2020-10-02 ENCOUNTER — RESULTS ENCOUNTER (OUTPATIENT)
Dept: ONCOLOGY | Facility: CLINIC | Age: 40
End: 2020-10-02

## 2020-10-02 DIAGNOSIS — M50.123 CERVICAL DISC DISORDER AT C6-C7 LEVEL WITH RADICULOPATHY: Primary | ICD-10-CM

## 2020-10-02 DIAGNOSIS — E83.110 HEREDITARY HEMOCHROMATOSIS (HCC): ICD-10-CM

## 2020-10-02 DIAGNOSIS — R29.898 RIGHT ARM WEAKNESS: ICD-10-CM

## 2020-10-02 DIAGNOSIS — Z48.89 POSTOPERATIVE VISIT: Primary | ICD-10-CM

## 2020-10-02 PROCEDURE — 97140 MANUAL THERAPY 1/> REGIONS: CPT | Performed by: PHYSICAL THERAPIST

## 2020-10-02 PROCEDURE — 97014 ELECTRIC STIMULATION THERAPY: CPT | Performed by: PHYSICAL THERAPIST

## 2020-10-02 NOTE — PROGRESS NOTES
Physical Therapy Daily Progress Note    VISIT#: 11    Subjective   Jacqueline Coronel reports that she has noticed that her lower cervical pain is closer to the insertion of her R levator scapulae.    Pain Rating (0-10): 5    Objective     See Exercise, Manual, and Modality Logs for complete treatment.     Patient Education: Educated on levator scapulae anatomy and stretching    Assessment & Plan     Assessment  Assessment details: Patient presented with elevated R shoulder and increased tenderness at right levator scapulae.  She responded well to focus of manual therapy on right levator scapulae and addition of gentle levator scapulae stretching.       Progress per Plan of Care and Progress strengthening /stabilization /functional activity            Timed:         Manual Therapy:    30     mins  28672;     Therapeutic Exercise:    5     mins  98006;     Neuromuscular Micky:        mins  38844;    Therapeutic Activity:          mins  58304;     Gait Training:           mins  19013;     Ultrasound:          mins  01216;    Ionto                                   mins   07083  Self Care                            mins   15245  Canalith Repos                   mins  19926    Un-Timed:  Electrical Stimulation:    15     mins  55373 ( );  Dry Needling          mins self-pay  Traction          mins 72507  Low Eval          Mins  26847  Mod Eval          Mins  73714  High Eval                            Mins  16477  Re-Eval                               mins  33162    Timed Treatment:   35   mins   Total Treatment:     50   mins    Katy Pina PT  Physical Therapist

## 2020-10-02 NOTE — TELEPHONE ENCOUNTER
Called pt about appointment scheduled for 10/19/20 with Dr. Muñoz. It will need to be changed from in-person to telephone visit if patient if ok with it.

## 2020-10-05 ENCOUNTER — TREATMENT (OUTPATIENT)
Dept: PHYSICAL THERAPY | Facility: CLINIC | Age: 40
End: 2020-10-05

## 2020-10-05 DIAGNOSIS — M50.123 CERVICAL DISC DISORDER AT C6-C7 LEVEL WITH RADICULOPATHY: Primary | ICD-10-CM

## 2020-10-05 DIAGNOSIS — R29.898 RIGHT ARM WEAKNESS: ICD-10-CM

## 2020-10-05 PROCEDURE — 97140 MANUAL THERAPY 1/> REGIONS: CPT | Performed by: PHYSICAL THERAPIST

## 2020-10-05 PROCEDURE — 97014 ELECTRIC STIMULATION THERAPY: CPT | Performed by: PHYSICAL THERAPIST

## 2020-10-05 NOTE — PROGRESS NOTES
Re-Assessment / Re-Certification        Patient: Jacqueline Coronel   : 1980  Diagnosis/ICD-10 Code:  Cervical disc disorder at C6-C7 level with radiculopathy [M50.123]  Referring practitioner: Shar Muñoz MD  Date of Initial Visit: Type: THERAPY  Noted: 2020  Today's Date: 10/5/2020  Patient seen for 12 sessions      Subjective:   Jacqueline Coronel reports that she hasn't been sleeping well the past few days.  She is trying to be understanding the she is still fairly acutely post-op and that it is normal to still have residual numbness in her R hand and weakness in her arms.  She states that it takes more intention and focus to even reach across her body to wash the other side while gripping a washcloth.  She is scheduled to follow-up with her surgeon on .  Subjective Questionnaire: NDI:  Clinical Progress: improved  Home Program Compliance: Yes  Treatment has included: therapeutic exercise, manual therapy, electrical stimulation, moist heat and cryotherapy    Subjective Evaluation    History of Present Illness  Date of surgery: 2020    Pain  Current pain ratin  At best pain rating: 3  At worst pain ratin  Location: lower c-spine   Aggravating factors: movement, prolonged positioning, sleeping and repetitive movement         Objective          Palpation   Left   Hypertonic in the levator scapulae, pectoralis minor and upper trapezius.   Tenderness of the levator scapulae and upper trapezius.     Right   Hypertonic in the levator scapulae, pectoralis minor and upper trapezius. Tenderness of the levator scapulae and upper trapezius.     Tenderness   Cervical Spine   Tenderness in the right 1st rib.     Neurological Testing     Sensation   Cervical/Thoracic     Right   Diminished: light touch    Comments   Right light touch: index finger and thumb.     Active Range of Motion   Cervical/Thoracic Spine   Cervical    Flexion: 30 degrees   Extension: 36 degrees   Left lateral  flexion: 30 degrees   Right lateral flexion: 32 degrees   Left rotation: 48 degrees   Right rotation: 50 degrees       Assessment & Plan     Assessment  Impairments: abnormal muscle tone, abnormal or restricted ROM, activity intolerance, impaired physical strength, lacks appropriate home exercise program and pain with function  Assessment details: The patient is a 40 y.o. female who presented to physical therapy for post operation treatment following her ACDF on July 23, 2020 . She has attended 12 visits in outpatient physical therapy to date.  She still demonstrates the following impairments listed above. Due to these impairments, the patient is unable to tolerate sleeping, lifting, prolonged sitting, reaching overhead and behind back. The patient would continue to benefit from skilled PT services to address functional limitations and impairments and to improve patient quality of life.    Prognosis: good  Functional Limitations: carrying objects, lifting, sleeping, uncomfortable because of pain, moving in bed, sitting, reaching behind back, reaching overhead and unable to perform repetitive tasks  Goals  Plan Goals: STG's: 2 weeks   Patient will report a reduction in pain by >25% -MET  Patient will be able to perform HEP with minimal verbal cues  - MET    LTG's: By discharge   Patient will report a reduction in pain by >75% - PARTIALLY MET  Patient will have a >10 point improvement on the Neck Disability Index to demonstrate overall improvement in daily functional level - PARTIALLY MET  Patient will be able to reach into overhead cabinet to get a dish without pain or difficulty - PARTIALLY MET  Patient will be able to tolerate sitting > 60 minutes without increased pain - PARTIALLY MET  Patient will demonstrate sufficient cervical AROM to allow patient to turn her head to view blindspots when driving - PARTIALLY MET  Patient will be able to sleep > 5 hours without waking in pain - PARTIALLY MET  Patient will be  independent with HEP - PARTIALLY MET      Plan  Therapy options: will be seen for skilled physical therapy services  Planned modality interventions: cryotherapy, electrical stimulation/Russian stimulation, TENS and thermotherapy (hydrocollator packs)  Planned therapy interventions: body mechanics training, flexibility, functional ROM exercises, home exercise program, manual therapy, neuromuscular re-education, postural training, soft tissue mobilization, strengthening, stretching and ADL retraining  Duration in visits: 8  Treatment plan discussed with: patient      Progress toward previous goals: Partially Met      Recommendations: Continue as planned  Timeframe: 1 month  Prognosis to achieve goals: good    PT Signature: Katy Pina, ZAFAR      Based upon review of the patient's progress and continued therapy plan, it is my medical opinion that Jacqueline Coronel should continue physical therapy treatment at [facility]  [address] [place] [address] [place] [address] [phone].    Signature: __________________________________  Shar Muñoz MD    Timed:         Manual Therapy:    30     mins  91516;     Therapeutic Exercise:         mins  13025;     Neuromuscular Micky:        mins  46259;    Therapeutic Activity:          mins  08340;     Gait Training:           mins  81325;     Ultrasound:          mins  18799;    Ionto                                   mins   36382  Self Care                            mins   62018  Canalith Repos                  mins   98858    Un-Timed:  Electrical Stimulation:    10     mins  74003 ( );  Dry Needling          mins self-pay  Traction          mins 41625  Low Eval          Mins  43590  Mod Eval          Mins  38973  High Eval                            Mins  32655  Re-Eval                           5    mins  79491      Timed Treatment:   30   mins   Total Treatment:     45   mins

## 2020-10-05 NOTE — TELEPHONE ENCOUNTER
PATIENT CALLED REGARDING THE FOLLOWING APPOINTMENT.  Type: FOLLOW UP  Date: 10/19/20  Provider: DR. WEAVER  Caller: PATIENT  Phone Number: [phone]  Reason: PATIENT RETURNED CALL REGARDING TELEVISIT & STATES SHE PREFERS TO KEEP UPCOMING APPT AS AN IN OFFICE APPT. PATIENT REQUESTING TO BE SEEN SOONER & OKAY TO SEE APRN IF POSSIBLE FOR SOONER APPT. ADDED PATIENT TO WAIT LIST AT THIS TIME. PATIENT MENTIONS COMPLETING AN XR PRIOR TO VISIT BUT NO CURRENT ORDER IN CHART AT TIME OF CALL. NO INDICATION OF XR IN LAST OFFICE VISIT. PLEASE REVIEW & ADVISE HOW PROVIDER WOULD LIKE TO PROCEED?  Availability for callback: ANYTIME  Preferred dates for scheduling: AS SOON AS POSSIBLE    PATIENT CAN BE CONTACTED WITH AN UPDATE

## 2020-10-05 NOTE — TELEPHONE ENCOUNTER
Patient refuses televisit and wants to be seen sooner and with xrays.  Has fu scheduled 10.19.20    Ok to keep televisit or change to face to face and does she need xrays

## 2020-10-06 NOTE — TELEPHONE ENCOUNTER
Lm for patient that we are leaving her appt as a face to face appt, however, we do not have anything sooner

## 2020-10-07 ENCOUNTER — TREATMENT (OUTPATIENT)
Dept: PHYSICAL THERAPY | Facility: CLINIC | Age: 40
End: 2020-10-07

## 2020-10-07 DIAGNOSIS — M50.123 CERVICAL DISC DISORDER AT C6-C7 LEVEL WITH RADICULOPATHY: Primary | ICD-10-CM

## 2020-10-07 DIAGNOSIS — R29.898 RIGHT ARM WEAKNESS: ICD-10-CM

## 2020-10-07 PROCEDURE — 97140 MANUAL THERAPY 1/> REGIONS: CPT | Performed by: PHYSICAL THERAPIST

## 2020-10-07 PROCEDURE — 97014 ELECTRIC STIMULATION THERAPY: CPT | Performed by: PHYSICAL THERAPIST

## 2020-10-07 NOTE — PROGRESS NOTES
Physical Therapy Daily Progress Note    VISIT#: 13    Subjective   Jacqueline Coronel reports patient states that she is having a tough day all over because of marital issues.  Her  is moving into an apartment today and she is having a hard time.  This is contributing to increased muscle tension all over and she just hurts today.  Pain Rating (0-10): 4    Objective     See Exercise, Manual, and Modality Logs for complete treatment.     Patient Education: Continue gentle stretching and relaxation activities.    Assessment & Plan     Assessment  Assessment details: Patient had significant increase in tightness throughout L>R upper trap and levator scapulae today.  She was more guarded with overall ROM and had general increase in overall tension.          Progress per Plan of Care and Progress strengthening /stabilization /functional activity            Timed:         Manual Therapy:    30     mins  31322;     Therapeutic Exercise:         mins  97077;     Neuromuscular Micky:        mins  72786;    Therapeutic Activity:          mins  38306;     Gait Training:           mins  23247;     Ultrasound:          mins  59504;    Ionto                                   mins   97801  Self Care                            mins   12851  Canalith Repos                   mins  98632    Un-Timed:  Electrical Stimulation:    15     mins  08899 ( );  Dry Needling          mins self-pay  Traction          mins 97681  Low Eval          Mins  78814  Mod Eval          Mins  00341  High Eval                            Mins  03227  Re-Eval                               mins  71654    Timed Treatment:   30   mins   Total Treatment:     45   mins    Katy Pina PT  Physical Therapist

## 2020-10-08 NOTE — PROGRESS NOTES
Subjective   History of Present Illness: Jacqueline Coronel is a 40 y.o. female is here today for 6 week follow-up.    Patient was last seen in the office 9/01/2020.    Patient had ACDF C6-C7 on 7/23/2020 by Dr. Muñoz.    Patient states that she has a very deep ache in her spine. She has pain in her right shoulder blade for the past month. She states that she has N/T in her right arm and hand. Patient states that she constantly drops things that she hold in her right hand. She says that she has a burning pain that goes from her right forearm up to her neck.     Patient states that she has tight feeling in her throat. She states that it is sore to swallow.     Patient sates that she tried going to get her xray 10/16/2020 but was told no xrays were put in.    Patient states that she is currently taking Tylenol OTC for pain.     Its been almost 3 months since she had her ACDF at C5-6 C7.  The arm pain is much better but there is some residual symptoms such as a bit of a burning pain and numbness and tingling.  Her strength actually is quite good today.  I think this is all just signs of the nerve healing and will take some more time.  She is so much better than she was before hand.  The swallowing is getting better.  The incision is healing well but it is a little red because of her fair skin and she is using vitamin E on it.  I told her to be patient as it could take a year for the nerve to recover as much as is going to recover and were not even close to that.  Should continue therapy and I will be happy to renew it and I will see her in another 3months with x-rays.  Neck Pain   The current episode started more than 1 year ago. The problem occurs daily. The quality of the pain is described as aching. The pain is at a severity of 3/10. The symptoms are aggravated by sneezing, coughing, swallowing, twisting and position. The pain is worse during the day. Associated symptoms include headaches, numbness, pain with  "swallowing and tingling. Pertinent negatives include no fever, photophobia, trouble swallowing or visual change. She has tried ice and heat for the symptoms. The treatment provided moderate relief.   Arm Pain   The pain is present in the right hand and right shoulder. The quality of the pain is described as burning. The pain radiates to the right arm and right hand. The pain is at a severity of 3/10. The pain is moderate. The pain has been constant since the incident. Associated symptoms include muscle weakness, numbness and tingling. The treatment provided mild relief.       The following portions of the patient's history were reviewed and updated as appropriate: allergies, current medications, past family history, past medical history, past social history, past surgical history and problem list.    Review of Systems   Constitutional: Negative for chills and fever.   HENT: Negative for congestion and trouble swallowing.    Eyes: Negative for photophobia and visual disturbance.   Musculoskeletal: Positive for neck pain and neck stiffness. Negative for back pain and joint swelling.   Neurological: Positive for tingling, numbness and headaches.           Objective     Vitals:    10/19/20 1452   BP: 133/90   Cuff Size: Adult   Pulse: 76   Temp: 96.8 °F (36 °C)   Weight: 68 kg (150 lb)   Height: 170.2 cm (67\")     Body mass index is 23.49 kg/m².      Physical Exam  Constitutional:       Appearance: She is well-developed.   HENT:      Head: Normocephalic and atraumatic.   Eyes:      Extraocular Movements: EOM normal.      Conjunctiva/sclera: Conjunctivae normal.      Pupils: Pupils are equal, round, and reactive to light.      Funduscopic exam:     Right eye: No papilledema.         Left eye: No papilledema.   Neck:      Vascular: No carotid bruit.   Neurological:      Mental Status: She is oriented to person, place, and time.      Coordination: Finger-Nose-Finger Test and Heel to Shin Test normal.      Gait: Gait is " intact.      Deep Tendon Reflexes:      Reflex Scores:       Tricep reflexes are 2+ on the right side and 2+ on the left side.       Bicep reflexes are 2+ on the right side and 2+ on the left side.       Brachioradialis reflexes are 2+ on the right side and 2+ on the left side.       Patellar reflexes are 2+ on the right side and 2+ on the left side.       Achilles reflexes are 2+ on the right side and 2+ on the left side.  Psychiatric:         Speech: Speech normal.       Neurologic Exam     Mental Status   Oriented to person, place, and time.   Registration of memory: Good recent and remote memory.   Attention: normal. Concentration: normal.   Speech: speech is normal   Level of consciousness: alert  Knowledge: consistent with education.     Cranial Nerves     CN II   Visual fields full to confrontation.   Visual acuity: normal    CN III, IV, VI   Pupils are equal, round, and reactive to light.  Extraocular motions are normal.     CN V   Facial sensation intact.   Right corneal reflex: normal  Left corneal reflex: normal    CN VII   Facial expression full, symmetric.   Right facial weakness: none  Left facial weakness: none    CN VIII   Hearing: intact    CN IX, X   Palate: symmetric    CN XI   Right sternocleidomastoid strength: normal  Left sternocleidomastoid strength: normal    CN XII   Tongue: not atrophic  Tongue deviation: none    Motor Exam   Muscle bulk: normal  Right arm tone: normal  Left arm tone: normal  Right leg tone: normal  Left leg tone: normal    Strength   Strength 5/5 except as noted.     Sensory Exam   Light touch normal.     Gait, Coordination, and Reflexes     Gait  Gait: normal    Coordination   Finger to nose coordination: normal  Heel to shin coordination: normal    Reflexes   Right brachioradialis: 2+  Left brachioradialis: 2+  Right biceps: 2+  Left biceps: 2+  Right triceps: 2+  Left triceps: 2+  Right patellar: 2+  Left patellar: 2+  Right achilles: 2+  Left achilles: 2+  Right :  2+  Left : 2+          Assessment/Plan   Independent Review of Radiographic Studies:      I personally reviewed the images from the following studies.        Medical Decision Making:      Overall, making slow progress.  She will continue her physical therapy and and I will be happy to renew it when the time comes.  We will see her in 3 months with x-rays.  As I reminded her it can take up to year for the nerves to fully heal after surgery of this nature.      Diagnoses and all orders for this visit:    1. Postoperative visit (Primary)  -     XR spine cervical ap and lat w flex and ext; Future      Return in about 3 months (around 1/19/2021) for Face-to-face.

## 2020-10-09 ENCOUNTER — APPOINTMENT (OUTPATIENT)
Dept: ONCOLOGY | Facility: HOSPITAL | Age: 40
End: 2020-10-09

## 2020-10-09 ENCOUNTER — APPOINTMENT (OUTPATIENT)
Dept: OTHER | Facility: HOSPITAL | Age: 40
End: 2020-10-09

## 2020-10-13 ENCOUNTER — TREATMENT (OUTPATIENT)
Dept: PHYSICAL THERAPY | Facility: CLINIC | Age: 40
End: 2020-10-13

## 2020-10-13 DIAGNOSIS — R29.898 RIGHT ARM WEAKNESS: ICD-10-CM

## 2020-10-13 DIAGNOSIS — M50.123 CERVICAL DISC DISORDER AT C6-C7 LEVEL WITH RADICULOPATHY: Primary | ICD-10-CM

## 2020-10-13 PROCEDURE — 97140 MANUAL THERAPY 1/> REGIONS: CPT | Performed by: PHYSICAL THERAPIST

## 2020-10-13 PROCEDURE — 97014 ELECTRIC STIMULATION THERAPY: CPT | Performed by: PHYSICAL THERAPIST

## 2020-10-13 NOTE — PROGRESS NOTES
Physical Therapy Daily Progress Note    VISIT#: 14    Subjective   Jacqueline Coronel reports that she still has pain in her scapular region and into her hand but she doesn't feel like her symptoms are worsening.  She feels like she has met a plateau in her symptoms where they aren't getting worse.   She is pleased that her scar is healing so well.  She has not been sleeping well but has more stress at home.    Objective     See Exercise, Manual, and Modality Logs for complete treatment.     Patient Education: Continue current HEP    Assessment & Plan     Assessment  Assessment details: Patient is responding well to treatment focus on manual therapy and modalities.  She is working on her stretches at home.   She continues with post-op tightness and radicular pain.          Progress per Plan of Care and Progress strengthening /stabilization /functional activity.  Patient returns to MD next week             Timed:         Manual Therapy:    35     mins  05666;     Therapeutic Exercise:         mins  89362;     Neuromuscular Micky:        mins  69715;    Therapeutic Activity:          mins  87505;     Gait Training:           mins  19204;     Ultrasound:          mins  07395;    Ionto                                   mins   17206  Self Care                            mins   00821  Canalith Repos                   mins  87220    Un-Timed:  Electrical Stimulation:    15    mins  62094 ( );  Dry Needling          mins self-pay  Traction          mins 93602  Low Eval          Mins  90760  Mod Eval          Mins  19903  High Eval                            Mins  16597  Re-Eval                               mins  83897    Timed Treatment:   35   mins   Total Treatment:     50   mins    Katy Pina PT  Physical Therapist

## 2020-10-15 ENCOUNTER — TREATMENT (OUTPATIENT)
Dept: PHYSICAL THERAPY | Facility: CLINIC | Age: 40
End: 2020-10-15

## 2020-10-15 DIAGNOSIS — M50.123 CERVICAL DISC DISORDER AT C6-C7 LEVEL WITH RADICULOPATHY: Primary | ICD-10-CM

## 2020-10-15 DIAGNOSIS — R29.898 RIGHT ARM WEAKNESS: ICD-10-CM

## 2020-10-15 PROCEDURE — 97014 ELECTRIC STIMULATION THERAPY: CPT | Performed by: PHYSICAL THERAPIST

## 2020-10-15 PROCEDURE — 97140 MANUAL THERAPY 1/> REGIONS: CPT | Performed by: PHYSICAL THERAPIST

## 2020-10-15 NOTE — PROGRESS NOTES
Physical Therapy Daily Progress Note    VISIT#: 15    Subjective   Jacqueline Coronel reports that she twisted wrong to open her door and now her neck feels stiff.  She has been trying to increase her stretches to regain her motion.  Pain Rating (0-10): 4    Objective     See Exercise, Manual, and Modality Logs for complete treatment.     Patient Education: Continue current HEP     Assessment & Plan     Assessment  Assessment details: Even though patient perceived tightness in her neck, she demonstrated a reduction in tightness throughout cervical region bilaterally.            Progress per Plan of Care and Progress strengthening /stabilization /functional activity            Timed:         Manual Therapy:    35     mins  67585;     Therapeutic Exercise:         mins  21441;     Neuromuscular Micky:        mins  97807;    Therapeutic Activity:          mins  07568;     Gait Training:           mins  89048;     Ultrasound:          mins  46534;    Ionto                                   mins   62841  Self Care                            mins   71024  Canalith Repos                   mins  47469    Un-Timed:  Electrical Stimulation:    15     mins  11477 ( );  Dry Needling          mins self-pay  Traction          mins 18626  Low Eval          Mins  28608  Mod Eval          Mins  75501  High Eval                            Mins  00276  Re-Eval                               mins  69292    Timed Treatment:   35   mins   Total Treatment:     50   mins    Katy Pina PT  Physical Therapist

## 2020-10-16 NOTE — TELEPHONE ENCOUNTER
[facility] registration called regarding patient. Patient went to get xray's done that she needed prior to visit with Dr. Muñoz. Please call patient once Dr. Muñoz signs off on them so that patient can get them done. Thanks.

## 2020-10-16 NOTE — TELEPHONE ENCOUNTER
LM for patient letting her know that the order for the xrays are in her chart and Ryan Glynn can access them

## 2020-10-19 ENCOUNTER — OFFICE VISIT (OUTPATIENT)
Dept: NEUROSURGERY | Facility: CLINIC | Age: 40
End: 2020-10-19

## 2020-10-19 VITALS
HEART RATE: 76 BPM | BODY MASS INDEX: 23.54 KG/M2 | TEMPERATURE: 96.8 F | WEIGHT: 150 LBS | DIASTOLIC BLOOD PRESSURE: 90 MMHG | HEIGHT: 67 IN | SYSTOLIC BLOOD PRESSURE: 133 MMHG

## 2020-10-19 DIAGNOSIS — Z48.89 POSTOPERATIVE VISIT: Primary | ICD-10-CM

## 2020-10-19 PROCEDURE — 99024 POSTOP FOLLOW-UP VISIT: CPT | Performed by: NEUROLOGICAL SURGERY

## 2020-10-21 ENCOUNTER — TREATMENT (OUTPATIENT)
Dept: PHYSICAL THERAPY | Facility: CLINIC | Age: 40
End: 2020-10-21

## 2020-10-21 DIAGNOSIS — R29.898 RIGHT ARM WEAKNESS: ICD-10-CM

## 2020-10-21 DIAGNOSIS — M50.123 CERVICAL DISC DISORDER AT C6-C7 LEVEL WITH RADICULOPATHY: Primary | ICD-10-CM

## 2020-10-21 PROCEDURE — 97014 ELECTRIC STIMULATION THERAPY: CPT | Performed by: PHYSICAL THERAPIST

## 2020-10-21 PROCEDURE — 97140 MANUAL THERAPY 1/> REGIONS: CPT | Performed by: PHYSICAL THERAPIST

## 2020-10-21 NOTE — PROGRESS NOTES
Physical Therapy Daily Progress Note    VISIT#: 16    Subjective   Jacqueline Coronel reports that she saw her surgeon yesterday and he was really pleased with her healing.  He wanted her to continue physical therapy through the end of the year to work on progression toward her goals.   She is still having some residual pain and symptoms into her R arm.  She is noticing improvements with her swallowing but still has pain in her arm.    Objective     See Exercise, Manual, and Modality Logs for complete treatment.     Patient Education: Continue current HEP    Assessment & Plan     Assessment  Assessment details: Patient had moderate trigger point at L levator scapula insertion today.   It responded well to manual therapy and trigger point release.   She is demonstrating improved ROM and reporting improved daily symptoms.          Progress per Plan of Care and Progress strengthening /stabilization /functional activity            Timed:         Manual Therapy:    35     mins  84665;     Therapeutic Exercise:         mins  72081;     Neuromuscular Micky:        mins  40370;    Therapeutic Activity:          mins  26863;     Gait Training:           mins  34985;     Ultrasound:          mins  13855;    Ionto                                   mins   97353  Self Care                            mins   36171  Canalith Repos                   mins  02156    Un-Timed:  Electrical Stimulation:    15     mins  74702 ( );  Dry Needling          mins self-pay  Traction          mins 41699  Low Eval          Mins  49560  Mod Eval          Mins  06509  High Eval                            Mins  95641  Re-Eval                               mins  44054    Timed Treatment:   35   mins   Total Treatment:     50   mins    Katy Pina PT  Physical Therapist

## 2020-10-23 ENCOUNTER — APPOINTMENT (OUTPATIENT)
Dept: OTHER | Facility: HOSPITAL | Age: 40
End: 2020-10-23

## 2020-10-23 ENCOUNTER — APPOINTMENT (OUTPATIENT)
Dept: ONCOLOGY | Facility: HOSPITAL | Age: 40
End: 2020-10-23

## 2020-11-03 ENCOUNTER — TELEPHONE (OUTPATIENT)
Dept: PHYSICAL THERAPY | Facility: CLINIC | Age: 40
End: 2020-11-03

## 2020-11-20 ENCOUNTER — TREATMENT (OUTPATIENT)
Dept: PHYSICAL THERAPY | Facility: CLINIC | Age: 40
End: 2020-11-20

## 2020-11-20 DIAGNOSIS — R29.898 RIGHT ARM WEAKNESS: ICD-10-CM

## 2020-11-20 DIAGNOSIS — M50.123 CERVICAL DISC DISORDER AT C6-C7 LEVEL WITH RADICULOPATHY: Primary | ICD-10-CM

## 2020-11-20 PROCEDURE — 97014 ELECTRIC STIMULATION THERAPY: CPT | Performed by: PHYSICAL THERAPIST

## 2020-11-20 PROCEDURE — 97140 MANUAL THERAPY 1/> REGIONS: CPT | Performed by: PHYSICAL THERAPIST

## 2020-11-20 RX ORDER — BACLOFEN 10 MG/1
TABLET ORAL
Qty: 60 TABLET | Refills: 3 | Status: SHIPPED | OUTPATIENT
Start: 2020-11-20 | End: 2021-12-28

## 2020-11-20 NOTE — PROGRESS NOTES
Physical Therapy Daily Progress Note    VISIT#: 17    Subjective   Jacqueline Coronel reports that she hasn't been able to schedule PT in the past few weeks because her  tested positive for Covid.   She has been quarantined for several weeks but never was positive or symptomatic.    Since being out of therapy, she has struggled with increased neck stiffness, deep aching in her lower cervical region, and pain.  Pain Rating (0-10): 4    Objective     See Exercise, Manual, and Modality Logs for complete treatment.     Patient Education: Continue current HEP and focus on stretching at home    Assessment & Plan     Assessment  Assessment details: Patient had significant increase in muscle tightness and ROM restrictions since her last visit.  She responds best to focus on manual therapy and modalities.  She performs her exercises at home independently at this time.           Progress per Plan of Care and Progress strengthening /stabilization /functional activity            Timed:         Manual Therapy:    35     mins  46898;     Therapeutic Exercise:         mins  75849;     Neuromuscular Micky:        mins  60130;    Therapeutic Activity:          mins  82385;     Gait Training:           mins  56496;     Ultrasound:          mins  67438;    Ionto                                   mins   50119  Self Care                            mins   76532  Canalith Repos                   mins  44488    Un-Timed:  Electrical Stimulation:    15     mins  73846 ( );  Dry Needling          mins self-pay  Traction          mins 09644  Low Eval          Mins  50231  Mod Eval          Mins  16036  High Eval                            Mins  51664  Re-Eval                               mins  87789    Timed Treatment:   35   mins   Total Treatment:     50   mins    Katy iPna PT  Physical Therapist

## 2020-11-24 ENCOUNTER — TELEPHONE (OUTPATIENT)
Dept: PHYSICAL THERAPY | Facility: CLINIC | Age: 40
End: 2020-11-24

## 2020-12-01 ENCOUNTER — TREATMENT (OUTPATIENT)
Dept: PHYSICAL THERAPY | Facility: CLINIC | Age: 40
End: 2020-12-01

## 2020-12-01 DIAGNOSIS — M50.123 CERVICAL DISC DISORDER AT C6-C7 LEVEL WITH RADICULOPATHY: Primary | ICD-10-CM

## 2020-12-01 DIAGNOSIS — R29.898 RIGHT ARM WEAKNESS: ICD-10-CM

## 2020-12-01 PROCEDURE — 97140 MANUAL THERAPY 1/> REGIONS: CPT | Performed by: PHYSICAL THERAPIST

## 2020-12-01 PROCEDURE — 97014 ELECTRIC STIMULATION THERAPY: CPT | Performed by: PHYSICAL THERAPIST

## 2020-12-01 NOTE — PROGRESS NOTES
Physical Therapy Daily Progress Note    VISIT#: 18    Subjective   Jacqueline Coronel reports that her antibiotic was making her sick but her MD wanted her tested for COVID, just in case.  She states that she was just tested and it was negative.  As she was dealing with her ear infection, she states that she was more aware of the swelling and irritation in her neck.  She wants to continue PT as recommended by her surgeon until the end of the year and then she will follow-up with him in January.    Pain Rating (0-10): 3    Objective     See Exercise, Manual, and Modality Logs for complete treatment.     Patient Education: Continue gentle HEP.  Discussed gentle yoga alternatives for exercise.     Assessment & Plan     Assessment  Assessment details: Patient had moderate increase in tightness in L upper trap and levator scapulae with increased fascial restrictions noted at levator insertion.          Progress per Plan of Care and Progress strengthening /stabilization /functional activity            Timed:         Manual Therapy:    35     mins  36783;     Therapeutic Exercise:         mins  05822;     Neuromuscular Micky:        mins  01080;    Therapeutic Activity:          mins  64335;     Gait Training:           mins  68318;     Ultrasound:          mins  75152;    Ionto                                   mins   08565  Self Care                            mins   64950  Canalith Repos                   mins  07006    Un-Timed:  Electrical Stimulation:    15     mins  31845 ( );  Dry Needling          mins self-pay  Traction          mins 79917  Low Eval          Mins  21035  Mod Eval          Mins  09098  High Eval                            Mins  21421  Re-Eval                               mins  88367    Timed Treatment:   35   mins   Total Treatment:     50   mins    Katy Pina PT  Physical Therapist

## 2020-12-03 ENCOUNTER — TREATMENT (OUTPATIENT)
Dept: PHYSICAL THERAPY | Facility: CLINIC | Age: 40
End: 2020-12-03

## 2020-12-03 DIAGNOSIS — R29.898 RIGHT ARM WEAKNESS: ICD-10-CM

## 2020-12-03 DIAGNOSIS — M50.123 CERVICAL DISC DISORDER AT C6-C7 LEVEL WITH RADICULOPATHY: Primary | ICD-10-CM

## 2020-12-03 PROCEDURE — 97140 MANUAL THERAPY 1/> REGIONS: CPT | Performed by: PHYSICAL THERAPIST

## 2020-12-03 PROCEDURE — 97014 ELECTRIC STIMULATION THERAPY: CPT | Performed by: PHYSICAL THERAPIST

## 2020-12-03 NOTE — PROGRESS NOTES
Physical Therapy Daily Progress Note    VISIT#: 19    Subjective   Jacqueline Coronel reports that she feels like she pulled a muscle in her neck.  She feels stiff today and more restricted with her ROM.  Pain Rating (0-10): 4    Objective     See Exercise, Manual, and Modality Logs for complete treatment.     Patient Education: Continue gentle stretching at home    Assessment & Plan     Assessment  Assessment details: Patient reported a significant reduction in pain after treatment today.  She demonstrated increased cervical AROM after manual therapy and modalities.           Progress per Plan of Care and Progress strengthening /stabilization /functional activity            Timed:         Manual Therapy:    35     mins  76049;     Therapeutic Exercise:         mins  21030;     Neuromuscular Micky:        mins  20708;    Therapeutic Activity:          mins  54806;     Gait Training:           mins  50843;     Ultrasound:          mins  87265;    Ionto                                   mins   98311  Self Care                            mins   36223  Canalith Repos                   mins  89094    Un-Timed:  Electrical Stimulation:    15     mins  35910 ( );  Dry Needling          mins self-pay  Traction          mins 81869  Low Eval          Mins  96189  Mod Eval          Mins  06139  High Eval                            Mins  64946  Re-Eval                               mins  12698    Timed Treatment:   35   mins   Total Treatment:     50   mins    Katy Pina PT  Physical Therapist

## 2020-12-08 ENCOUNTER — TELEPHONE (OUTPATIENT)
Dept: PHYSICAL THERAPY | Facility: CLINIC | Age: 40
End: 2020-12-08

## 2020-12-10 ENCOUNTER — TREATMENT (OUTPATIENT)
Dept: PHYSICAL THERAPY | Facility: CLINIC | Age: 40
End: 2020-12-10

## 2020-12-10 DIAGNOSIS — M50.123 CERVICAL DISC DISORDER AT C6-C7 LEVEL WITH RADICULOPATHY: Primary | ICD-10-CM

## 2020-12-10 DIAGNOSIS — R29.898 RIGHT ARM WEAKNESS: ICD-10-CM

## 2020-12-10 PROCEDURE — 97140 MANUAL THERAPY 1/> REGIONS: CPT | Performed by: PHYSICAL THERAPIST

## 2020-12-10 PROCEDURE — 97014 ELECTRIC STIMULATION THERAPY: CPT | Performed by: PHYSICAL THERAPIST

## 2020-12-10 NOTE — PROGRESS NOTES
Physical Therapy Daily Progress Note    VISIT#: 20    Subjective   Jacqueline Coronel reports that she has been really sore and has a lot of deep aching in her lower neck.  She has been trying to be aware of her posture but she has been really busy at work.   Pain Rating (0-10): 4/10 today, 7/10 a couple days ago    Objective     See Exercise, Manual, and Modality Logs for complete treatment.     Patient Education: Continue gentle stretching at home and postural awareness.    Assessment & Plan     Assessment  Assessment details: Patient had moderate increase in tightness in L>R upper traps and levator scap.  She has been under more stress with busy schedule at work and birth of grandson this week so she is probably holding more tension in her muscles.  She is independent with her current HEP.           Progress per Plan of Care and Progress strengthening /stabilization /functional activity            Timed:         Manual Therapy:    35     mins  73502;     Therapeutic Exercise:         mins  00784;     Neuromuscular Micky:        mins  70213;    Therapeutic Activity:          mins  00388;     Gait Training:           mins  45392;     Ultrasound:          mins  05761;    Ionto                                   mins   16101  Self Care                            mins   06753  Canalith Repos                   mins  62565    Un-Timed:  Electrical Stimulation:    15     mins  02063 ( );  Dry Needling          mins self-pay  Traction          mins 47835  Low Eval          Mins  00590  Mod Eval          Mins  72324  High Eval                            Mins  16668  Re-Eval                               mins  37828    Timed Treatment:   35   mins   Total Treatment:     50   mins    Katy Pina PT  Physical Therapist

## 2020-12-14 ENCOUNTER — TELEPHONE (OUTPATIENT)
Dept: PHYSICAL THERAPY | Facility: CLINIC | Age: 40
End: 2020-12-14

## 2020-12-14 NOTE — TELEPHONE ENCOUNTER
PT CALLED EARLIER TO ASK IF [facility] HAD ANY OPENING FOR TODAY. CURRENTLY WE DID NOT. PT WOULD LIKE US TO CALL IF WE DO.   A 4:15 OPENED FOR TOMORROW. CALLED PT HAD TO M STATING I WOULD HOLD THAT FOR HER AND TO PLEASE GIVE ME A CALL.

## 2020-12-15 ENCOUNTER — TREATMENT (OUTPATIENT)
Dept: PHYSICAL THERAPY | Facility: CLINIC | Age: 40
End: 2020-12-15

## 2020-12-15 DIAGNOSIS — M50.123 CERVICAL DISC DISORDER AT C6-C7 LEVEL WITH RADICULOPATHY: Primary | ICD-10-CM

## 2020-12-15 DIAGNOSIS — R29.898 RIGHT ARM WEAKNESS: ICD-10-CM

## 2020-12-15 PROCEDURE — 97140 MANUAL THERAPY 1/> REGIONS: CPT | Performed by: PHYSICAL THERAPIST

## 2020-12-15 PROCEDURE — 97014 ELECTRIC STIMULATION THERAPY: CPT | Performed by: PHYSICAL THERAPIST

## 2020-12-15 NOTE — PROGRESS NOTES
Physical Therapy Daily Progress Note    VISIT#: 21    Subjective   Jacqueline Coronel reports that she has really been hurting more and it is concerning her.  She has had a return of her elbow and hand pain.   She is feeling tingling sensations into her fingers and hand.  Pain Rating (0-10): 5    Objective     See Exercise, Manual, and Modality Logs for complete treatment.     Patient Education: Ice and heat, gentle stretching    Assessment & Plan     Assessment  Assessment details: Patient had significant increase in tightness R upper trap and along medial scapular border.  She had a reduction in pain and tightness after addition of STM to medial scapular border in sidelying.           Progress per Plan of Care and Progress strengthening /stabilization /functional activity            Timed:         Manual Therapy:    35     mins  58565;     Therapeutic Exercise:         mins  38548;     Neuromuscular Micky:        mins  16643;    Therapeutic Activity:          mins  47355;     Gait Training:           mins  64435;     Ultrasound:          mins  12971;    Ionto                                   mins   87057  Self Care                            mins   39608  Canalith Repos                   mins  64111    Un-Timed:  Electrical Stimulation:    15     mins  37336 ( );  Dry Needling          mins self-pay  Traction          mins 04817  Low Eval          Mins  27618  Mod Eval          Mins  83957  High Eval                            Mins  84405  Re-Eval                               mins  67912    Timed Treatment:   35   mins   Total Treatment:     50   mins    Katy Pina PT  Physical Therapist

## 2020-12-16 ENCOUNTER — TREATMENT (OUTPATIENT)
Dept: PHYSICAL THERAPY | Facility: CLINIC | Age: 40
End: 2020-12-16

## 2020-12-16 DIAGNOSIS — M50.123 CERVICAL DISC DISORDER AT C6-C7 LEVEL WITH RADICULOPATHY: Primary | ICD-10-CM

## 2020-12-16 DIAGNOSIS — R29.898 RIGHT ARM WEAKNESS: ICD-10-CM

## 2020-12-16 PROCEDURE — 97140 MANUAL THERAPY 1/> REGIONS: CPT | Performed by: PHYSICAL THERAPIST

## 2020-12-16 PROCEDURE — 97014 ELECTRIC STIMULATION THERAPY: CPT | Performed by: PHYSICAL THERAPIST

## 2020-12-16 NOTE — PROGRESS NOTES
Physical Therapy Daily Progress Note    VISIT#: 22    Subjective   Jacqueline Coronel reports that she felt better since yesterday's appointment.  She still has moderate pain and tightness.  The pain in her elbow reminds her of the pain she had prior to her surgery and that scare her.  Pain Rating (0-10): 4    Objective     See Exercise, Manual, and Modality Logs for complete treatment.     Patient Education: Continue use of ice and heat, gentle stretching at home.    Assessment & Plan     Assessment  Assessment details: Patient had improvements in her muscle tightness since yesterday's treatment.  She responds well to manual therapy and modalities.  She performs her exercises at home.           Progress per Plan of Care and Progress strengthening /stabilization /functional activity            Timed:         Manual Therapy:    35     mins  97508;     Therapeutic Exercise:         mins  88666;     Neuromuscular Micky:        mins  50769;    Therapeutic Activity:          mins  23910;     Gait Training:           mins  62025;     Ultrasound:          mins  17195;    Ionto                                   mins   19764  Self Care                            mins   58014  Canalith Repos                   mins  20605    Un-Timed:  Electrical Stimulation:    15     mins  17792 ( );  Dry Needling          mins self-pay  Traction          mins 69550  Low Eval          Mins  42077  Mod Eval          Mins  31686  High Eval                            Mins  42572  Re-Eval                               mins  29591    Timed Treatment:   35   mins   Total Treatment:     50   mins    Katy Pina PT  Physical Therapist

## 2020-12-21 ENCOUNTER — TREATMENT (OUTPATIENT)
Dept: PHYSICAL THERAPY | Facility: CLINIC | Age: 40
End: 2020-12-21

## 2020-12-21 DIAGNOSIS — M50.123 CERVICAL DISC DISORDER AT C6-C7 LEVEL WITH RADICULOPATHY: Primary | ICD-10-CM

## 2020-12-21 DIAGNOSIS — R29.898 RIGHT ARM WEAKNESS: ICD-10-CM

## 2020-12-21 PROCEDURE — 97140 MANUAL THERAPY 1/> REGIONS: CPT | Performed by: PHYSICAL THERAPIST

## 2020-12-21 PROCEDURE — 97014 ELECTRIC STIMULATION THERAPY: CPT | Performed by: PHYSICAL THERAPIST

## 2020-12-21 NOTE — PROGRESS NOTES
Physical Therapy Daily Progress Note    VISIT#: 23    Subjective   Jacqueline Coronel reports that she still has more tightness in the right side and into her R arm.    Objective     See Exercise, Manual, and Modality Logs for complete treatment.     Patient Education: Continue current home stretching    Assessment & Plan     Assessment  Assessment details: Patient had less tightness throughout R upper trap and levator scapulae today.  She still has moderate symptoms into her R arm.          Progress per Plan of Care and Progress strengthening /stabilization /functional activity            Timed:         Manual Therapy:    35     mins  05744;     Therapeutic Exercise:         mins  98459;     Neuromuscular Micky:        mins  06976;    Therapeutic Activity:          mins  76531;     Gait Training:           mins  23103;     Ultrasound:          mins  59196;    Ionto                                   mins   13553  Self Care                            mins   04871  Canalith Repos                   mins  93212    Un-Timed:  Electrical Stimulation:    15     mins  75225 ( );  Dry Needling          mins self-pay  Traction          mins 43990  Low Eval          Mins  08656  Mod Eval          Mins  36040  High Eval                            Mins  30259  Re-Eval                               mins  02171    Timed Treatment:   35   mins   Total Treatment:     50   mins    Katy Pina PT  Physical Therapist

## 2020-12-23 ENCOUNTER — TREATMENT (OUTPATIENT)
Dept: PHYSICAL THERAPY | Facility: CLINIC | Age: 40
End: 2020-12-23

## 2020-12-23 DIAGNOSIS — M50.123 CERVICAL DISC DISORDER AT C6-C7 LEVEL WITH RADICULOPATHY: Primary | ICD-10-CM

## 2020-12-23 DIAGNOSIS — R29.898 RIGHT ARM WEAKNESS: ICD-10-CM

## 2020-12-23 PROCEDURE — 97014 ELECTRIC STIMULATION THERAPY: CPT | Performed by: PHYSICAL THERAPIST

## 2020-12-23 PROCEDURE — 97140 MANUAL THERAPY 1/> REGIONS: CPT | Performed by: PHYSICAL THERAPIST

## 2020-12-23 NOTE — PROGRESS NOTES
Physical Therapy Daily Progress Note    VISIT#: 24    Subjective   Jacqueline Coronel reports that she feels a deep ache in her lower cervical spine and she feels like she has popping in her neck.  She is frustrated that she can't lift her children or grandchild.  She doesn't understand why she is still so sore.  Pain Rating (0-10): 5    Objective     See Exercise, Manual, and Modality Logs for complete treatment.     Patient Education: Educated re: healing process     Assessment & Plan     Assessment  Assessment details: Patient had increased tightness at insertion of levator scapulae L>R but she responds best to manual therapy and myofascial release.   She had a reduction in pain after treatment and was reassured that her symptoms are part of the healing process and reaction to cold weather changes.           Progress per Plan of Care and Progress strengthening /stabilization /functional activity            Timed:         Manual Therapy:    30     mins  52635;     Therapeutic Exercise:         mins  80280;     Neuromuscular Micky:        mins  48950;    Therapeutic Activity:          mins  64397;     Gait Training:           mins  50422;     Ultrasound:          mins  24040;    Ionto                                   mins   44331  Self Care                            mins   25859  Canalith Repos                   mins  74335    Un-Timed:  Electrical Stimulation:    15     mins  66516 ( );  Dry Needling         mins self-pay  Traction          mins 67795  Low Eval          Mins  98533  Mod Eval          Mins  73260  High Eval                            Mins  71251  Re-Eval                               mins  04493    Timed Treatment:   30   mins   Total Treatment:     45   mins    Katy Pina PT  Physical Therapist

## 2020-12-25 ENCOUNTER — RESULTS ENCOUNTER (OUTPATIENT)
Dept: ONCOLOGY | Facility: CLINIC | Age: 40
End: 2020-12-25

## 2020-12-25 DIAGNOSIS — E83.110 HEREDITARY HEMOCHROMATOSIS (HCC): ICD-10-CM

## 2020-12-28 ENCOUNTER — TREATMENT (OUTPATIENT)
Dept: PHYSICAL THERAPY | Facility: CLINIC | Age: 40
End: 2020-12-28

## 2020-12-28 DIAGNOSIS — R29.898 RIGHT ARM WEAKNESS: ICD-10-CM

## 2020-12-28 DIAGNOSIS — M50.123 CERVICAL DISC DISORDER AT C6-C7 LEVEL WITH RADICULOPATHY: Primary | ICD-10-CM

## 2020-12-28 PROCEDURE — 97140 MANUAL THERAPY 1/> REGIONS: CPT | Performed by: PHYSICAL THERAPIST

## 2020-12-28 PROCEDURE — 97014 ELECTRIC STIMULATION THERAPY: CPT | Performed by: PHYSICAL THERAPIST

## 2020-12-28 NOTE — PROGRESS NOTES
Physical Therapy Daily Progress Note    VISIT#: 25    Subjective   Jacqueline Coronel reports that she can't get rid of the deep ache in her lower cervical spine.   She is still getting symptoms into her R arm and it is reminding her of her pre-surgery symptoms. She did have upper cervical involvement and is fearful that this may be causing her a problem.   She is interested in pursuing dry needling in the future.    Objective     See Exercise, Manual, and Modality Logs for complete treatment.     Patient Education: Given handout on dry needling and discussed treatment.  Reviewed HEP and instructed on exercises to focus on stretching and flexibility.     Assessment & Plan     Assessment  Assessment details: Patient has increased guarded posture with elevated R shoulder and forwardly rotated scapula due to muscle tightness.  Required verbal and tactile cues for posture correction and scapular depression.   Patient presented with increased muscle tension throughout R side of cervical spine and scapular region today.           Progress per Plan of Care and Progress strengthening /stabilization /functional activity.  Scheduled dry needling for next week.             Timed:         Manual Therapy:    30     mins  62887;     Therapeutic Exercise:         mins  96910;     Neuromuscular Micky:        mins  93581;    Therapeutic Activity:          mins  50002;     Gait Training:           mins  86830;     Ultrasound:          mins  70885;    Ionto                                   mins   80620  Self Care                            mins   39919  Canalith Repos                   mins  83613    Un-Timed:  Electrical Stimulation:    15     mins  74974 ( );  Dry Needling          mins self-pay  Traction          mins 77489  Low Eval          Mins  46119  Mod Eval          Mins  59777  High Eval                            Mins  59565  Re-Eval                               mins  83349    Timed Treatment:   30   mins   Total  Treatment:     45   mins    Katy Pina, PT  Physical Therapist

## 2020-12-30 ENCOUNTER — TREATMENT (OUTPATIENT)
Dept: PHYSICAL THERAPY | Facility: CLINIC | Age: 40
End: 2020-12-30

## 2020-12-30 DIAGNOSIS — R29.898 RIGHT ARM WEAKNESS: ICD-10-CM

## 2020-12-30 DIAGNOSIS — M50.123 CERVICAL DISC DISORDER AT C6-C7 LEVEL WITH RADICULOPATHY: Primary | ICD-10-CM

## 2020-12-30 PROCEDURE — 97140 MANUAL THERAPY 1/> REGIONS: CPT | Performed by: PHYSICAL THERAPIST

## 2020-12-30 PROCEDURE — 97014 ELECTRIC STIMULATION THERAPY: CPT | Performed by: PHYSICAL THERAPIST

## 2020-12-30 NOTE — PROGRESS NOTES
Physical Therapy Daily Progress Note    VISIT#: 26    Subjective   Jacqueline Coronel reports that she felt so much better after her last PT session and her neck hasn't felt as stiff and tight.  She did get a bad migraine yesterday so she is feeling the residual effects from that today.  Pain Rating (0-10): 4    Objective     See Exercise, Manual, and Modality Logs for complete treatment.     Patient Education: Discussed ongoing treatment plan and dry needling to be added to upcoming treatment    Assessment & Plan     Assessment  Assessment details: Patient demonstrated a significant reduction in bilateral upper trap tightness today since her last visit.   She continues with mild headache today but reported a reduction in pain after treatment.           Progress per Plan of Care and Progress strengthening /stabilization /functional activity.  Consider dry needling at next visit for R>L upper trap tightness and muscle guarding.            Timed:         Manual Therapy:    30     mins  35498;     Therapeutic Exercise:         mins  63266;     Neuromuscular Micky:        mins  78711;    Therapeutic Activity:          mins  36230;     Gait Training:           mins  06797;     Ultrasound:          mins  45283;    Ionto                                   mins   15120  Self Care                            mins   62745  Canalith Repos                   mins  76193    Un-Timed:  Electrical Stimulation:    15     mins  19059 ( );  Dry Needling          mins self-pay  Traction          mins 10280  Low Eval          Mins  87702  Mod Eval          Mins  33377  High Eval                            Mins  98402  Re-Eval                               mins  38508    Timed Treatment:   30   mins   Total Treatment:     45   mins    Katy Pina PT  Physical Therapist

## 2020-12-31 ENCOUNTER — HOSPITAL ENCOUNTER (OUTPATIENT)
Dept: GENERAL RADIOLOGY | Facility: HOSPITAL | Age: 40
Discharge: HOME | End: 2020-12-31
Admitting: NEUROLOGICAL SURGERY

## 2020-12-31 DIAGNOSIS — Z48.89 POSTOPERATIVE VISIT: ICD-10-CM

## 2020-12-31 PROCEDURE — 72050 X-RAY EXAM NECK SPINE 4/5VWS: CPT

## 2021-01-04 ENCOUNTER — TREATMENT (OUTPATIENT)
Dept: PHYSICAL THERAPY | Facility: CLINIC | Age: 41
End: 2021-01-04

## 2021-01-04 DIAGNOSIS — M50.123 CERVICAL DISC DISORDER AT C6-C7 LEVEL WITH RADICULOPATHY: Primary | ICD-10-CM

## 2021-01-04 DIAGNOSIS — Z48.89 POSTOPERATIVE VISIT: ICD-10-CM

## 2021-01-04 DIAGNOSIS — R29.898 RIGHT ARM WEAKNESS: ICD-10-CM

## 2021-01-04 PROCEDURE — DRYNDL PR CUSTOM DRY NEEDLING SELF PAY: Performed by: PHYSICAL THERAPIST

## 2021-01-04 PROCEDURE — 97014 ELECTRIC STIMULATION THERAPY: CPT | Performed by: PHYSICAL THERAPIST

## 2021-01-04 PROCEDURE — 97530 THERAPEUTIC ACTIVITIES: CPT | Performed by: PHYSICAL THERAPIST

## 2021-01-04 NOTE — PROGRESS NOTES
Physical Therapy Daily Progress Note    VISIT#: 27    Subjective   Jacqueline Coronel reports that she still has a spot on the back of the neck/shoulder that continues to bother her a lot. She wishes to do the dry needling today.  Pain Rating: not assessed today    Objective     See Exercise, Manual, and Modality Logs for complete treatment.     Patient Education: TDN, TDN aftercare, risks of procedure, precautions    Assessment & Plan     Assessment  Assessment details: Patient demonstrated active TrPs in the right UT and levator scapulae. Patient tolerated TDN with pistoning of the UT well; demonstrated achiness with needling. Patient did not demonstrate any muscle twitching. Patient tolerated TDN well with no complications. PT to assess at next visit.        Progress per Plan of Care and Progress strengthening /stabilization /functional activity          Timed:         Manual Therapy:         mins  04911;     Therapeutic Exercise:         mins  02914;     Neuromuscular Micky:        mins  09277;    Therapeutic Activity:     10     mins  31991;     Gait Training:           mins  04379;     Ultrasound:          mins  78488;    Ionto                                   mins   50516  Self Care                            mins   48634  Canalith Repos                   mins  56472    Un-Timed:  Electrical Stimulation:    15     mins  82089 ( );  Dry Needling     20     mins self-pay  Traction          mins 92256  Low Eval          Mins  90007  Mod Eval          Mins  01438  High Eval                            Mins  20810  Re-Eval                               mins  31330    Timed Treatment:   10   mins   Total Treatment:     45   mins    Tricia Durán PT  Physical Therapist  [place] License # [id number]

## 2021-01-07 ENCOUNTER — TREATMENT (OUTPATIENT)
Dept: PHYSICAL THERAPY | Facility: CLINIC | Age: 41
End: 2021-01-07

## 2021-01-07 DIAGNOSIS — M50.123 CERVICAL DISC DISORDER AT C6-C7 LEVEL WITH RADICULOPATHY: Primary | ICD-10-CM

## 2021-01-07 DIAGNOSIS — Z48.89 POSTOPERATIVE VISIT: ICD-10-CM

## 2021-01-07 DIAGNOSIS — R29.898 RIGHT ARM WEAKNESS: ICD-10-CM

## 2021-01-07 PROCEDURE — 97530 THERAPEUTIC ACTIVITIES: CPT | Performed by: PHYSICAL THERAPIST

## 2021-01-07 PROCEDURE — 97014 ELECTRIC STIMULATION THERAPY: CPT | Performed by: PHYSICAL THERAPIST

## 2021-01-07 PROCEDURE — 97140 MANUAL THERAPY 1/> REGIONS: CPT | Performed by: PHYSICAL THERAPIST

## 2021-01-07 NOTE — PROGRESS NOTES
Re-Assessment / Re-Certification        Patient: Jacqueline Coronel   : 1980  Diagnosis/ICD-10 Code:  Cervical disc disorder at C6-C7 level with radiculopathy [M50.123]  Referring practitioner: Shar Muñoz MD  Date of Initial Visit: Type: THERAPY  Noted: 2020  Today's Date: 2021  Patient seen for 28 sessions      Subjective:   Jacqueline Coronel reports: that she woke up the day after dry needling and felt sore, however, a couple hours later she felt better. She feels like she has pain under the shoulder.  Subjective Questionnaire: NDI:40  Clinical Progress: improved  Home Program Compliance: Yes  Treatment has included: therapeutic exercise, neuromuscular re-education, manual therapy, therapeutic activity, electrical stimulation, dry needling, moist heat and cryotherapy     Objective          Palpation     Right   Hypertonic in the cervical interspinals, cervical paraspinals, levator scapulae, pectoralis major, pectoralis minor, rhomboids, scalenes, suboccipitals and upper trapezius. Tenderness of the cervical interspinals, cervical paraspinals, levator scapulae, rhomboids and suboccipitals.   Trigger point to levator scapulae and rhomboids.       Assessment & Plan     Assessment  Assessment details: The patient is progressing well in PT at this time, making improvements in strength, ROM, and muscle extensibility. However, she continues to demonstrate the following impairments: Increased pain, increased muscle tone, decreased strength, decreased joint mobility, and decreased ROM. Due to these impairments, the patient continues to have numbness/tingling into the hands, weakness in the hands, has difficulty sleeping at night, and turning the head side to side. She would continue to benefit from skilled PT to address remaining functional limitations and impairments and to improve patient quality of life.    Goals  Plan Goals: Plan Goals: STG's: 2 weeks   Patient will report a reduction in pain by >25%  -MET  Patient will be able to perform HEP with minimal verbal cues  - MET    LTG's: By discharge   Patient will report a reduction in pain by >75% - PARTIALLY MET  Patient will have a >10 point improvement on the Neck Disability Index to demonstrate overall improvement in daily functional level - PARTIALLY MET  Patient will be able to reach into overhead cabinet to get a dish without pain or difficulty - PARTIALLY MET  Patient will be able to tolerate sitting > 60 minutes without increased pain - PARTIALLY MET  Patient will demonstrate sufficient cervical AROM to allow patient to turn her head to view blindspots when driving - PARTIALLY MET  Patient will be able to sleep > 5 hours without waking in pain - PARTIALLY MET  Patient will be independent with HEP - PARTIALLY MET    Plan  Therapy options: will be seen for skilled physical therapy services  Planned modality interventions: cryotherapy, dry needling, electrical stimulation/Russian stimulation, TENS and thermotherapy (hydrocollator packs)  Duration in visits: 20  Treatment plan discussed with: patient      Progress toward previous goals: Partially Met        Recommendations: Continue as planned  Timeframe: 3 months  Prognosis to achieve goals: good    PT Signature: Tricia Durán PT      Based upon review of the patient's progress and continued therapy plan, it is my medical opinion that Jacqueline Coronel should continue physical therapy treatment at [facility]  [address] [place] [address] [place] [address] [phone].    Signature: __________________________________  Shar Muñoz MD    Timed:         Manual Therapy:    30     mins  09336;     Therapeutic Exercise:         mins  88102;     Neuromuscular Micky:        mins  27452;    Therapeutic Activity:     15     mins  58777;     Gait Training:           mins  17559;     Ultrasound:          mins  73500;    Ionto                                    mins   98032  Self Care                            mins   96659  Canalith Repos                  mins   49692    Un-Timed:  Electrical Stimulation:    15     mins  79479 ( );  Dry Needling          mins self-pay  Traction          mins 49157  Low Eval          Mins  03286  Mod Eval          Mins  15618  High Eval                            Mins  48531  Re-Eval                               mins  14644      Timed Treatment:   45   mins   Total Treatment:     60   mins

## 2021-01-08 ENCOUNTER — INFUSION (OUTPATIENT)
Dept: ONCOLOGY | Facility: HOSPITAL | Age: 41
End: 2021-01-08

## 2021-01-08 ENCOUNTER — LAB (OUTPATIENT)
Dept: OTHER | Facility: HOSPITAL | Age: 41
End: 2021-01-08

## 2021-01-08 VITALS
TEMPERATURE: 98.8 F | OXYGEN SATURATION: 97 % | HEART RATE: 61 BPM | DIASTOLIC BLOOD PRESSURE: 78 MMHG | SYSTOLIC BLOOD PRESSURE: 124 MMHG

## 2021-01-08 DIAGNOSIS — E83.110 HEREDITARY HEMOCHROMATOSIS (HCC): ICD-10-CM

## 2021-01-08 LAB
BASOPHILS # BLD AUTO: 0.03 10*3/MM3 (ref 0–0.2)
BASOPHILS NFR BLD AUTO: 0.6 % (ref 0–1.5)
DEPRECATED RDW RBC AUTO: 43.2 FL (ref 37–54)
EOSINOPHIL # BLD AUTO: 0.18 10*3/MM3 (ref 0–0.4)
EOSINOPHIL NFR BLD AUTO: 3.3 % (ref 0.3–6.2)
ERYTHROCYTE [DISTWIDTH] IN BLOOD BY AUTOMATED COUNT: 13 % (ref 12.3–15.4)
FERRITIN SERPL-MCNC: 46.8 NG/ML (ref 13–150)
HCT VFR BLD AUTO: 41.1 % (ref 34–46.6)
HGB BLD-MCNC: 13.5 G/DL (ref 12–15.9)
IMM GRANULOCYTES # BLD AUTO: 0.01 10*3/MM3 (ref 0–0.05)
IMM GRANULOCYTES NFR BLD AUTO: 0.2 % (ref 0–0.5)
IRON 24H UR-MRATE: 158 MCG/DL (ref 37–145)
IRON SATN MFR SERPL: 62 % (ref 20–50)
LYMPHOCYTES # BLD AUTO: 1.48 10*3/MM3 (ref 0.7–3.1)
LYMPHOCYTES NFR BLD AUTO: 27.3 % (ref 19.6–45.3)
MCH RBC QN AUTO: 29.7 PG (ref 26.6–33)
MCHC RBC AUTO-ENTMCNC: 32.8 G/DL (ref 31.5–35.7)
MCV RBC AUTO: 90.5 FL (ref 79–97)
MONOCYTES # BLD AUTO: 0.37 10*3/MM3 (ref 0.1–0.9)
MONOCYTES NFR BLD AUTO: 6.8 % (ref 5–12)
NEUTROPHILS NFR BLD AUTO: 3.36 10*3/MM3 (ref 1.7–7)
NEUTROPHILS NFR BLD AUTO: 61.8 % (ref 42.7–76)
NRBC BLD AUTO-RTO: 0 /100 WBC (ref 0–0.2)
PLATELET # BLD AUTO: 206 10*3/MM3 (ref 140–450)
PMV BLD AUTO: 10.2 FL (ref 6–12)
RBC # BLD AUTO: 4.54 10*6/MM3 (ref 3.77–5.28)
TIBC SERPL-MCNC: 253 MCG/DL (ref 298–536)
TRANSFERRIN SERPL-MCNC: 170 MG/DL (ref 200–360)
WBC # BLD AUTO: 5.43 10*3/MM3 (ref 3.4–10.8)

## 2021-01-08 PROCEDURE — 84466 ASSAY OF TRANSFERRIN: CPT | Performed by: INTERNAL MEDICINE

## 2021-01-08 PROCEDURE — 85025 COMPLETE CBC W/AUTO DIFF WBC: CPT | Performed by: INTERNAL MEDICINE

## 2021-01-08 PROCEDURE — 82728 ASSAY OF FERRITIN: CPT | Performed by: INTERNAL MEDICINE

## 2021-01-08 PROCEDURE — 36415 COLL VENOUS BLD VENIPUNCTURE: CPT

## 2021-01-08 PROCEDURE — 83540 ASSAY OF IRON: CPT | Performed by: INTERNAL MEDICINE

## 2021-01-08 RX ORDER — NEBIVOLOL HYDROCHLORIDE 5 MG/1
5 TABLET ORAL DAILY
COMMUNITY
Start: 2020-10-21 | End: 2022-07-19

## 2021-01-08 NOTE — NURSING NOTE
Phlebotomy held today since ferritin returned at 46.8. The pt receives a phlebotomy (300cc to be removed) when the ferritin is > 50. The pt was informed and v/u. She is scheduled to return to the office in April 2021 for MD visit and possible phlebotomy.

## 2021-01-13 ENCOUNTER — TREATMENT (OUTPATIENT)
Dept: PHYSICAL THERAPY | Facility: CLINIC | Age: 41
End: 2021-01-13

## 2021-01-13 DIAGNOSIS — Z48.89 POSTOPERATIVE VISIT: ICD-10-CM

## 2021-01-13 DIAGNOSIS — M50.123 CERVICAL DISC DISORDER AT C6-C7 LEVEL WITH RADICULOPATHY: Primary | ICD-10-CM

## 2021-01-13 DIAGNOSIS — R29.898 RIGHT ARM WEAKNESS: ICD-10-CM

## 2021-01-13 PROCEDURE — DRYNDL PR CUSTOM DRY NEEDLING SELF PAY: Performed by: PHYSICAL THERAPIST

## 2021-01-13 PROCEDURE — 97014 ELECTRIC STIMULATION THERAPY: CPT | Performed by: PHYSICAL THERAPIST

## 2021-01-13 NOTE — PROGRESS NOTES
Physical Therapy Daily Progress Note    VISIT#: 29    Subjective   Jacqueline Coronel reports that her neck is hurting a lot today. She has severe pain and burning down the right shoulder, arm, and hand. She feels a lot of the same symptoms she had prior to surgery. She hasn't been sleeping at night due to pain. She would like to do the dry needling today.  Pain Ratin    Objective     See Exercise, Manual, and Modality Logs for complete treatment.     Patient Education: dry needling aftercare    Assessment & Plan     Assessment  Assessment details: The patient demonstrated active TrPs in the bilateral upper traps, R teres minor, and infraspinatus at today's visit. Patient demonstrated muscle twitching with pistoning TDN in the BL UTs, muscle tone improved following TDN. Patient responded well with no complications or bleeding. TDN followed by e-stim and heat.        Progress per Plan of Care and Progress strengthening /stabilization /functional activity          Timed:         Manual Therapy:         mins  52865;     Therapeutic Exercise:         mins  20050;     Neuromuscular Micky:        mins  00977;    Therapeutic Activity:          mins  96421;     Gait Training:           mins  05389;     Ultrasound:          mins  79467;    Ionto                                   mins   43362  Self Care                            mins   05925  Canalith Repos                   mins  36469    Un-Timed:  Electrical Stimulation:    15     mins  11264 ( );  Dry Needling     25     mins self-pay  Traction          mins 46797  Low Eval          Mins  87135  Mod Eval          Mins  62909  High Eval                            Mins  93877  Re-Eval                               mins  58316    Timed Treatment:   0   mins   Total Treatment:     40   mins    Tricia Durán PT  Physical Therapist  [place] License # [id number]

## 2021-01-19 NOTE — PROGRESS NOTES
Subjective   Patient ID: Jacqueline Coronel is a 41 y.o. female is here today for follow-up.    Patient was last seen 10.19.20 for back pain, numbness and tingling in her right arm and hand, and a burning pain in her in her right forearm that goes up to her neck    Patient is approximately 7 months post op. She states that she is not able to function at the capacity she thought she would be able to.  .  Patient states that her right palm, middle finger, index finger are always tingle.    You have chosen to receive care through a telephone visit. Do you consent to use a telephone visit for your medical care today? Yes.    This was a televisit lasting 8 minutes.  The patient was at home.  Its been 6 months since her surgery and she feels that she is retrogressed a bit.  She still has persistent right arm pain and numbness and tingling and weakness.  It still is identical in its location to the symptoms before.  Her x-rays were discussed and they look fine.  She is fused at C6-C7.  I be concerned at C5-C6 although it is a little bit early for adjacent level disease.  It is theoretically possible.  I told her we should get at this point a new MRI to look at C5-C6 or even C7-T1 to see if there are any adjacent level changes.    We will have her come back for a face-to-face visit.        Neck Pain   Pain location: rt shoulder rt arm. The quality of the pain is described as aching, burning and shooting. The pain is at a severity of 3/10. The symptoms are aggravated by bending and twisting. Associated symptoms include numbness and tingling. Pertinent negatives include no fever. She has tried heat for the symptoms. The treatment provided mild relief.       The following portions of the patient's history were reviewed and updated as appropriate: allergies, current medications, past family history, past medical history, past social history, past surgical history and problem list.    Review of Systems   Constitutional: Negative for  chills and fever.   HENT: Negative for congestion.    Musculoskeletal: Positive for neck pain.   Neurological: Positive for tingling and numbness.           Objective     There were no vitals filed for this visit.  There is no height or weight on file to calculate BMI.      Physical Exam   Deferred  Neurologic Exam   Deferred        Assessment/Plan   Independent Review of Radiographic Studies:      I personally reviewed the images from the following studies.    I reviewed the postoperative x-rays done recently that showed good positioning of the hardware at C6-C7 with no loosening of screws plate or cage.  Agree with the report.        Medical Decision Making:      Because of the persistent symptoms we will get an MRI of the cervical spine with and without contrast and a return visit for face-to-face evaluation.      Diagnoses and all orders for this visit:    1. Cervical disc disorder at C5-C6 level with radiculopathy (Primary)  -     MRI Cervical Spine With & Without Contrast; Future    2. History of spinal fusion  -     MRI Cervical Spine With & Without Contrast; Future    3. Neuropathic pain, arm  -     MRI Cervical Spine With & Without Contrast; Future      Return in about 1 week (around 1/27/2021) for After MRI scan as a face-to-face visit.

## 2021-01-20 ENCOUNTER — OFFICE VISIT (OUTPATIENT)
Dept: NEUROSURGERY | Facility: CLINIC | Age: 41
End: 2021-01-20

## 2021-01-20 DIAGNOSIS — Z98.1 HISTORY OF SPINAL FUSION: ICD-10-CM

## 2021-01-20 DIAGNOSIS — M79.2 NEUROPATHIC PAIN, ARM: ICD-10-CM

## 2021-01-20 DIAGNOSIS — M50.122 CERVICAL DISC DISORDER AT C5-C6 LEVEL WITH RADICULOPATHY: Primary | ICD-10-CM

## 2021-01-20 PROCEDURE — 99441 PR PHYS/QHP TELEPHONE EVALUATION 5-10 MIN: CPT | Performed by: NEUROLOGICAL SURGERY

## 2021-01-21 ENCOUNTER — TREATMENT (OUTPATIENT)
Dept: PHYSICAL THERAPY | Facility: CLINIC | Age: 41
End: 2021-01-21

## 2021-01-21 DIAGNOSIS — M50.123 CERVICAL DISC DISORDER AT C6-C7 LEVEL WITH RADICULOPATHY: Primary | ICD-10-CM

## 2021-01-21 DIAGNOSIS — Z48.89 POSTOPERATIVE VISIT: ICD-10-CM

## 2021-01-21 DIAGNOSIS — R29.898 RIGHT ARM WEAKNESS: ICD-10-CM

## 2021-01-21 PROCEDURE — 97530 THERAPEUTIC ACTIVITIES: CPT | Performed by: PHYSICAL THERAPIST

## 2021-01-21 PROCEDURE — 97014 ELECTRIC STIMULATION THERAPY: CPT | Performed by: PHYSICAL THERAPIST

## 2021-01-21 PROCEDURE — 97140 MANUAL THERAPY 1/> REGIONS: CPT | Performed by: PHYSICAL THERAPIST

## 2021-01-21 NOTE — PROGRESS NOTES
Physical Therapy Daily Progress Note    VISIT#: 30    Subjective   Jacqueline Coronel reports that she had a telehealth visit with her surgeon yesterday and he wants to get another MRI to see what is going on. He wants to continue PT at this time. Patient got a lot more relief from the dry needling. Pain is more into the shoulder blade and posterior shoulder at this time. The upper trap pain has gotten better.  Pain Ratin    Objective     See Exercise, Manual, and Modality Logs for complete treatment.     Patient Education: POC    Assessment & Plan     Assessment  Assessment details: The patient demonstrated hypertonicity and tenderness along the R levator scap and infraspinatus which responded well to myofascial release. Patient and PT discussed dry needling treatment in future session for pain reduction.        Progress per Plan of Care and Progress strengthening /stabilization /functional activity          Timed:         Manual Therapy:    25     mins  99476;     Therapeutic Exercise:         mins  13412;     Neuromuscular Micky:        mins  34227;    Therapeutic Activity:     10     mins  56687;     Gait Training:           mins  48026;     Ultrasound:          mins  70654;    Ionto                                   mins   93040  Self Care                            mins   12825  Canalith Repos                   mins  68592    Un-Timed:  Electrical Stimulation:   15      mins  83528 ( );  Dry Needling          mins self-pay  Traction          mins 92265  Low Eval          Mins  76165  Mod Eval          Mins  40735  High Eval                            Mins  05698  Re-Eval                               mins  86332    Timed Treatment:   35   mins   Total Treatment:     50   mins    Tricia Durán PT  Physical Therapist  [place] License # [id number]

## 2021-02-05 ENCOUNTER — TREATMENT (OUTPATIENT)
Dept: PHYSICAL THERAPY | Facility: CLINIC | Age: 41
End: 2021-02-05

## 2021-02-05 DIAGNOSIS — M50.123 CERVICAL DISC DISORDER AT C6-C7 LEVEL WITH RADICULOPATHY: Primary | ICD-10-CM

## 2021-02-05 DIAGNOSIS — R29.898 RIGHT ARM WEAKNESS: ICD-10-CM

## 2021-02-05 DIAGNOSIS — Z48.89 POSTOPERATIVE VISIT: ICD-10-CM

## 2021-02-05 PROCEDURE — 97014 ELECTRIC STIMULATION THERAPY: CPT | Performed by: PHYSICAL THERAPIST

## 2021-02-05 PROCEDURE — DRYNDL PR CUSTOM DRY NEEDLING SELF PAY: Performed by: PHYSICAL THERAPIST

## 2021-02-05 NOTE — PROGRESS NOTES
Physical Therapy Daily Progress Note    VISIT#: 31    Subjective   Jacqueline Coronel reports that she has been in a lot of pain and discomfort lately. She has been sitting on her heating pad and stretching a lot lately. She has been trying to use a tennis ball to release some of her trigger points. Patient wishes to perform dry needling today.  Pain Ratin    Objective     See Exercise, Manual, and Modality Logs for complete treatment.     Patient Education: dry needling    Assessment & Plan     Assessment  Assessment details: The patient tolerated trigger point dry needling well today with no complications. Patient demonstrated muscle twitching with TDN in the BL upper trapezius, patient with a reduction in tone and pain following TDN and modalities.         Progress per Plan of Care and Progress strengthening /stabilization /functional activity          Timed:         Manual Therapy:         mins  83973;     Therapeutic Exercise:         mins  34899;     Neuromuscular Micky:        mins  52033;    Therapeutic Activity:          mins  54938;     Gait Training:           mins  88226;     Ultrasound:          mins  37584;    Ionto                                   mins   36361  Self Care                            mins   55357  Canalith Repos                   mins  53216    Un-Timed:  Electrical Stimulation:    15     mins  41873 ( );  Dry Needling     30     mins self-pay  Traction          mins 11210  Low Eval          Mins  03571  Mod Eval          Mins  57862  High Eval                            Mins  04925  Re-Eval                               mins  14217    Timed Treatment:   0   mins   Total Treatment:     45   mins    Tricia Durán PT  Physical Therapist  [place] License # [id number]

## 2021-02-09 ENCOUNTER — TREATMENT (OUTPATIENT)
Dept: PHYSICAL THERAPY | Facility: CLINIC | Age: 41
End: 2021-02-09

## 2021-02-09 DIAGNOSIS — M50.123 CERVICAL DISC DISORDER AT C6-C7 LEVEL WITH RADICULOPATHY: Primary | ICD-10-CM

## 2021-02-09 DIAGNOSIS — Z48.89 POSTOPERATIVE VISIT: ICD-10-CM

## 2021-02-09 DIAGNOSIS — R29.898 RIGHT ARM WEAKNESS: ICD-10-CM

## 2021-02-09 PROCEDURE — 97110 THERAPEUTIC EXERCISES: CPT | Performed by: PHYSICAL THERAPIST

## 2021-02-09 PROCEDURE — 97140 MANUAL THERAPY 1/> REGIONS: CPT | Performed by: PHYSICAL THERAPIST

## 2021-02-09 PROCEDURE — 97014 ELECTRIC STIMULATION THERAPY: CPT | Performed by: PHYSICAL THERAPIST

## 2021-02-09 NOTE — PROGRESS NOTES
Physical Therapy Daily Progress Note    VISIT#: 32    Subjective   Jacqueline Coronel reports that she did great after the needling. She had some discomfort for the first 24 hours but felt the benefits the next day. She still has the pain around the shoulder blade area.  Pain Ratin    Objective     See Exercise, Manual, and Modality Logs for complete treatment.     Patient Education: HEP    Assessment & Plan     Assessment  Assessment details: The patient demonstrated tenderness and hypertonicity along the right levator scap which responded well to myofascial release. Patient was given shoulder and arm exercises with TB for continued self care.        Progress per Plan of Care and Progress strengthening /stabilization /functional activity          Timed:         Manual Therapy:    25     mins  89530;     Therapeutic Exercise:    8     mins  82311;     Neuromuscular Micky:        mins  86578;    Therapeutic Activity:          mins  79912;     Gait Training:           mins  92652;     Ultrasound:          mins  04117;    Ionto                                   mins   57913  Self Care                            mins   88653  Canalith Repos                   mins  06801    Un-Timed:  Electrical Stimulation:   15      mins  49093 ( );  Dry Needling          mins self-pay  Traction          mins 60394  Low Eval          Mins  42489  Mod Eval          Mins  45685  High Eval                            Mins  77970  Re-Eval                               mins  34986    Timed Treatment:   33   mins   Total Treatment:     48   mins    Tricia Durán PT  Physical Therapist  [place] License # [id number]

## 2021-02-16 ENCOUNTER — TREATMENT (OUTPATIENT)
Dept: PHYSICAL THERAPY | Facility: CLINIC | Age: 41
End: 2021-02-16

## 2021-02-16 DIAGNOSIS — R29.898 RIGHT ARM WEAKNESS: ICD-10-CM

## 2021-02-16 DIAGNOSIS — M50.123 CERVICAL DISC DISORDER AT C6-C7 LEVEL WITH RADICULOPATHY: Primary | ICD-10-CM

## 2021-02-16 DIAGNOSIS — Z48.89 POSTOPERATIVE VISIT: ICD-10-CM

## 2021-02-16 PROCEDURE — 97014 ELECTRIC STIMULATION THERAPY: CPT | Performed by: PHYSICAL THERAPIST

## 2021-02-16 PROCEDURE — DRYNDL PR CUSTOM DRY NEEDLING SELF PAY: Performed by: PHYSICAL THERAPIST

## 2021-02-16 NOTE — PROGRESS NOTES
Physical Therapy Daily Progress Note    VISIT#: [id number]    Subjective   Jacqueline Coronel reports that her neck is bothering her a lot today, she is having burning into the right palm today. She is wanting to do the dry needling to get relief.  Pain Rating: 3    Objective     See Exercise, Manual, and Modality Logs for complete treatment.     Patient Education: risk of bruising    Assessment & Plan     Assessment  Assessment details: The patient tolerated trigger point dry needling well today, demonstrated twitching in the BL upper traps. Patient demonstrated slight bleeding with the needling insertion at the infraspinatus, responded well to pressure. Patient was educated on risk of bruising with TDN.        Progress per Plan of Care and Progress strengthening /stabilization /functional activity          Timed:         Manual Therapy:         mins  43369;     Therapeutic Exercise:         mins  52474;     Neuromuscular Micky:        mins  06784;    Therapeutic Activity:          mins  13687;     Gait Training:           mins  45020;     Ultrasound:          mins  86770;    Ionto                                   mins   96494  Self Care                            mins   71703  Canalith Repos                   mins  99918    Un-Timed:  Electrical Stimulation:    15     mins  63603 ( );  Dry Needling   25       mins self-pay  Traction          mins 26935  Low Eval          Mins  67880  Mod Eval          Mins  74082  High Eval                            Mins  13732  Re-Eval                               mins  45377    Timed Treatment:   0   mins   Total Treatment:     40   mins    Tricia Durán PT  Physical Therapist  [place] License # [id number]

## 2021-02-19 ENCOUNTER — APPOINTMENT (OUTPATIENT)
Dept: MRI IMAGING | Facility: HOSPITAL | Age: 41
End: 2021-02-19

## 2021-02-25 ENCOUNTER — APPOINTMENT (OUTPATIENT)
Dept: MRI IMAGING | Facility: HOSPITAL | Age: 41
End: 2021-02-25

## 2021-02-25 ENCOUNTER — TELEPHONE (OUTPATIENT)
Dept: PHYSICAL THERAPY | Facility: CLINIC | Age: 41
End: 2021-02-25

## 2021-03-04 ENCOUNTER — TELEPHONE (OUTPATIENT)
Dept: NEUROSURGERY | Facility: CLINIC | Age: 41
End: 2021-03-04

## 2021-03-04 NOTE — TELEPHONE ENCOUNTER
Called the patient about getting her MRI scheduled and her fup appointment. Dr. Muñoz did a Peer to Peer 2/24/2021 and her MRI was approved. However she has not r/s it yet. I had to [facility] to call the office.

## 2021-03-23 ENCOUNTER — TELEPHONE (OUTPATIENT)
Dept: PHYSICAL THERAPY | Facility: CLINIC | Age: 41
End: 2021-03-23

## 2021-04-08 RX ORDER — SODIUM CHLORIDE 9 MG/ML
250 INJECTION, SOLUTION INTRAVENOUS ONCE
OUTPATIENT
Start: 2021-04-09

## 2021-04-09 ENCOUNTER — APPOINTMENT (OUTPATIENT)
Dept: ONCOLOGY | Facility: HOSPITAL | Age: 41
End: 2021-04-09

## 2021-04-09 ENCOUNTER — APPOINTMENT (OUTPATIENT)
Dept: OTHER | Facility: HOSPITAL | Age: 41
End: 2021-04-09

## 2021-04-20 ENCOUNTER — DOCUMENTATION (OUTPATIENT)
Dept: PHYSICAL THERAPY | Facility: CLINIC | Age: 41
End: 2021-04-20

## 2021-04-20 NOTE — PROGRESS NOTES
Discharge Summary  Discharge Summary from Physical Therapy Report      Dates  PT visit: 8/13/21-2/16/21  Number of Visits: 33     Discharge Status of Patient: Patient did not return for ongoing care and did not return phone calls for follow-up.  Will discharge at this time.    Goals: Partially Met    Discharge Plan: Continue with current home exercise program as instructed  Patient to return to referring/providing physician    Date of Discharge 4/20/21        Katy Pina, PT  Physical Therapist

## 2021-06-28 ENCOUNTER — TELEPHONE (OUTPATIENT)
Dept: ONCOLOGY | Facility: CLINIC | Age: 41
End: 2021-06-28

## 2021-06-28 NOTE — TELEPHONE ENCOUNTER
Caller: Jacqueline Coronel    Relationship to patient: Self    Best call back number: [phone]    Chief complaint: PT NEEDS TO RESCHEDULE    Type of visit: FOLLOW UP    Requested date: FRIDAYS,     If rescheduling, when is the original appointment: 06/29    Additional notes:

## 2021-06-29 ENCOUNTER — APPOINTMENT (OUTPATIENT)
Dept: ONCOLOGY | Facility: HOSPITAL | Age: 41
End: 2021-06-29

## 2021-06-29 ENCOUNTER — APPOINTMENT (OUTPATIENT)
Dept: OTHER | Facility: HOSPITAL | Age: 41
End: 2021-06-29

## 2021-07-09 ENCOUNTER — APPOINTMENT (OUTPATIENT)
Dept: ONCOLOGY | Facility: HOSPITAL | Age: 41
End: 2021-07-09

## 2021-07-09 ENCOUNTER — APPOINTMENT (OUTPATIENT)
Dept: OTHER | Facility: HOSPITAL | Age: 41
End: 2021-07-09

## 2021-09-02 ENCOUNTER — LAB (OUTPATIENT)
Dept: LAB | Facility: HOSPITAL | Age: 41
End: 2021-09-02

## 2021-09-02 ENCOUNTER — HOSPITAL ENCOUNTER (OUTPATIENT)
Dept: CARDIOLOGY | Facility: HOSPITAL | Age: 41
Discharge: HOME | End: 2021-09-02

## 2021-09-02 LAB
ABO GROUP BLD: NORMAL
ANION GAP SERPL CALCULATED.3IONS-SCNC: 5.5 MMOL/L (ref 5–15)
BASOPHILS # BLD AUTO: 0.04 10*3/MM3 (ref 0–0.2)
BASOPHILS NFR BLD AUTO: 0.8 % (ref 0–1.5)
BLD GP AB SCN SERPL QL: NEGATIVE
BUN SERPL-MCNC: 12 MG/DL (ref 6–20)
BUN/CREAT SERPL: 15.4 (ref 7–25)
CALCIUM SPEC-SCNC: 9 MG/DL (ref 8.6–10.5)
CHLORIDE SERPL-SCNC: 104 MMOL/L (ref 98–107)
CO2 SERPL-SCNC: 27.5 MMOL/L (ref 22–29)
CREAT SERPL-MCNC: 0.78 MG/DL (ref 0.57–1)
DEPRECATED RDW RBC AUTO: 44.2 FL (ref 37–54)
EOSINOPHIL # BLD AUTO: 0.24 10*3/MM3 (ref 0–0.4)
EOSINOPHIL NFR BLD AUTO: 4.9 % (ref 0.3–6.2)
ERYTHROCYTE [DISTWIDTH] IN BLOOD BY AUTOMATED COUNT: 13.5 % (ref 12.3–15.4)
GFR SERPL CREATININE-BSD FRML MDRD: 82 ML/MIN/1.73
GLUCOSE SERPL-MCNC: 88 MG/DL (ref 65–99)
HCT VFR BLD AUTO: 39.8 % (ref 34–46.6)
HGB BLD-MCNC: 13.5 G/DL (ref 12–15.9)
IMM GRANULOCYTES # BLD AUTO: 0.02 10*3/MM3 (ref 0–0.05)
IMM GRANULOCYTES NFR BLD AUTO: 0.4 % (ref 0–0.5)
LYMPHOCYTES # BLD AUTO: 1.34 10*3/MM3 (ref 0.7–3.1)
LYMPHOCYTES NFR BLD AUTO: 27.5 % (ref 19.6–45.3)
MCH RBC QN AUTO: 30.4 PG (ref 26.6–33)
MCHC RBC AUTO-ENTMCNC: 33.9 G/DL (ref 31.5–35.7)
MCV RBC AUTO: 89.6 FL (ref 79–97)
MONOCYTES # BLD AUTO: 0.4 10*3/MM3 (ref 0.1–0.9)
MONOCYTES NFR BLD AUTO: 8.2 % (ref 5–12)
NEUTROPHILS NFR BLD AUTO: 2.84 10*3/MM3 (ref 1.7–7)
NEUTROPHILS NFR BLD AUTO: 58.2 % (ref 42.7–76)
NRBC BLD AUTO-RTO: 0.2 /100 WBC (ref 0–0.2)
PLATELET # BLD AUTO: 225 10*3/MM3 (ref 140–450)
PMV BLD AUTO: 11 FL (ref 6–12)
POTASSIUM SERPL-SCNC: 4.6 MMOL/L (ref 3.5–5.2)
RBC # BLD AUTO: 4.44 10*6/MM3 (ref 3.77–5.28)
RH BLD: POSITIVE
SODIUM SERPL-SCNC: 137 MMOL/L (ref 136–145)
T&S EXPIRATION DATE: NORMAL
WBC # BLD AUTO: 4.88 10*3/MM3 (ref 3.4–10.8)

## 2021-09-02 PROCEDURE — 86901 BLOOD TYPING SEROLOGIC RH(D): CPT

## 2021-09-02 PROCEDURE — 85025 COMPLETE CBC W/AUTO DIFF WBC: CPT

## 2021-09-02 PROCEDURE — 93005 ELECTROCARDIOGRAM TRACING: CPT | Performed by: OBSTETRICS & GYNECOLOGY

## 2021-09-02 PROCEDURE — 86850 RBC ANTIBODY SCREEN: CPT

## 2021-09-02 PROCEDURE — 80048 BASIC METABOLIC PNL TOTAL CA: CPT

## 2021-09-02 PROCEDURE — 93010 ELECTROCARDIOGRAM REPORT: CPT | Performed by: INTERNAL MEDICINE

## 2021-09-02 PROCEDURE — 86900 BLOOD TYPING SEROLOGIC ABO: CPT

## 2021-09-03 LAB — QT INTERVAL: 404 MS

## 2021-09-07 ENCOUNTER — LAB (OUTPATIENT)
Dept: LAB | Facility: HOSPITAL | Age: 41
End: 2021-09-07

## 2021-09-07 ENCOUNTER — APPOINTMENT (OUTPATIENT)
Dept: LAB | Facility: HOSPITAL | Age: 41
End: 2021-09-07

## 2021-09-07 PROCEDURE — U0004 COV-19 TEST NON-CDC HGH THRU: HCPCS

## 2021-09-07 PROCEDURE — C9803 HOPD COVID-19 SPEC COLLECT: HCPCS

## 2021-09-08 LAB — SARS-COV-2 ORF1AB RESP QL NAA+PROBE: DETECTED

## 2021-09-21 RX ORDER — TRAMADOL HYDROCHLORIDE 50 MG/1
50 TABLET ORAL EVERY 4 HOURS PRN
COMMUNITY
End: 2022-07-19

## 2021-09-24 ENCOUNTER — LAB (OUTPATIENT)
Dept: LAB | Facility: HOSPITAL | Age: 41
End: 2021-09-24

## 2021-09-24 LAB
ABO GROUP BLD: NORMAL
ANION GAP SERPL CALCULATED.3IONS-SCNC: 10 MMOL/L (ref 5–15)
BLD GP AB SCN SERPL QL: NEGATIVE
BUN SERPL-MCNC: 13 MG/DL (ref 6–20)
BUN/CREAT SERPL: 17.6 (ref 7–25)
CALCIUM SPEC-SCNC: 8.9 MG/DL (ref 8.6–10.5)
CHLORIDE SERPL-SCNC: 103 MMOL/L (ref 98–107)
CO2 SERPL-SCNC: 27 MMOL/L (ref 22–29)
CREAT SERPL-MCNC: 0.74 MG/DL (ref 0.57–1)
GFR SERPL CREATININE-BSD FRML MDRD: 87 ML/MIN/1.73
GLUCOSE SERPL-MCNC: 84 MG/DL (ref 65–99)
POTASSIUM SERPL-SCNC: 5.8 MMOL/L (ref 3.5–5.2)
RH BLD: POSITIVE
SODIUM SERPL-SCNC: 140 MMOL/L (ref 136–145)
T&S EXPIRATION DATE: NORMAL

## 2021-09-24 PROCEDURE — 80048 BASIC METABOLIC PNL TOTAL CA: CPT

## 2021-09-24 PROCEDURE — 86900 BLOOD TYPING SEROLOGIC ABO: CPT

## 2021-09-24 PROCEDURE — 86850 RBC ANTIBODY SCREEN: CPT

## 2021-09-24 PROCEDURE — 86901 BLOOD TYPING SEROLOGIC RH(D): CPT

## 2021-09-29 ENCOUNTER — PREP FOR SURGERY (OUTPATIENT)
Dept: OTHER | Facility: HOSPITAL | Age: 41
End: 2021-09-29

## 2021-09-29 DIAGNOSIS — N94.6 DYSMENORRHEA: Primary | ICD-10-CM

## 2021-09-29 RX ORDER — SODIUM CHLORIDE 0.9 % (FLUSH) 0.9 %
3 SYRINGE (ML) INJECTION EVERY 12 HOURS SCHEDULED
Status: CANCELLED | OUTPATIENT
Start: 2021-09-29

## 2021-09-29 RX ORDER — SODIUM CHLORIDE 0.9 % (FLUSH) 0.9 %
3-10 SYRINGE (ML) INJECTION AS NEEDED
Status: CANCELLED | OUTPATIENT
Start: 2021-09-29

## 2021-09-29 RX ORDER — SODIUM CHLORIDE, SODIUM LACTATE, POTASSIUM CHLORIDE, CALCIUM CHLORIDE 600; 310; 30; 20 MG/100ML; MG/100ML; MG/100ML; MG/100ML
125 INJECTION, SOLUTION INTRAVENOUS CONTINUOUS
Status: CANCELLED | OUTPATIENT
Start: 2021-09-29

## 2021-09-30 ENCOUNTER — HOSPITAL ENCOUNTER (OUTPATIENT)
Facility: HOSPITAL | Age: 41
Discharge: HOME | End: 2021-09-30
Attending: OBSTETRICS & GYNECOLOGY | Admitting: OBSTETRICS & GYNECOLOGY

## 2021-09-30 ENCOUNTER — ANESTHESIA EVENT (OUTPATIENT)
Dept: PERIOP | Facility: HOSPITAL | Age: 41
End: 2021-09-30

## 2021-09-30 ENCOUNTER — ANESTHESIA (OUTPATIENT)
Dept: PERIOP | Facility: HOSPITAL | Age: 41
End: 2021-09-30

## 2021-09-30 VITALS
HEART RATE: 66 BPM | TEMPERATURE: 97.5 F | BODY MASS INDEX: 23.07 KG/M2 | SYSTOLIC BLOOD PRESSURE: 109 MMHG | HEIGHT: 67 IN | DIASTOLIC BLOOD PRESSURE: 73 MMHG | WEIGHT: 147 LBS | OXYGEN SATURATION: 94 % | RESPIRATION RATE: 16 BRPM

## 2021-09-30 DIAGNOSIS — N93.9 ABNORMAL UTERINE BLEEDING: ICD-10-CM

## 2021-09-30 DIAGNOSIS — R10.84 GENERALIZED ABDOMINAL PAIN: Primary | ICD-10-CM

## 2021-09-30 LAB
B-HCG UR QL: NEGATIVE
POTASSIUM SERPL-SCNC: 4 MMOL/L (ref 3.5–5.2)

## 2021-09-30 PROCEDURE — 25010000002 FUROSEMIDE PER 20 MG

## 2021-09-30 PROCEDURE — 88307 TISSUE EXAM BY PATHOLOGIST: CPT | Performed by: OBSTETRICS & GYNECOLOGY

## 2021-09-30 PROCEDURE — 25010000002 HYDROMORPHONE PER 4 MG: Performed by: ANESTHESIOLOGY

## 2021-09-30 PROCEDURE — 25010000002 PROPOFOL 10 MG/ML EMULSION

## 2021-09-30 PROCEDURE — 25010000002 FENTANYL CITRATE (PF) 100 MCG/2ML SOLUTION

## 2021-09-30 PROCEDURE — 25010000002 DEXAMETHASONE PER 1 MG

## 2021-09-30 PROCEDURE — 25010000002 HYDROMORPHONE PER 4 MG

## 2021-09-30 PROCEDURE — 25010000002 KETOROLAC TROMETHAMINE PER 15 MG

## 2021-09-30 PROCEDURE — 84132 ASSAY OF SERUM POTASSIUM: CPT | Performed by: OBSTETRICS & GYNECOLOGY

## 2021-09-30 PROCEDURE — 25010000002 MIDAZOLAM PER 1 MG

## 2021-09-30 PROCEDURE — 81025 URINE PREGNANCY TEST: CPT | Performed by: OBSTETRICS & GYNECOLOGY

## 2021-09-30 PROCEDURE — C1889 IMPLANT/INSERT DEVICE, NOC: HCPCS | Performed by: OBSTETRICS & GYNECOLOGY

## 2021-09-30 PROCEDURE — 25010000002 ONDANSETRON PER 1 MG

## 2021-09-30 DEVICE — ABSORBABLE HEMOSTAT (OXIDIZED REGENERATED CELLULOSE, U.S.P.)
Type: IMPLANTABLE DEVICE | Site: ABDOMEN | Status: FUNCTIONAL
Brand: SURGICEL FIBRILLAR

## 2021-09-30 DEVICE — CLIP LIG HEMOLOK PA LG 6CT PRP: Type: IMPLANTABLE DEVICE | Site: ABDOMEN | Status: FUNCTIONAL

## 2021-09-30 RX ORDER — BUPIVACAINE HYDROCHLORIDE 2.5 MG/ML
INJECTION, SOLUTION EPIDURAL; INFILTRATION; INTRACAUDAL AS NEEDED
Status: DISCONTINUED | OUTPATIENT
Start: 2021-09-30 | End: 2021-09-30 | Stop reason: HOSPADM

## 2021-09-30 RX ORDER — SODIUM CHLORIDE 0.9 % (FLUSH) 0.9 %
10 SYRINGE (ML) INJECTION AS NEEDED
Status: DISCONTINUED | OUTPATIENT
Start: 2021-09-30 | End: 2021-09-30 | Stop reason: HOSPADM

## 2021-09-30 RX ORDER — LABETALOL HYDROCHLORIDE 5 MG/ML
5 INJECTION, SOLUTION INTRAVENOUS
Status: DISCONTINUED | OUTPATIENT
Start: 2021-09-30 | End: 2021-09-30 | Stop reason: HOSPADM

## 2021-09-30 RX ORDER — SODIUM CHLORIDE 0.9 % (FLUSH) 0.9 %
3-10 SYRINGE (ML) INJECTION AS NEEDED
Status: DISCONTINUED | OUTPATIENT
Start: 2021-09-30 | End: 2021-09-30 | Stop reason: HOSPADM

## 2021-09-30 RX ORDER — HYDROMORPHONE HCL 110MG/55ML
PATIENT CONTROLLED ANALGESIA SYRINGE INTRAVENOUS AS NEEDED
Status: DISCONTINUED | OUTPATIENT
Start: 2021-09-30 | End: 2021-09-30 | Stop reason: SURG

## 2021-09-30 RX ORDER — SODIUM CHLORIDE, SODIUM LACTATE, POTASSIUM CHLORIDE, CALCIUM CHLORIDE 600; 310; 30; 20 MG/100ML; MG/100ML; MG/100ML; MG/100ML
INJECTION, SOLUTION INTRAVENOUS CONTINUOUS PRN
Status: DISCONTINUED | OUTPATIENT
Start: 2021-09-30 | End: 2021-09-30 | Stop reason: SURG

## 2021-09-30 RX ORDER — MIDAZOLAM HYDROCHLORIDE 1 MG/ML
INJECTION INTRAMUSCULAR; INTRAVENOUS AS NEEDED
Status: DISCONTINUED | OUTPATIENT
Start: 2021-09-30 | End: 2021-09-30 | Stop reason: SURG

## 2021-09-30 RX ORDER — ALBUTEROL SULFATE 2.5 MG/3ML
2.5 SOLUTION RESPIRATORY (INHALATION) ONCE AS NEEDED
Status: DISCONTINUED | OUTPATIENT
Start: 2021-09-30 | End: 2021-09-30 | Stop reason: HOSPADM

## 2021-09-30 RX ORDER — ACETAMINOPHEN 325 MG/1
650 TABLET ORAL ONCE AS NEEDED
Status: DISCONTINUED | OUTPATIENT
Start: 2021-09-30 | End: 2021-09-30 | Stop reason: HOSPADM

## 2021-09-30 RX ORDER — ACETAMINOPHEN 650 MG/1
650 SUPPOSITORY RECTAL ONCE AS NEEDED
Status: DISCONTINUED | OUTPATIENT
Start: 2021-09-30 | End: 2021-09-30 | Stop reason: HOSPADM

## 2021-09-30 RX ORDER — NALOXONE HCL 0.4 MG/ML
0.4 VIAL (ML) INJECTION AS NEEDED
Status: DISCONTINUED | OUTPATIENT
Start: 2021-09-30 | End: 2021-09-30 | Stop reason: HOSPADM

## 2021-09-30 RX ORDER — FENTANYL CITRATE 50 UG/ML
50 INJECTION, SOLUTION INTRAMUSCULAR; INTRAVENOUS
Status: DISCONTINUED | OUTPATIENT
Start: 2021-09-30 | End: 2021-09-30 | Stop reason: HOSPADM

## 2021-09-30 RX ORDER — DEXAMETHASONE SODIUM PHOSPHATE 4 MG/ML
INJECTION, SOLUTION INTRA-ARTICULAR; INTRALESIONAL; INTRAMUSCULAR; INTRAVENOUS; SOFT TISSUE AS NEEDED
Status: DISCONTINUED | OUTPATIENT
Start: 2021-09-30 | End: 2021-09-30 | Stop reason: SURG

## 2021-09-30 RX ORDER — PROPOFOL 10 MG/ML
VIAL (ML) INTRAVENOUS AS NEEDED
Status: DISCONTINUED | OUTPATIENT
Start: 2021-09-30 | End: 2021-09-30 | Stop reason: SURG

## 2021-09-30 RX ORDER — ROCURONIUM BROMIDE 10 MG/ML
INJECTION, SOLUTION INTRAVENOUS AS NEEDED
Status: DISCONTINUED | OUTPATIENT
Start: 2021-09-30 | End: 2021-09-30 | Stop reason: SURG

## 2021-09-30 RX ORDER — FENTANYL CITRATE 50 UG/ML
25 INJECTION, SOLUTION INTRAMUSCULAR; INTRAVENOUS
Status: DISCONTINUED | OUTPATIENT
Start: 2021-09-30 | End: 2021-09-30 | Stop reason: HOSPADM

## 2021-09-30 RX ORDER — HYDROMORPHONE HCL 110MG/55ML
1 PATIENT CONTROLLED ANALGESIA SYRINGE INTRAVENOUS ONCE
Status: COMPLETED | OUTPATIENT
Start: 2021-09-30 | End: 2021-09-30

## 2021-09-30 RX ORDER — ONDANSETRON 2 MG/ML
INJECTION INTRAMUSCULAR; INTRAVENOUS AS NEEDED
Status: DISCONTINUED | OUTPATIENT
Start: 2021-09-30 | End: 2021-09-30 | Stop reason: SURG

## 2021-09-30 RX ORDER — HYDROMORPHONE HCL 110MG/55ML
0.25 PATIENT CONTROLLED ANALGESIA SYRINGE INTRAVENOUS
Status: DISCONTINUED | OUTPATIENT
Start: 2021-09-30 | End: 2021-09-30 | Stop reason: HOSPADM

## 2021-09-30 RX ORDER — LIDOCAINE HYDROCHLORIDE 10 MG/ML
0.5 INJECTION, SOLUTION INFILTRATION; PERINEURAL ONCE AS NEEDED
Status: DISCONTINUED | OUTPATIENT
Start: 2021-09-30 | End: 2021-09-30 | Stop reason: HOSPADM

## 2021-09-30 RX ORDER — OXYCODONE HYDROCHLORIDE AND ACETAMINOPHEN 5; 325 MG/1; MG/1
1 TABLET ORAL EVERY 6 HOURS PRN
Qty: 30 TABLET | Refills: 0 | Status: SHIPPED | OUTPATIENT
Start: 2021-09-30 | End: 2022-07-19

## 2021-09-30 RX ORDER — LIDOCAINE HYDROCHLORIDE 20 MG/ML
INJECTION, SOLUTION EPIDURAL; INFILTRATION; INTRACAUDAL; PERINEURAL AS NEEDED
Status: DISCONTINUED | OUTPATIENT
Start: 2021-09-30 | End: 2021-09-30 | Stop reason: SURG

## 2021-09-30 RX ORDER — HYDROMORPHONE HCL 110MG/55ML
0.5 PATIENT CONTROLLED ANALGESIA SYRINGE INTRAVENOUS
Status: DISCONTINUED | OUTPATIENT
Start: 2021-09-30 | End: 2021-09-30 | Stop reason: HOSPADM

## 2021-09-30 RX ORDER — KETOROLAC TROMETHAMINE 15 MG/ML
INJECTION, SOLUTION INTRAMUSCULAR; INTRAVENOUS AS NEEDED
Status: DISCONTINUED | OUTPATIENT
Start: 2021-09-30 | End: 2021-09-30 | Stop reason: SURG

## 2021-09-30 RX ORDER — SODIUM CHLORIDE 0.9 % (FLUSH) 0.9 %
3 SYRINGE (ML) INJECTION EVERY 12 HOURS SCHEDULED
Status: DISCONTINUED | OUTPATIENT
Start: 2021-09-30 | End: 2021-09-30 | Stop reason: HOSPADM

## 2021-09-30 RX ORDER — SODIUM CHLORIDE, SODIUM LACTATE, POTASSIUM CHLORIDE, CALCIUM CHLORIDE 600; 310; 30; 20 MG/100ML; MG/100ML; MG/100ML; MG/100ML
125 INJECTION, SOLUTION INTRAVENOUS CONTINUOUS
Status: DISCONTINUED | OUTPATIENT
Start: 2021-09-30 | End: 2021-09-30 | Stop reason: HOSPADM

## 2021-09-30 RX ORDER — HYDROMORPHONE HCL 110MG/55ML
1 PATIENT CONTROLLED ANALGESIA SYRINGE INTRAVENOUS
Status: DISCONTINUED | OUTPATIENT
Start: 2021-09-30 | End: 2021-09-30 | Stop reason: HOSPADM

## 2021-09-30 RX ORDER — ONDANSETRON 2 MG/ML
4 INJECTION INTRAMUSCULAR; INTRAVENOUS ONCE AS NEEDED
Status: DISCONTINUED | OUTPATIENT
Start: 2021-09-30 | End: 2021-09-30 | Stop reason: HOSPADM

## 2021-09-30 RX ORDER — SCOLOPAMINE TRANSDERMAL SYSTEM 1 MG/1
1 PATCH, EXTENDED RELEASE TRANSDERMAL CONTINUOUS
Status: DISCONTINUED | OUTPATIENT
Start: 2021-09-30 | End: 2021-09-30 | Stop reason: HOSPADM

## 2021-09-30 RX ORDER — OXYCODONE HYDROCHLORIDE 5 MG/1
7.5 TABLET ORAL ONCE AS NEEDED
Status: DISCONTINUED | OUTPATIENT
Start: 2021-09-30 | End: 2021-09-30 | Stop reason: HOSPADM

## 2021-09-30 RX ORDER — SODIUM CHLORIDE, SODIUM LACTATE, POTASSIUM CHLORIDE, CALCIUM CHLORIDE 600; 310; 30; 20 MG/100ML; MG/100ML; MG/100ML; MG/100ML
1000 INJECTION, SOLUTION INTRAVENOUS CONTINUOUS
Status: DISCONTINUED | OUTPATIENT
Start: 2021-09-30 | End: 2021-09-30 | Stop reason: HOSPADM

## 2021-09-30 RX ORDER — BUTALBITAL, ACETAMINOPHEN AND CAFFEINE 50; 325; 40 MG/1; MG/1; MG/1
1 TABLET ORAL EVERY 4 HOURS PRN
Status: DISCONTINUED | OUTPATIENT
Start: 2021-09-30 | End: 2021-09-30 | Stop reason: HOSPADM

## 2021-09-30 RX ORDER — OXYCODONE HYDROCHLORIDE 5 MG/1
10 TABLET ORAL EVERY 4 HOURS PRN
Status: DISCONTINUED | OUTPATIENT
Start: 2021-09-30 | End: 2021-09-30 | Stop reason: HOSPADM

## 2021-09-30 RX ORDER — FUROSEMIDE 10 MG/ML
INJECTION INTRAMUSCULAR; INTRAVENOUS AS NEEDED
Status: DISCONTINUED | OUTPATIENT
Start: 2021-09-30 | End: 2021-09-30 | Stop reason: SURG

## 2021-09-30 RX ORDER — FENTANYL CITRATE 50 UG/ML
INJECTION, SOLUTION INTRAMUSCULAR; INTRAVENOUS AS NEEDED
Status: DISCONTINUED | OUTPATIENT
Start: 2021-09-30 | End: 2021-09-30 | Stop reason: SURG

## 2021-09-30 RX ORDER — INDOCYANINE GREEN AND WATER 25 MG
KIT INJECTION AS NEEDED
Status: DISCONTINUED | OUTPATIENT
Start: 2021-09-30 | End: 2021-09-30 | Stop reason: HOSPADM

## 2021-09-30 RX ADMIN — SCOLOPAMINE TRANSDERMAL SYSTEM 1 PATCH: 1 PATCH, EXTENDED RELEASE TRANSDERMAL at 09:23

## 2021-09-30 RX ADMIN — ROCURONIUM BROMIDE 20 MG: 10 INJECTION INTRAVENOUS at 10:40

## 2021-09-30 RX ADMIN — SODIUM CHLORIDE, SODIUM LACTATE, POTASSIUM CHLORIDE, AND CALCIUM CHLORIDE: .6; .31; .03; .02 INJECTION, SOLUTION INTRAVENOUS at 13:13

## 2021-09-30 RX ADMIN — HYDROMORPHONE HYDROCHLORIDE 0.5 MG: 2 INJECTION INTRAMUSCULAR; INTRAVENOUS; SUBCUTANEOUS at 14:21

## 2021-09-30 RX ADMIN — ROCURONIUM BROMIDE 20 MG: 10 INJECTION INTRAVENOUS at 12:05

## 2021-09-30 RX ADMIN — PROPOFOL 175 MCG/KG/MIN: 10 INJECTION, EMULSION INTRAVENOUS at 10:07

## 2021-09-30 RX ADMIN — ROCURONIUM BROMIDE 20 MG: 10 INJECTION INTRAVENOUS at 13:31

## 2021-09-30 RX ADMIN — FENTANYL CITRATE 100 MCG: 50 INJECTION, SOLUTION INTRAMUSCULAR; INTRAVENOUS at 10:00

## 2021-09-30 RX ADMIN — ROCURONIUM BROMIDE 20 MG: 10 INJECTION INTRAVENOUS at 11:26

## 2021-09-30 RX ADMIN — HYDROMORPHONE HYDROCHLORIDE 0.5 MG: 2 INJECTION, SOLUTION INTRAMUSCULAR; INTRAVENOUS; SUBCUTANEOUS at 15:02

## 2021-09-30 RX ADMIN — CEFAZOLIN SODIUM 2 G: 1 INJECTION, POWDER, FOR SOLUTION INTRAMUSCULAR; INTRAVENOUS at 10:02

## 2021-09-30 RX ADMIN — SODIUM CHLORIDE, SODIUM LACTATE, POTASSIUM CHLORIDE, AND CALCIUM CHLORIDE: .6; .31; .03; .02 INJECTION, SOLUTION INTRAVENOUS at 10:04

## 2021-09-30 RX ADMIN — ONDANSETRON 4 MG: 2 INJECTION INTRAMUSCULAR; INTRAVENOUS at 12:07

## 2021-09-30 RX ADMIN — MIDAZOLAM 2 MG: 1 INJECTION INTRAMUSCULAR; INTRAVENOUS at 09:49

## 2021-09-30 RX ADMIN — CEFAZOLIN SODIUM 2 G: 1 INJECTION, POWDER, FOR SOLUTION INTRAMUSCULAR; INTRAVENOUS at 14:02

## 2021-09-30 RX ADMIN — HYDROMORPHONE HYDROCHLORIDE 0.5 MG: 2 INJECTION, SOLUTION INTRAMUSCULAR; INTRAVENOUS; SUBCUTANEOUS at 14:38

## 2021-09-30 RX ADMIN — KETOROLAC TROMETHAMINE 15 MG: 15 INJECTION, SOLUTION INTRAMUSCULAR; INTRAVENOUS at 13:47

## 2021-09-30 RX ADMIN — HYDROMORPHONE HYDROCHLORIDE 0.5 MG: 2 INJECTION INTRAMUSCULAR; INTRAVENOUS; SUBCUTANEOUS at 10:38

## 2021-09-30 RX ADMIN — ROCURONIUM BROMIDE 20 MG: 10 INJECTION INTRAVENOUS at 13:44

## 2021-09-30 RX ADMIN — LIDOCAINE HYDROCHLORIDE 100 MG: 20 INJECTION, SOLUTION EPIDURAL; INFILTRATION; INTRACAUDAL; PERINEURAL at 10:00

## 2021-09-30 RX ADMIN — SUGAMMADEX 200 MG: 100 INJECTION, SOLUTION INTRAVENOUS at 14:15

## 2021-09-30 RX ADMIN — ROCURONIUM BROMIDE 10 MG: 10 INJECTION INTRAVENOUS at 10:59

## 2021-09-30 RX ADMIN — ROCURONIUM BROMIDE 60 MG: 10 INJECTION INTRAVENOUS at 10:00

## 2021-09-30 RX ADMIN — ROCURONIUM BROMIDE 10 MG: 10 INJECTION INTRAVENOUS at 13:07

## 2021-09-30 RX ADMIN — HYDROMORPHONE HYDROCHLORIDE 1 MG: 2 INJECTION, SOLUTION INTRAMUSCULAR; INTRAVENOUS; SUBCUTANEOUS at 09:23

## 2021-09-30 RX ADMIN — SODIUM CHLORIDE, POTASSIUM CHLORIDE, SODIUM LACTATE AND CALCIUM CHLORIDE 1000 ML: 600; 310; 30; 20 INJECTION, SOLUTION INTRAVENOUS at 08:59

## 2021-09-30 RX ADMIN — FUROSEMIDE 10 MG: 10 INJECTION, SOLUTION INTRAVENOUS at 14:05

## 2021-09-30 RX ADMIN — ROCURONIUM BROMIDE 20 MG: 10 INJECTION INTRAVENOUS at 12:53

## 2021-09-30 RX ADMIN — DEXAMETHASONE SODIUM PHOSPHATE 8 MG: 4 INJECTION, SOLUTION INTRAMUSCULAR; INTRAVENOUS at 10:26

## 2021-09-30 RX ADMIN — PROPOFOL 200 MG: 10 INJECTION, EMULSION INTRAVENOUS at 10:00

## 2021-09-30 RX ADMIN — FLUORESCEIN SODIUM 25 MG: 100 INJECTION INTRAVENOUS at 13:57

## 2021-09-30 NOTE — ANESTHESIA PREPROCEDURE EVALUATION
Anesthesia Evaluation     Patient summary reviewed and Nursing notes reviewed   history of anesthetic complications: PONV  NPO Solid Status: > 8 hours  NPO Liquid Status: > 8 hours           Airway   Mallampati: II  TM distance: >3 FB  Neck ROM: full  No difficulty expected  Dental - normal exam     Pulmonary - negative pulmonary ROS and normal exam   Cardiovascular - normal exam    (+) hypertension,       Neuro/Psych  (+) headaches,     GI/Hepatic/Renal/Endo - negative ROS     Musculoskeletal (-) negative ROS    Abdominal  - normal exam    Bowel sounds: normal.   Substance History - negative use     OB/GYN negative ob/gyn ROS         Other        ROS/Med Hx Other: hemochromatosis                  Anesthesia Plan    ASA 2     general     intravenous induction     Anesthetic plan, all risks, benefits, and alternatives have been provided, discussed and informed consent has been obtained with: patient.    Plan discussed with CAA.

## 2021-09-30 NOTE — ANESTHESIA PROCEDURE NOTES
Airway  Urgency: elective    Date/Time: 9/30/2021 10:02 AM  Airway not difficult    General Information and Staff    Patient location during procedure: OR  Anesthesiologist: Arden Vaughn MD  CRNA: Elizabeth Mauro AA    Indications and Patient Condition  Indications for airway management: airway protection    Preoxygenated: yes  Mask difficulty assessment: 1 - vent by mask    Final Airway Details  Final airway type: endotracheal airway      Successful airway: ETT  Cuffed: yes   Successful intubation technique: direct laryngoscopy  Facilitating devices/methods: intubating stylet and cricoid pressure  Endotracheal tube insertion site: oral  Blade: Adry  Blade size: 3  ETT size (mm): 7.0  Cormack-Lehane Classification: grade I - full view of glottis  Placement verified by: capnometry   Measured from: teeth  ETT/EBT  to teeth (cm): 21  Number of attempts at approach: 1  Assessment: lips, teeth, and gum same as pre-op and atraumatic intubation    Additional Comments  Intubation performed by MAKI Herrera

## 2021-09-30 NOTE — ANESTHESIA POSTPROCEDURE EVALUATION
Patient: Jacqueline Coronel    Procedure Summary     Date: 09/30/21 Room / Location: [facility] OR 08 / [facility] MAIN OR    Anesthesia Start: 0947 Anesthesia Stop: 1429    Procedures:       TOTAL LAPAROSCOPIC HYSTERECTOMY,  WITH DAVINCI ROBOT (N/A Abdomen)      CYSTOSCOPY (N/A Urethra) Diagnosis:       Abnormal uterine bleeding      (Abnormal uterine bleeding [N93.9])    Surgeons: Chioma Isabel MD Provider: Arden Vaughn MD    Anesthesia Type: general ASA Status: 2          Anesthesia Type: general    Vitals  Vitals Value Taken Time   /67 09/30/21 1437   Temp 97.2 °F (36.2 °C) 09/30/21 1425   Pulse 73 09/30/21 1439   Resp 17 09/30/21 1430   SpO2 99 % 09/30/21 1439   Vitals shown include unvalidated device data.        Post Anesthesia Care and Evaluation    Patient location during evaluation: PACU  Patient participation: complete - patient participated  Level of consciousness: awake  Pain scale: See nurse's notes for pain score.  Pain management: adequate  Airway patency: patent  Anesthetic complications: No anesthetic complications  PONV Status: none  Cardiovascular status: acceptable  Respiratory status: acceptable  Hydration status: acceptable    Comments: Patient seen and examined postoperatively; vital signs stable; SpO2 greater than or equal to 90%; cardiopulmonary status stable; nausea/vomiting adequately controlled; pain adequately controlled; no apparent anesthesia complications; patient discharged from anesthesia care when discharge criteria were met

## 2021-10-01 NOTE — OP NOTE
Subjective     Date of Service:  09/30/21    Pre-operative diagnosis(es): Dysmenorrhea, Chronic pelvic pain.  Known endometriosis.      Post-operative diagnosis(es): Same   Procedure(s): Total Robotic Hysterectomy     Antibiotics: cefazolin (Ancef) ordered on call to OR     Anesthesia:  General Anesthesia     Objective      Operative findings: Scarred bladder to EVA.  O/w normal pelvis.     Description of Procedure:   Full dictation to follow.     Specimens removed:   Uterus and cervix. (pt had undergone a previous bilateral salpingectomy)     Complications:   none     Condition:   stable     Disposition:   to PACU and then discharged home in stable condition.                 Chioma Isabel MD  09/30/21  22:02 EDT

## 2021-10-04 ENCOUNTER — DOCUMENTATION (OUTPATIENT)
Dept: OBSTETRICS AND GYNECOLOGY | Facility: HOSPITAL | Age: 41
End: 2021-10-04

## 2021-10-04 LAB
LAB AP CASE REPORT: NORMAL
PATH REPORT.FINAL DX SPEC: NORMAL
PATH REPORT.GROSS SPEC: NORMAL

## 2021-10-04 NOTE — OP NOTE
Subjective     Date of Servic:  21    Pre-operative diagnosis(es):  Dysmenorrhea, dyspareunia, pelvic pain.  Known history of endometriosis, unresponsive to medical management.  Enlarged uterus    Surgeon: Chioma Isabel MD    Assistant Natalee Atkins MD    Anesthesia Dr. Orozco with anesthesia Assistant     Post-operative diagnosis(es):  Same   Procedure(s):  Total robotic hysterectomy.  Cystoscopy.     Antibiotics:  Kefzol ordered on call to OR     Anesthesia:  General Anesthesia    Indications: Ms. Irby is a 41-year-old female with longstanding history of known endometriosis pain.  She has been on Orilissa for 6 some time but is not having adequate relief in her painful periods.  She is also experiencing dyspareunia, has an enlarged uterus and has had 2 previous  sections.  Options were discussed with the patient regarding management of her symptoms and she is requesting definitive surgical treatment with hysterectomy.     Objective      Operative findings:  Slightly enlarged uterus.  Surgically absent fallopian tubes.  Normal ovaries.  Dense adhesions between the lower uterine segment and the bladder.     Description of Procedure:   The patient was taken to the operating room with IV running.  She was positioned in the dorsal supine position while undergoing general endotracheal anesthesia which she tolerated well.  She was then repositioned in the dorsal lithotomy position with lower extremities in Main stirrups bilaterally.  She had been placed on the Merida guard to allow for steep Trendelenburg positioning.  This was tested and found to be adequate.  The patient underwent a vaginal and abdominal prep followed by sterile draping.  A timeout was performed.  An oral gastric tube was placed.  A Jenkins catheter was placed.  A Janessa uterine manipulator was placed with a size 6 tip and a small cup without difficulty.  Gloves were exchanged and attention was turned to the abdomen where a left upper  quadrant entry was conducted with a 5 mm laparoscope through an Optiview trocar.  Upon entry, the patient was insufflated with carbon dioxide to a total of 15 mmHg which she tolerated well.  She was then placed in steep Trendelenburg positioning.  The additional trochars were placed under direct visualization.  An 8 mm robotic trocar was placed supraumbilically, two 8 mm robotic trochars were placed in the right mid abdomen and one 8 mm robotic trocar was placed in the left mid abdomen.  A 10 mm assistance port was placed in the left lower quadrant.  The da Lexie XI robot was then docked and instruments were inserted.  Attention was turned to the pelvis.  There was no evidence of gross disease within the pelvis or abdomen.  Attention was turned to the where the round ligament was grasped, ligated and transected.  An attempted entry into the retroperitoneal space on the left side was conducted with careful dissection along the posterior leaf of the broad ligament.  Identification of the psoas muscle, external iliac vessels, superior vesicle artery were done.  The ureter was also identified.  The uterine artery was not clearly identified in this area as the tissue was not  easily most likely due to her endometriosis.  Therefore attention was turned to the right side.  The round ligament was grasped, ligated and transected.  Entry into the retroperitoneal space was conducted with careful dissection along the posterior leaf of the broad ligament.  Identification of the psoas muscle, external iliac vessels, superior vesicle artery, uterine artery and ureter was conducted with careful dissection.  The ureter was noted to have good peristalsis.  It was retracted medially.  A hemolock clip was placed across the uterine artery on the right side near its origin using Endo good indocyanine green fluorescent dye.  The fallopian tubes had surgically been removed previously.  On both sides, continued dissection along  the anterior leaf was conducted to create the bladder flap.  Using backfilling of the bladder, it was clear where the bladder began in the scar from the previous  sections occurred.  Using sharp and blunt dissection, a bladder flap was eventually created.  The bladder was dissected off the lower uterine segment and the cervix without complication.  The uterine vessels along the right and left sides of the uterus were then skeletonized, clamped, cauterized and transected with good hemostasis noted.  The utero-ovarian ligaments were grasped, ligated and transected.  Excellent hemostasis was noted.  Colpotomy was performed in a circumferential pattern starting anteriorly and finishing posteriorly until the entire specimen was amputated.  The specimen was then delivered through the vagina intact.  The vaginal cuff was closed with 5 separate figure-of-eight stitches of 0 Vicryl.  Good hemostasis was noted.  Cystoscopy was performed and there was good reflux of fluorescein dyed urine from both utero or 5 confirming their integrity.  Surgicel-like material was placed on the bladder, a small area to help with hemostasis.  The Jenkins catheter was not replaced following completion of the cystoscopy.    At this time, all robotic instruments were removed.  The robot was undocked and the patient was taken out of steep Trendelenburg positioning.  The patient was desufflated prior to removal of the trochars.  Incisions were closed with first 4-0 Monocryl.  Dermabond was also used to help reapproximate the skin edges.  Sponge lap and needle counts were correct x2.  The patient tolerated the procedure well and was extubated successfully in the operating room prior to transfer to the post operative area.    Drains packs use were none.  Anesthesia General tracheal anesthesia    Estimated blood loss 75 mL    Urine output 400 mL.     Specimens removed:   Uterus and cervix     Complications:   None     Condition:   stable      Disposition:   to PACU and then admit to  medical - surgical floor               Chioma Isabel MD  10/04/21  16:38 EDT

## 2021-11-01 ENCOUNTER — TRANSCRIBE ORDERS (OUTPATIENT)
Dept: ADMINISTRATIVE | Facility: HOSPITAL | Age: 41
End: 2021-11-01

## 2021-11-01 DIAGNOSIS — Z12.31 VISIT FOR SCREENING MAMMOGRAM: Primary | ICD-10-CM

## 2021-12-28 RX ORDER — BACLOFEN 10 MG/1
TABLET ORAL
Qty: 60 TABLET | Refills: 3 | Status: SHIPPED | OUTPATIENT
Start: 2021-12-28

## 2021-12-28 NOTE — TELEPHONE ENCOUNTER
Rx Refill Note  Requested Prescriptions     Pending Prescriptions Disp Refills   • baclofen (LIORESAL) 10 MG tablet [Pharmacy Med Name: BACLOFEN 10 MG TABLET] 60 tablet 3     Sig: TAKE 1 TABLET BY MOUTH THREE TIMES A DAY      Last office visit with prescribing clinician: 1/20/2021      Next office visit with prescribing clinician: Visit date not found            Ariana Crowe MA  12/28/21, 08:06 EST

## 2022-07-07 ENCOUNTER — TELEPHONE (OUTPATIENT)
Dept: NEUROSURGERY | Facility: CLINIC | Age: 42
End: 2022-07-07

## 2022-07-07 DIAGNOSIS — M50.123 CERVICAL DISC DISORDER AT C6-C7 LEVEL WITH RADICULOPATHY: ICD-10-CM

## 2022-07-07 DIAGNOSIS — M50.20 CERVICAL DISC HERNIATION: Primary | ICD-10-CM

## 2022-07-07 NOTE — TELEPHONE ENCOUNTER
Provider:MEG  Caller: CAROLYN  Relationship to Patient: SELF    Phone Number: [phone]  Reason for Call: LEFT ARM AND NECK PAIN   When was the patient last seen: 1/20/2021  When did it start: 10 DAYS AGO   Where is it located: LEFT ARM AND NECK   Characteristics of symptom/severity:7/10  Timing- Is it constant or intermittent: CONSTANT   What makes it worse: LAYING DOWN TO SLEEP   What makes it better: SOME TYLENOL/ADVIL   What therapies/medications have you tried:     PT STATED THAT 10 DAYS AGO SHE STARTED TO HAVE SHARP PAINS IN HER SHOULDER BLADE AND UNDER ARM AND AROUND THE DELTOID MUSCLE. SHE IS UNABLE TO SLEEP AT NIGHT WITHOUT CRYING IN PAIN AND NOTHING HAS REALLY BEEN HELPING HER.     SHE DID GO YESTERDAY AND COMPLETED AN MRI OF HER NECK AT [facility]     I DID CALL [facility] AND SPOKE TO SHREYA SHE SAID THAT REPORT IS NOT READY AT THIS TIME BUT WHEN IT IS READY SHE WILL FAX IT [phone].     PT DOES HAVE A COPY ON A DISC.     PT IS WANTING AN APPT AND OR ADVISE DUE TO HER SYMPTOMS AND PAIN SHE IS IN     THANK YOU

## 2022-07-08 NOTE — TELEPHONE ENCOUNTER
Caller: Jacqueline Coronel    Relationship: Self    Best call back number:[phone]    What is the best time to reach you:ANYTIME    Who are you requesting to speak with (clinical staff, provider,  specific staff member): CLINICAL STAFF    Do you know the name of the person who called: NA    What was the call regarding:PT CALLED BACK TO CHECK STATUS OF PREV. MESSAGE-ADVISED PT THAT WE ARE WAITING FOR  TO REVIEW-PT STATES THAT SHE IS HURTING BADLY-PT ALSO EXPRESSED THAT SHE IS CONCERNED DUE TO PREV. SURGERY-PT STATES THAT SHE HAS PTSD FROM BEFORE WITH HER NECK-ADVISING OFFICE THAT PT CALLED BACK TO CHECK ON APPT. PT IS ASKING TO BE SEEN ASAP    Do you require a callback:YES

## 2022-07-11 ENCOUNTER — APPOINTMENT (OUTPATIENT)
Dept: GENERAL RADIOLOGY | Facility: HOSPITAL | Age: 42
End: 2022-07-11

## 2022-07-11 ENCOUNTER — HOSPITAL ENCOUNTER (OUTPATIENT)
Dept: GENERAL RADIOLOGY | Facility: HOSPITAL | Age: 42
Discharge: HOME | End: 2022-07-11
Admitting: NEUROLOGICAL SURGERY

## 2022-07-11 DIAGNOSIS — M50.20 CERVICAL DISC HERNIATION: ICD-10-CM

## 2022-07-11 DIAGNOSIS — M50.123 CERVICAL DISC DISORDER AT C6-C7 LEVEL WITH RADICULOPATHY: ICD-10-CM

## 2022-07-11 PROCEDURE — 72050 X-RAY EXAM NECK SPINE 4/5VWS: CPT

## 2022-07-11 NOTE — TELEPHONE ENCOUNTER
"Per Dr. Muñoz, \"Has a new cervical disc herniation at C4-C5.  See if you can bring her in this coming week to see me face-to-face.  Try and get AP/lateral/flexion/extension cervical x-rays before hand.\"    I called and advised patient of XR order and appt on Wednesday at 0945, she voiced understanding.   "

## 2022-07-13 ENCOUNTER — TELEPHONE (OUTPATIENT)
Dept: NEUROSURGERY | Facility: CLINIC | Age: 42
End: 2022-07-13

## 2022-07-13 NOTE — TELEPHONE ENCOUNTER
"Upon request from Alina, I went to the window and discussed with the patient's sister the situation at hand a third time. I explained that there was nothing against her or her sister, it was not personal, it is simply office policy to reschedule a patient if they are greater than 15 minutes late for their appointment. While Dr. Muñoz did want to see the patient sooner rather than later, he did have other patients to see and she was simply 25 minutes late and I would have to talk to Dr. Muñoz in regards to moving her appointment up but we had other patients in the office waiting to see him and I could not interrupt his clinic. I advised the patient's sister that there were two options, the first was a recommendation that if her pain is that bad then she should go to the emergency room for evaluation. The sister's response was \"well what are they going to do for her?\" I replied with, \"I cannot say for certain what they are going to do as they will need to first evaluate her.\" The patient's sister was upset because the patient was in pain. I voiced understanding but I could not move her appointment right now until I spoke with Dr. Muñoz. I advised that I would call the patient after speaking with Dr. Muñoz. The patient's sister left the office.     About 10 minutes or so later, the patient calls the office looking for advice due to her pain. I advised her that right now my recommendation is to go to the ER if the pain is unbearable. She stated, \"I am not going to go to the ER as they are just going to load me up with pain medicine.\" I voiced understanding and advised that until I talk to Dr. Muñoz I have no where to move her appt to. She voiced understanding and I told her that I would call her back with a new appointment.     After speaking with Dr. Muñoz I did advise the patient that he would see her on Friday at 3pm. She was thankful for the appointment and stated she would be here. She " was very apologetic for the way that she acted because of the events that transpired. I advised that we do understand that she is in pain and was frustrated but that was office policy. She continued to be apologetic on the phone with me and gave apologies to Alina, which I relayed to Alina. At the conclusion of the phone call with the patient I did ask her to please arrive at [phone] for her appointment on Friday as she does have paperwork to fill out as it has been quite some time since she has been seen in the office. She voiced understanding and stated she would be here.

## 2022-07-13 NOTE — TELEPHONE ENCOUNTER
"PT ARRIVED 25 MIN LATE FOR APPT, I ADVISED PT THAT THE OFFICE HAS A 15MIN LATE POLICY BEFORE APPT IS CANCELED.  THE PT WAS AGITATED WHEN SHE CAME IN AND AFTER BEING TOLD THAT THE APPT WAS CANCELED SHE BECAME VERBALLY ABUSIVE AND TELLING ME IT WAS AN \"EMERGENCY\" AND THAT SHE WANTED TO SEE THE DR.  I ADVISED THE PT THAT THE DR WAS BUSY IN A ROOM WITH ANOTHER PT.  SHE THEN STARTED SCREAMING AT ME, \"I TRIED TO CALL 10 TIMES\"  I APOLOGIZED AGAIN, AND ADVISED THAT EVEN IF SHE HAD CALLED SHE WOULD HAVE BEEN TOLD THAT AT 15 MIN THE APPT WILL BE RESCHEDULED.    SHE THEN YELLED \"YOU ARE NOT HELPING ME, I WANT TO TALK TO THE DR\"    SHE BANGED HER HAND ON THE  SHELF OF WINDOW, DEMANDING TO TALK TO THE DR.    I ONCE AGAIN TOLD HER THAT HE WAS WITH A PT AND NOT AVAILABLE.    I TOLD HER THAT I CAN RESCHEDULE FOR THE NEXT SOONEST APPT. SHE REPLIED \" I CANT WAIT, I NEED TO SEE HIM TODAY, HE SAID I HAVE TO BE SEEN TODAY, BECAUSE HE LOOKED AT MY MRI\"    ONCE AGAIN I TOLD HER IT WAS OUT OF MY HANDS, AND I WOULD BE HAPPY TO RESCHEDULE HER.  SHE WALKED AWAY, I RESCHEDULED HER AND ASKED IF THAT WAS OK?  SHE SAID IT WOULD HAVE TO BE.    ABOUT 10 MIN LATER, ANOTHER LADY APPROACHED MY WINDOW.    SHE TOLD ME THAT SHE WAS THE PTS SISTER AND ASKED ME WHAT HAD HAPPENED.    I EXPLAINED AGAIN THAT THE PT WAS 25 MIN LATE AND THAT THE OFFICE HAS A 15 MIN POLICY DUE TO THE HIGH NUMBER OF PTS SEEN.   SHE GOT VERY ANGRY, I EXPLAINED THAT I RESCHEDULED THE PT TO OCT3  HER REPLY \"OH NO, THAT'S NOT GONNA WORK, SHE IS IN PAIN, SHE CANNOT WAIT. I WANT TO TALK TO THE DR\"    ONCE AGAIN, I EXPLAINED THAT THE DR WAS WITH A PT.    HER REPLY \" YOU ARE ENJOYING THIS, YOU ARE SMILING\"    I REPLIED \" MAAM I HAVE A MASK ON, THERE IS NO WAY YOU CAN KNOW THAT, I AM DOING WHAT I WAS INSTRUCTED TO\"     SHE YELLED \"SHE IS IN PAIN, I WANT TO TALK TO THE DR\"    I AGAIN APOLOGIZED AND TOLD HER THAT WASN'T POSSIBLE, AS HE WAS WITH A PT    HER  YELLING REPLY \" SHE IS IN PAIN, " "SHE NEEDS TO BE SEEN NOW\"    I REPLIED \" CHANTEL, EVERYONE WHO COMES IN, IS IN PAIN, DR WEAVER IS A VERY BUSY DR AND HAS 30 PTS TODAY, HIS SCHEDULE IS VERY TIGHT,DUE TO THIS WE UNFORTUNATELY HAVE TO MOVE ON PAST LATE PTS, THIS IS THE OFFICE POLICY\"     HER YELLING REPLY \" YOU NEED TO TAKE PT CARE AND TIME MORE PERSONALLY, BECAUSE SHE IS IN MORE PAIN THAN ANYONE ELSE\":   \"I WANT TO TO TALK TO THE DR, YOU DONT CARE\"    I THEN CALLED BRANDON ABRAHAM UP TO EXPLAIN THAT IT WAS OFFICE POLICY AND THAT BRANDON WOULD HELP.  "

## 2022-07-15 ENCOUNTER — OFFICE VISIT (OUTPATIENT)
Dept: NEUROSURGERY | Facility: CLINIC | Age: 42
End: 2022-07-15

## 2022-07-15 VITALS
BODY MASS INDEX: 23.08 KG/M2 | HEIGHT: 67 IN | TEMPERATURE: 97.8 F | HEART RATE: 88 BPM | OXYGEN SATURATION: 96 % | DIASTOLIC BLOOD PRESSURE: 82 MMHG | SYSTOLIC BLOOD PRESSURE: 110 MMHG | WEIGHT: 147.05 LBS

## 2022-07-15 DIAGNOSIS — Z98.1 HISTORY OF SPINAL FUSION: ICD-10-CM

## 2022-07-15 DIAGNOSIS — M50.121 CERVICAL DISC DISORDER AT C4-C5 LEVEL WITH RADICULOPATHY: Primary | ICD-10-CM

## 2022-07-15 DIAGNOSIS — R29.898 LEFT ARM WEAKNESS: ICD-10-CM

## 2022-07-15 PROCEDURE — 99214 OFFICE O/P EST MOD 30 MIN: CPT | Performed by: NEUROLOGICAL SURGERY

## 2022-07-15 RX ORDER — HYDROCODONE BITARTRATE AND ACETAMINOPHEN 5; 325 MG/1; MG/1
1 TABLET ORAL EVERY 6 HOURS PRN
Qty: 40 TABLET | Refills: 0 | Status: SHIPPED | OUTPATIENT
Start: 2022-07-15 | End: 2022-07-22 | Stop reason: HOSPADM

## 2022-07-15 RX ORDER — CODEINE SULFATE 30 MG/1
30 TABLET ORAL EVERY 6 HOURS PRN
COMMUNITY
End: 2022-07-19

## 2022-07-15 NOTE — PROGRESS NOTES
Subjective   Patient ID: Jacqueline Coronel is a 42 y.o. female is here today for follow-up for neck pain with imaging.    Ms. Coronel reports neck pain right around her shoulder and down her left arm. She states that she is not able to lift her left arm. She reports numbness and tingling in her right hand.     She had an ACDF at C6-C7 a few years ago and did well although the recovery was slow.  About 2 weeks ago she began having severe recurrent pain in the left shoulder and deltoid and weakness.  She does have some persistent weakness now although she is a little bit better than before but had movements can aggravate the pain and she does have a Spurling sign.  She does have a fairly substantial adjacent level cervical disc herniation at C4-C5.  I do not think she could tolerate physical therapy and blocks are probably not going to be helpful.  Like last time I think we need to move forward and do an ACDF at C4-C5.  We will do this next week.  The goals risks and benefits are described below.  She has an autistic son who requires a lot of care but she has some family support to help her.        Neck Pain   This is a recurrent problem. The current episode started 1 to 4 weeks ago. The problem occurs constantly. The problem has been gradually worsening. The pain is present in the left side. The quality of the pain is described as aching. The pain is at a severity of 7/10. The pain is moderate. The symptoms are aggravated by position, bending and twisting. Associated symptoms include numbness and tingling. Pertinent negatives include no fever or weakness. She has tried oral narcotics and acetaminophen for the symptoms. The treatment provided mild relief.       The following portions of the patient's history were reviewed and updated as appropriate: allergies, current medications, past family history, past medical history, past social history, past surgical history and problem list.    Review of Systems   Constitutional:  "Negative for activity change and fever.   Musculoskeletal: Positive for neck pain.   Neurological: Positive for tingling and numbness. Negative for weakness.   Psychiatric/Behavioral: Positive for sleep disturbance.   All other systems reviewed and are negative.          Objective     Vitals:    07/15/22 1625   BP: 110/82   Pulse: 88   Temp: 97.8 °F (36.6 °C)   SpO2: 96%   Weight: 66.7 kg (147 lb 0.8 oz)   Height: 170.2 cm (67\")     Body mass index is 23.03 kg/m².      Physical Exam  Constitutional:       Appearance: She is well-developed.   HENT:      Head: Normocephalic and atraumatic.   Eyes:      Extraocular Movements: EOM normal.      Conjunctiva/sclera: Conjunctivae normal.      Pupils: Pupils are equal, round, and reactive to light.   Neck:      Vascular: No carotid bruit.   Neurological:      Mental Status: She is oriented to person, place, and time.      Coordination: Finger-Nose-Finger Test and Heel to Shin Test normal.      Gait: Gait is intact.      Deep Tendon Reflexes:      Reflex Scores:       Tricep reflexes are 2+ on the right side and 2+ on the left side.       Bicep reflexes are 2+ on the right side and 2+ on the left side.       Brachioradialis reflexes are 2+ on the right side and 2+ on the left side.       Patellar reflexes are 2+ on the right side and 2+ on the left side.       Achilles reflexes are 2+ on the right side and 2+ on the left side.  Psychiatric:         Speech: Speech normal.       Neurologic Exam     Mental Status   Oriented to person, place, and time.   Registration of memory: Good recent and remote memory.   Attention: normal. Concentration: normal.   Speech: speech is normal   Level of consciousness: alert  Knowledge: consistent with education.     Cranial Nerves     CN II   Visual fields full to confrontation.   Visual acuity: normal    CN III, IV, VI   Pupils are equal, round, and reactive to light.  Extraocular motions are normal.     CN V   Facial sensation intact.   Right " corneal reflex: normal  Left corneal reflex: normal    CN VII   Facial expression full, symmetric.   Right facial weakness: none  Left facial weakness: none    CN VIII   Hearing: intact    CN IX, X   Palate: symmetric    CN XI   Right sternocleidomastoid strength: normal  Left sternocleidomastoid strength: normal    CN XII   Tongue: not atrophic  Tongue deviation: none    Motor Exam   Muscle bulk: normal  Right arm tone: normal  Left arm tone: normal  Right leg tone: normal  Left leg tone: normal    Strength   Strength 5/5 except as noted.   Left deltoid: 4/5    Sensory Exam   Light touch normal.     Gait, Coordination, and Reflexes     Gait  Gait: normal    Coordination   Finger to nose coordination: normal  Heel to shin coordination: normal    Reflexes   Right brachioradialis: 2+  Left brachioradialis: 2+  Right biceps: 2+  Left biceps: 2+  Right triceps: 2+  Left triceps: 2+  Right patellar: 2+  Left patellar: 2+  Right achilles: 2+  Left achilles: 2+  Right : 2+  Left : 2+          Assessment & Plan   Independent Review of Radiographic Studies:      I personally reviewed the images from the following studies.    Her cervical MRI shows a fused segment at C6-C7 and a left C4-C5 disc herniation and a smaller right C5-C6 disc herniation and loss of the cervical lordosis.  Agree with the report.        Medical Decision Making:      I described and recommended an anterior cervical discectomy and fusion with a cage and plate at C4-C5. The goal of surgery is relief of radiating arm pain and improvement of numbness, tingling, and weakness. This will help reduce but may not eliminate midline neck pain. The risks include, but are not limited to, infection, hemorrhage requiring transfusion or reoperation, CSF leak requiring reoperation, incomplete relief of symptoms, difficulty swallowing, hoarseness of voice, hardware problems requiring revision surgery, potential need for additional surgery in the future, stroke,  paralysis, coma, and death. The patient agrees to proceed.  I have given her some pain medications to tide her over.    Diagnoses and all orders for this visit:    1. Cervical disc disorder at C4-C5 level with radiculopathy (Primary)  -     HYDROcodone-acetaminophen (NORCO) 5-325 MG per tablet; Take 1 tablet by mouth Every 6 (Six) Hours As Needed for Severe Pain .  Dispense: 40 tablet; Refill: 0  -     Case Request; Standing  -     COVID PRE-OP / PRE-PROCEDURE SCREENING ORDER (NO ISOLATION) - Swab, Nasopharynx; Future  -     Type & Screen; Future  -     Case Request    2. History of spinal fusion  -     HYDROcodone-acetaminophen (NORCO) 5-325 MG per tablet; Take 1 tablet by mouth Every 6 (Six) Hours As Needed for Severe Pain .  Dispense: 40 tablet; Refill: 0  -     Case Request; Standing  -     COVID PRE-OP / PRE-PROCEDURE SCREENING ORDER (NO ISOLATION) - Swab, Nasopharynx; Future  -     Type & Screen; Future  -     Case Request    3. Left arm weakness  -     HYDROcodone-acetaminophen (NORCO) 5-325 MG per tablet; Take 1 tablet by mouth Every 6 (Six) Hours As Needed for Severe Pain .  Dispense: 40 tablet; Refill: 0  -     Case Request; Standing  -     COVID PRE-OP / PRE-PROCEDURE SCREENING ORDER (NO ISOLATION) - Swab, Nasopharynx; Future  -     Type & Screen; Future  -     Case Request    Other orders  -     Obtain informed consent  -     Provide NPO Instructions to Patient; Future  -     Chlorhexidine Skin Prep; Future      Return for 2 weeks after surgery with an APC.

## 2022-07-18 ENCOUNTER — TRANSCRIBE ORDERS (OUTPATIENT)
Dept: ADMINISTRATIVE | Facility: HOSPITAL | Age: 42
End: 2022-07-18

## 2022-07-18 ENCOUNTER — TELEPHONE (OUTPATIENT)
Dept: NEUROSURGERY | Facility: CLINIC | Age: 42
End: 2022-07-18

## 2022-07-18 DIAGNOSIS — Z01.818 OTHER SPECIFIED PRE-OPERATIVE EXAMINATION: Primary | ICD-10-CM

## 2022-07-18 NOTE — TELEPHONE ENCOUNTER
I S/W PT AND GAVE HER PAT DATE/TIME OF 7/19/22 9:30AM AT [facility] AND SURGERY DATE/TIME OF 7/21/22 ARRIVAL TIME OF 11AM AND SURGERY WITH DR WEAVER AT 1:30PM. PT ADVISED TO STOP BLOOD THINNERS/NSAIDS/ASPIRINS IF ON ANY. PT VOICED UNDERSTANDING OF ALL INSTRUCTIONS.

## 2022-07-19 ENCOUNTER — APPOINTMENT (OUTPATIENT)
Dept: LAB | Facility: HOSPITAL | Age: 42
End: 2022-07-19

## 2022-07-19 ENCOUNTER — PRE-ADMISSION TESTING (OUTPATIENT)
Dept: PREADMISSION TESTING | Facility: HOSPITAL | Age: 42
End: 2022-07-19

## 2022-07-19 VITALS
OXYGEN SATURATION: 98 % | HEIGHT: 67 IN | HEART RATE: 74 BPM | BODY MASS INDEX: 21.19 KG/M2 | TEMPERATURE: 97.5 F | SYSTOLIC BLOOD PRESSURE: 109 MMHG | RESPIRATION RATE: 20 BRPM | WEIGHT: 135 LBS | DIASTOLIC BLOOD PRESSURE: 78 MMHG

## 2022-07-19 DIAGNOSIS — M50.121 CERVICAL DISC DISORDER AT C4-C5 LEVEL WITH RADICULOPATHY: ICD-10-CM

## 2022-07-19 DIAGNOSIS — Z98.1 HISTORY OF SPINAL FUSION: ICD-10-CM

## 2022-07-19 DIAGNOSIS — R29.898 LEFT ARM WEAKNESS: ICD-10-CM

## 2022-07-19 LAB
ABO GROUP BLD: NORMAL
ANION GAP SERPL CALCULATED.3IONS-SCNC: 7.9 MMOL/L (ref 5–15)
BLD GP AB SCN SERPL QL: NEGATIVE
BUN SERPL-MCNC: 11 MG/DL (ref 6–20)
BUN/CREAT SERPL: 12.6 (ref 7–25)
CALCIUM SPEC-SCNC: 9.4 MG/DL (ref 8.6–10.5)
CHLORIDE SERPL-SCNC: 99 MMOL/L (ref 98–107)
CO2 SERPL-SCNC: 28.1 MMOL/L (ref 22–29)
CREAT SERPL-MCNC: 0.87 MG/DL (ref 0.57–1)
DEPRECATED RDW RBC AUTO: 41.5 FL (ref 37–54)
EGFRCR SERPLBLD CKD-EPI 2021: 86 ML/MIN/1.73
ERYTHROCYTE [DISTWIDTH] IN BLOOD BY AUTOMATED COUNT: 12.6 % (ref 12.3–15.4)
GLUCOSE SERPL-MCNC: 93 MG/DL (ref 65–99)
HCT VFR BLD AUTO: 42 % (ref 34–46.6)
HGB BLD-MCNC: 14.3 G/DL (ref 12–15.9)
MCH RBC QN AUTO: 30.8 PG (ref 26.6–33)
MCHC RBC AUTO-ENTMCNC: 34 G/DL (ref 31.5–35.7)
MCV RBC AUTO: 90.3 FL (ref 79–97)
PLATELET # BLD AUTO: 253 10*3/MM3 (ref 140–450)
PMV BLD AUTO: 10.5 FL (ref 6–12)
POTASSIUM SERPL-SCNC: 5.7 MMOL/L (ref 3.5–5.2)
QT INTERVAL: 383 MS
RBC # BLD AUTO: 4.65 10*6/MM3 (ref 3.77–5.28)
RH BLD: POSITIVE
SARS-COV-2 ORF1AB RESP QL NAA+PROBE: NOT DETECTED
SODIUM SERPL-SCNC: 135 MMOL/L (ref 136–145)
T&S EXPIRATION DATE: NORMAL
WBC NRBC COR # BLD: 6.64 10*3/MM3 (ref 3.4–10.8)

## 2022-07-19 PROCEDURE — 93010 ELECTROCARDIOGRAM REPORT: CPT | Performed by: INTERNAL MEDICINE

## 2022-07-19 PROCEDURE — C9803 HOPD COVID-19 SPEC COLLECT: HCPCS

## 2022-07-19 PROCEDURE — 86901 BLOOD TYPING SEROLOGIC RH(D): CPT

## 2022-07-19 PROCEDURE — 86850 RBC ANTIBODY SCREEN: CPT

## 2022-07-19 PROCEDURE — 86900 BLOOD TYPING SEROLOGIC ABO: CPT

## 2022-07-19 PROCEDURE — 85027 COMPLETE CBC AUTOMATED: CPT

## 2022-07-19 PROCEDURE — 93005 ELECTROCARDIOGRAM TRACING: CPT

## 2022-07-19 PROCEDURE — 36415 COLL VENOUS BLD VENIPUNCTURE: CPT

## 2022-07-19 PROCEDURE — 80048 BASIC METABOLIC PNL TOTAL CA: CPT

## 2022-07-19 PROCEDURE — U0004 COV-19 TEST NON-CDC HGH THRU: HCPCS

## 2022-07-19 RX ORDER — CHLORHEXIDINE GLUCONATE 500 MG/1
1 CLOTH TOPICAL
COMMUNITY
End: 2022-07-22 | Stop reason: HOSPADM

## 2022-07-19 RX ORDER — METOPROLOL SUCCINATE 50 MG/1
50 TABLET, EXTENDED RELEASE ORAL NIGHTLY
COMMUNITY
End: 2022-09-19

## 2022-07-19 NOTE — DISCHARGE INSTRUCTIONS
Take the following medications the morning of surgery:    prozac    If you are on prescription narcotic pain medication to control your pain you may also take that medication the morning of surgery.    General Instructions:  Do not eat solid food after midnight the night before surgery.  You may drink clear liquids day of surgery but must stop at least one hour before your hospital arrival time.  It is beneficial for you to have a clear drink that contains carbohydrates the day of surgery.  We suggest a 12 to 20 ounce bottle of Gatorade or Powerade for non-diabetic patients or a 12 to 20 ounce bottle of G2 or Powerade Zero for diabetic patients. (Pediatric patients, are not advised to drink a 12 to 20 ounce carbohydrate drink)    Clear liquids are liquids you can see through.  Nothing red in color.     Plain water                               Sports drinks  Sodas                                   Gelatin (Jell-O)  Fruit juices without pulp such as white grape juice and apple juice  Popsicles that contain no fruit or yogurt  Tea or coffee (no cream or milk added)  Gatorade / Powerade  G2 / Powerade Zero    Infants may have breast milk up to four hours before surgery.  Infants drinking formula may drink formula up to six hours before surgery.   Patients who avoid smoking, chewing tobacco and alcohol for 4 weeks prior to surgery have a reduced risk of post-operative complications.  Quit smoking as many days before surgery as you can.  Do not smoke, use chewing tobacco or drink alcohol the day of surgery.   If applicable bring your C-PAP/ BI-PAP machine.  Bring any papers given to you in the doctor’s office.  Wear clean comfortable clothes.  Do not wear contact lenses, false eyelashes or make-up.  Bring a case for your glasses.   Bring crutches or walker if applicable.  Remove all piercings.  Leave jewelry and any other valuables at home.  Hair extensions with metal clips must be removed prior to surgery.  The  Pre-Admission Testing nurse will instruct you to bring medications if unable to obtain an accurate list in Pre-Admission Testing.        If you were given a blood bank ID arm band remember to bring it with you the day of surgery.    Preventing a Surgical Site Infection:  For 2 to 3 days before surgery, avoid shaving with a razor because the razor can irritate skin and make it easier to develop an infection.    Any areas of open skin can increase the risk of a post-operative wound infection by allowing bacteria to enter and travel throughout the body.  Notify your surgeon if you have any skin wounds / rashes even if it is not near the expected surgical site.  The area will need assessed to determine if surgery should be delayed until it is healed.  The night prior to surgery shower using a fresh bar of anti-bacterial soap (such as Dial) and clean washcloth.  Sleep in a clean bed with clean clothing.  Do not allow pets to sleep with you.  Shower on the morning of surgery using a fresh bar of anti-bacterial soap (such as Dial) and clean washcloth.  Dry with a clean towel and dress in clean clothing.  Ask your surgeon if you will be receiving antibiotics prior to surgery.  Make sure you, your family, and all healthcare providers clean their hands with soap and water or an alcohol based hand  before caring for you or your wound.    Day of surgery:7/21/2022  1100  Your arrival time is approximately two hours before your scheduled surgery time.  Upon arrival, a Pre-op nurse and Anesthesiologist will review your health history, obtain vital signs, and answer questions you may have.  The only belongings needed at this time will be a list of your home medications and if applicable your C-PAP/BI-PAP machine.  A Pre-op nurse will start an IV and you may receive medication in preparation for surgery, including something to help you relax.     Please be aware that surgery does come with discomfort.  We want to make every  effort to control your discomfort so please discuss any uncontrolled symptoms with your nurse.   Your doctor will most likely have prescribed pain medications.      If you are going home after surgery you will receive individualized written care instructions before being discharged.  A responsible adult must drive you to and from the hospital on the day of your surgery and stay with you for 24 hours.  Discharge prescriptions can be filled by the hospital pharmacy during regular pharmacy hours.  If you are having surgery late in the day/evening your prescription may be e-prescribed to your pharmacy.  Please verify your pharmacy hours or chose a 24 hour pharmacy to avoid not having access to your prescription because your pharmacy has closed for the day.    If you are staying overnight following surgery, you will be transported to your hospital room following the recovery period.  [facility] has all private rooms.    If you have any questions please call Pre-Admission Testing at [phone].  Deductibles and co-payments are collected on the day of service. Please be prepared to pay the required co-pay, deductible or deposit on the day of service as defined by your plan.    Patient Education for Self-Quarantine Process    Following your COVID testing, we strongly recommend that you wear a mask when you are with other people and practice social distancing.   Limit your activities to only required outings.  Wash your hands with soap and water frequently for at least 20 seconds.   Avoid touching your eyes, nose and mouth with unwashed hands.  Do not share anything - utensils, drinking glasses, food from the same bowl.   Sanitize household surfaces daily. Include all high touch areas (door handles, light switches, phones, countertops, etc.)    Call your surgeon immediately if you experience any of the following symptoms:  Sore Throat  Shortness of Breath or difficulty breathing  Cough  Chills  Body  soreness or muscle pain  Headache  Fever  New loss of taste or smell  Do not arrive for your surgery ill.  Your procedure will need to be rescheduled to another time.  You will need to call your physician before the day of surgery to avoid any unnecessary exposure to hospital staff as well as other patients.   CHLORHEXIDINE CLOTH INSTRUCTIONS  The morning of surgery follow these instructions using the Chlorhexidine cloths you've been given.  These steps reduce bacteria on the body.  Do not use the cloths near your eyes, ears mouth, genitalia or on open wounds.  Throw the cloths away after use but do not try to flush them down a toilet.      Open and remove one cloth at a time from the package.    Leave the cloth unfolded and begin the bathing.  Massage the skin with the cloths using gentle pressure to remove bacteria.  Do not scrub harshly.   Follow the steps below with one 2% CHG cloth per area (6 total cloths).  One cloth for neck, shoulders and chest.  One cloth for both arms, hands, fingers and underarms (do underarms last).  One cloth for the abdomen followed by groin.  One cloth for right leg and foot including between the toes.  One cloth for left leg and foot including between the toes.  The last cloth is to be used for the back of the neck, back and buttocks.    Allow the CHG to air dry 3 minutes on the skin which will give it time to work and decrease the chance of irritation.  The skin may feel sticky until it is dry.  Do not rinse with water or any other liquid or you will lose the beneficial effects of the CHG.  If mild skin irritation occurs, do rinse the skin to remove the CHG.  Report this to the nurse at time of admission.  Do not apply lotions, creams, ointments, deodorants or perfumes after using the clothes. Dress in clean clothes before coming to the hospital.

## 2022-07-20 NOTE — TELEPHONE ENCOUNTER
I LEFT PT MS TO INFORM HER THAT HER SURGERY CASE HAS BEEN MOVED UP EARLIER IN THE DAY AND THAT PT NEEDS TO NOW ARRIVE AT [facility] AT 9AM. I ALSO ADVISED PT PER DR WEAVER HE USUALLY PRESCRIBES ORAL STEROIDS WITH THIS SURGERY. I ALSO ASKED THAT PT CALL ME BACK TO CONFIRM THAT SHE DID GET THIS MESSAGE.

## 2022-07-21 ENCOUNTER — APPOINTMENT (OUTPATIENT)
Dept: GENERAL RADIOLOGY | Facility: HOSPITAL | Age: 42
End: 2022-07-21

## 2022-07-21 ENCOUNTER — ANESTHESIA (OUTPATIENT)
Dept: PERIOP | Facility: HOSPITAL | Age: 42
End: 2022-07-21

## 2022-07-21 ENCOUNTER — HOSPITAL ENCOUNTER (OUTPATIENT)
Facility: HOSPITAL | Age: 42
Discharge: HOME | End: 2022-07-22
Attending: NEUROLOGICAL SURGERY | Admitting: NEUROLOGICAL SURGERY

## 2022-07-21 ENCOUNTER — ANESTHESIA EVENT (OUTPATIENT)
Dept: PERIOP | Facility: HOSPITAL | Age: 42
End: 2022-07-21

## 2022-07-21 DIAGNOSIS — R29.898 LEFT ARM WEAKNESS: ICD-10-CM

## 2022-07-21 DIAGNOSIS — M50.20 CERVICAL DISC HERNIATION: ICD-10-CM

## 2022-07-21 DIAGNOSIS — Z98.1 HISTORY OF SPINAL FUSION: ICD-10-CM

## 2022-07-21 DIAGNOSIS — M50.121 CERVICAL DISC DISORDER AT C4-C5 LEVEL WITH RADICULOPATHY: Primary | ICD-10-CM

## 2022-07-21 LAB — POTASSIUM SERPL-SCNC: 3.8 MMOL/L (ref 3.5–5.2)

## 2022-07-21 PROCEDURE — 22551 ARTHRD ANT NTRBDY CERVICAL: CPT | Performed by: NEUROLOGICAL SURGERY

## 2022-07-21 PROCEDURE — C1713 ANCHOR/SCREW BN/BN,TIS/BN: HCPCS | Performed by: NEUROLOGICAL SURGERY

## 2022-07-21 PROCEDURE — 25010000002 MIDAZOLAM PER 1 MG: Performed by: ANESTHESIOLOGY

## 2022-07-21 PROCEDURE — 25010000002 PROPOFOL 10 MG/ML EMULSION: Performed by: NURSE ANESTHETIST, CERTIFIED REGISTERED

## 2022-07-21 PROCEDURE — 25010000002 CEFAZOLIN PER 500 MG: Performed by: NEUROLOGICAL SURGERY

## 2022-07-21 PROCEDURE — 22845 INSERT SPINE FIXATION DEVICE: CPT | Performed by: SPECIALIST/TECHNOLOGIST, OTHER

## 2022-07-21 PROCEDURE — 25010000002 FENTANYL CITRATE (PF) 50 MCG/ML SOLUTION: Performed by: NURSE ANESTHETIST, CERTIFIED REGISTERED

## 2022-07-21 PROCEDURE — 25010000002 METHOCARBAMOL 1000 MG/10ML SOLUTION: Performed by: NEUROLOGICAL SURGERY

## 2022-07-21 PROCEDURE — 25010000002 DEXAMETHASONE PER 1 MG: Performed by: NEUROLOGICAL SURGERY

## 2022-07-21 PROCEDURE — 22845 INSERT SPINE FIXATION DEVICE: CPT | Performed by: NEUROLOGICAL SURGERY

## 2022-07-21 PROCEDURE — G0378 HOSPITAL OBSERVATION PER HR: HCPCS

## 2022-07-21 PROCEDURE — 25010000002 FENTANYL CITRATE (PF) 50 MCG/ML SOLUTION: Performed by: ANESTHESIOLOGY

## 2022-07-21 PROCEDURE — 25010000002 MIDAZOLAM PER 1 MG: Performed by: NURSE ANESTHETIST, CERTIFIED REGISTERED

## 2022-07-21 PROCEDURE — 84132 ASSAY OF SERUM POTASSIUM: CPT | Performed by: NEUROLOGICAL SURGERY

## 2022-07-21 PROCEDURE — 22853 INSJ BIOMECHANICAL DEVICE: CPT | Performed by: SPECIALIST/TECHNOLOGIST, OTHER

## 2022-07-21 PROCEDURE — 22853 INSJ BIOMECHANICAL DEVICE: CPT | Performed by: NEUROLOGICAL SURGERY

## 2022-07-21 PROCEDURE — 72040 X-RAY EXAM NECK SPINE 2-3 VW: CPT | Performed by: NEUROLOGICAL SURGERY

## 2022-07-21 PROCEDURE — 25010000002 DEXAMETHASONE PER 1 MG: Performed by: NURSE ANESTHETIST, CERTIFIED REGISTERED

## 2022-07-21 PROCEDURE — 22551 ARTHRD ANT NTRBDY CERVICAL: CPT | Performed by: SPECIALIST/TECHNOLOGIST, OTHER

## 2022-07-21 PROCEDURE — 25010000002 HYDROMORPHONE PER 4 MG: Performed by: NURSE ANESTHETIST, CERTIFIED REGISTERED

## 2022-07-21 PROCEDURE — 25010000002 ONDANSETRON PER 1 MG: Performed by: NURSE ANESTHETIST, CERTIFIED REGISTERED

## 2022-07-21 PROCEDURE — 76000 FLUOROSCOPY <1 HR PHYS/QHP: CPT | Performed by: NEUROLOGICAL SURGERY

## 2022-07-21 PROCEDURE — 25010000002 NEOSTIGMINE 5 MG/10ML SOLUTION: Performed by: NURSE ANESTHETIST, CERTIFIED REGISTERED

## 2022-07-21 DEVICE — PUTTY DBF GRAFTON 3CC: Type: IMPLANTABLE DEVICE | Site: SPINE CERVICAL | Status: FUNCTIONAL

## 2022-07-21 DEVICE — INTERBODY FUSION DEVICE NANOLOCK SURFACE TECHNOLOGY 6 DEGREE SMALL 6MM
Type: IMPLANTABLE DEVICE | Site: SPINE CERVICAL | Status: FUNCTIONAL
Brand: ENDOSKELETON TC

## 2022-07-21 DEVICE — SSC BONE WAX
Type: IMPLANTABLE DEVICE | Site: SPINE CERVICAL | Status: FUNCTIONAL
Brand: SSC BONE WAX

## 2022-07-21 RX ORDER — FLUMAZENIL 0.1 MG/ML
0.2 INJECTION INTRAVENOUS AS NEEDED
Status: DISCONTINUED | OUTPATIENT
Start: 2022-07-21 | End: 2022-07-21 | Stop reason: HOSPADM

## 2022-07-21 RX ORDER — SODIUM CHLORIDE 0.9 % (FLUSH) 0.9 %
10 SYRINGE (ML) INJECTION AS NEEDED
Status: DISCONTINUED | OUTPATIENT
Start: 2022-07-21 | End: 2022-07-22 | Stop reason: HOSPADM

## 2022-07-21 RX ORDER — HYDROMORPHONE HYDROCHLORIDE 1 MG/ML
0.5 INJECTION, SOLUTION INTRAMUSCULAR; INTRAVENOUS; SUBCUTANEOUS
Status: DISCONTINUED | OUTPATIENT
Start: 2022-07-21 | End: 2022-07-22 | Stop reason: HOSPADM

## 2022-07-21 RX ORDER — MIDAZOLAM HYDROCHLORIDE 1 MG/ML
INJECTION INTRAMUSCULAR; INTRAVENOUS AS NEEDED
Status: DISCONTINUED | OUTPATIENT
Start: 2022-07-21 | End: 2022-07-21 | Stop reason: SURG

## 2022-07-21 RX ORDER — FENTANYL CITRATE 50 UG/ML
INJECTION, SOLUTION INTRAMUSCULAR; INTRAVENOUS AS NEEDED
Status: DISCONTINUED | OUTPATIENT
Start: 2022-07-21 | End: 2022-07-21 | Stop reason: SURG

## 2022-07-21 RX ORDER — DIPHENHYDRAMINE HCL 25 MG
25 CAPSULE ORAL
Status: DISCONTINUED | OUTPATIENT
Start: 2022-07-21 | End: 2022-07-21 | Stop reason: HOSPADM

## 2022-07-21 RX ORDER — BUTALBITAL, ACETAMINOPHEN AND CAFFEINE 300; 40; 50 MG/1; MG/1; MG/1
1 CAPSULE ORAL EVERY 4 HOURS PRN
Status: DISCONTINUED | OUTPATIENT
Start: 2022-07-21 | End: 2022-07-21

## 2022-07-21 RX ORDER — SCOLOPAMINE TRANSDERMAL SYSTEM 1 MG/1
1 PATCH, EXTENDED RELEASE TRANSDERMAL ONCE
Status: DISCONTINUED | OUTPATIENT
Start: 2022-07-21 | End: 2022-07-21

## 2022-07-21 RX ORDER — EPHEDRINE SULFATE 50 MG/ML
5 INJECTION, SOLUTION INTRAVENOUS ONCE AS NEEDED
Status: DISCONTINUED | OUTPATIENT
Start: 2022-07-21 | End: 2022-07-21 | Stop reason: HOSPADM

## 2022-07-21 RX ORDER — FENTANYL CITRATE 50 UG/ML
50 INJECTION, SOLUTION INTRAMUSCULAR; INTRAVENOUS
Status: DISCONTINUED | OUTPATIENT
Start: 2022-07-21 | End: 2022-07-21 | Stop reason: HOSPADM

## 2022-07-21 RX ORDER — ACETAMINOPHEN 325 MG/1
650 TABLET ORAL EVERY 4 HOURS PRN
Status: DISCONTINUED | OUTPATIENT
Start: 2022-07-21 | End: 2022-07-22 | Stop reason: HOSPADM

## 2022-07-21 RX ORDER — NEOSTIGMINE METHYLSULFATE 0.5 MG/ML
INJECTION, SOLUTION INTRAVENOUS AS NEEDED
Status: DISCONTINUED | OUTPATIENT
Start: 2022-07-21 | End: 2022-07-21 | Stop reason: SURG

## 2022-07-21 RX ORDER — ONDANSETRON 2 MG/ML
4 INJECTION INTRAMUSCULAR; INTRAVENOUS EVERY 6 HOURS PRN
Status: DISCONTINUED | OUTPATIENT
Start: 2022-07-21 | End: 2022-07-22 | Stop reason: HOSPADM

## 2022-07-21 RX ORDER — HYDRALAZINE HYDROCHLORIDE 20 MG/ML
5 INJECTION INTRAMUSCULAR; INTRAVENOUS
Status: DISCONTINUED | OUTPATIENT
Start: 2022-07-21 | End: 2022-07-21 | Stop reason: HOSPADM

## 2022-07-21 RX ORDER — SODIUM CHLORIDE, SODIUM LACTATE, POTASSIUM CHLORIDE, CALCIUM CHLORIDE 600; 310; 30; 20 MG/100ML; MG/100ML; MG/100ML; MG/100ML
75 INJECTION, SOLUTION INTRAVENOUS CONTINUOUS
Status: DISCONTINUED | OUTPATIENT
Start: 2022-07-21 | End: 2022-07-22 | Stop reason: HOSPADM

## 2022-07-21 RX ORDER — NALOXONE HCL 0.4 MG/ML
0.4 VIAL (ML) INJECTION
Status: DISCONTINUED | OUTPATIENT
Start: 2022-07-21 | End: 2022-07-22 | Stop reason: HOSPADM

## 2022-07-21 RX ORDER — DEXAMETHASONE SODIUM PHOSPHATE 10 MG/ML
INJECTION INTRAMUSCULAR; INTRAVENOUS AS NEEDED
Status: DISCONTINUED | OUTPATIENT
Start: 2022-07-21 | End: 2022-07-21 | Stop reason: SURG

## 2022-07-21 RX ORDER — SODIUM CHLORIDE, SODIUM LACTATE, POTASSIUM CHLORIDE, CALCIUM CHLORIDE 600; 310; 30; 20 MG/100ML; MG/100ML; MG/100ML; MG/100ML
9 INJECTION, SOLUTION INTRAVENOUS CONTINUOUS
Status: DISCONTINUED | OUTPATIENT
Start: 2022-07-21 | End: 2022-07-21

## 2022-07-21 RX ORDER — GABAPENTIN 100 MG/1
200 CAPSULE ORAL NIGHTLY
Status: DISCONTINUED | OUTPATIENT
Start: 2022-07-21 | End: 2022-07-22 | Stop reason: HOSPADM

## 2022-07-21 RX ORDER — DOCUSATE SODIUM 100 MG/1
100 CAPSULE, LIQUID FILLED ORAL 2 TIMES DAILY PRN
Status: DISCONTINUED | OUTPATIENT
Start: 2022-07-21 | End: 2022-07-22 | Stop reason: HOSPADM

## 2022-07-21 RX ORDER — GLYCOPYRROLATE 0.2 MG/ML
INJECTION INTRAMUSCULAR; INTRAVENOUS AS NEEDED
Status: DISCONTINUED | OUTPATIENT
Start: 2022-07-21 | End: 2022-07-21 | Stop reason: SURG

## 2022-07-21 RX ORDER — ONDANSETRON 2 MG/ML
4 INJECTION INTRAMUSCULAR; INTRAVENOUS ONCE AS NEEDED
Status: COMPLETED | OUTPATIENT
Start: 2022-07-21 | End: 2022-07-21

## 2022-07-21 RX ORDER — AMOXICILLIN 250 MG
1 CAPSULE ORAL NIGHTLY PRN
Status: DISCONTINUED | OUTPATIENT
Start: 2022-07-21 | End: 2022-07-22 | Stop reason: HOSPADM

## 2022-07-21 RX ORDER — SODIUM CHLORIDE 0.9 % (FLUSH) 0.9 %
10 SYRINGE (ML) INJECTION EVERY 12 HOURS SCHEDULED
Status: DISCONTINUED | OUTPATIENT
Start: 2022-07-21 | End: 2022-07-22 | Stop reason: HOSPADM

## 2022-07-21 RX ORDER — BACLOFEN 10 MG/1
10 TABLET ORAL 3 TIMES DAILY PRN
Status: DISCONTINUED | OUTPATIENT
Start: 2022-07-21 | End: 2022-07-22 | Stop reason: HOSPADM

## 2022-07-21 RX ORDER — MIDAZOLAM HYDROCHLORIDE 1 MG/ML
1 INJECTION INTRAMUSCULAR; INTRAVENOUS
Status: DISCONTINUED | OUTPATIENT
Start: 2022-07-21 | End: 2022-07-21 | Stop reason: HOSPADM

## 2022-07-21 RX ORDER — ONDANSETRON 4 MG/1
4 TABLET, FILM COATED ORAL EVERY 6 HOURS PRN
Status: DISCONTINUED | OUTPATIENT
Start: 2022-07-21 | End: 2022-07-22 | Stop reason: HOSPADM

## 2022-07-21 RX ORDER — SODIUM CHLORIDE 0.9 % (FLUSH) 0.9 %
3 SYRINGE (ML) INJECTION EVERY 12 HOURS SCHEDULED
Status: DISCONTINUED | OUTPATIENT
Start: 2022-07-21 | End: 2022-07-21 | Stop reason: HOSPADM

## 2022-07-21 RX ORDER — FLUTICASONE PROPIONATE 50 MCG
1 SPRAY, SUSPENSION (ML) NASAL AS NEEDED
Status: DISCONTINUED | OUTPATIENT
Start: 2022-07-21 | End: 2022-07-22 | Stop reason: HOSPADM

## 2022-07-21 RX ORDER — SODIUM CHLORIDE 0.9 % (FLUSH) 0.9 %
3-10 SYRINGE (ML) INJECTION AS NEEDED
Status: DISCONTINUED | OUTPATIENT
Start: 2022-07-21 | End: 2022-07-21 | Stop reason: HOSPADM

## 2022-07-21 RX ORDER — ONDANSETRON 4 MG/1
4 TABLET, FILM COATED ORAL EVERY 8 HOURS PRN
COMMUNITY
End: 2022-09-19

## 2022-07-21 RX ORDER — NALOXONE HCL 0.4 MG/ML
0.2 VIAL (ML) INJECTION AS NEEDED
Status: DISCONTINUED | OUTPATIENT
Start: 2022-07-21 | End: 2022-07-21 | Stop reason: HOSPADM

## 2022-07-21 RX ORDER — METOPROLOL SUCCINATE 50 MG/1
50 TABLET, EXTENDED RELEASE ORAL NIGHTLY
Status: DISCONTINUED | OUTPATIENT
Start: 2022-07-21 | End: 2022-07-22 | Stop reason: HOSPADM

## 2022-07-21 RX ORDER — HYDROMORPHONE HYDROCHLORIDE 1 MG/ML
0.5 INJECTION, SOLUTION INTRAMUSCULAR; INTRAVENOUS; SUBCUTANEOUS
Status: DISCONTINUED | OUTPATIENT
Start: 2022-07-21 | End: 2022-07-21 | Stop reason: HOSPADM

## 2022-07-21 RX ORDER — LABETALOL HYDROCHLORIDE 5 MG/ML
5 INJECTION, SOLUTION INTRAVENOUS
Status: DISCONTINUED | OUTPATIENT
Start: 2022-07-21 | End: 2022-07-21 | Stop reason: HOSPADM

## 2022-07-21 RX ORDER — METHOCARBAMOL 100 MG/ML
500 INJECTION, SOLUTION INTRAMUSCULAR; INTRAVENOUS ONCE
Status: COMPLETED | OUTPATIENT
Start: 2022-07-21 | End: 2022-07-21

## 2022-07-21 RX ORDER — PROMETHAZINE HYDROCHLORIDE 25 MG/1
25 SUPPOSITORY RECTAL ONCE AS NEEDED
Status: DISCONTINUED | OUTPATIENT
Start: 2022-07-21 | End: 2022-07-21 | Stop reason: HOSPADM

## 2022-07-21 RX ORDER — PROMETHAZINE HYDROCHLORIDE 25 MG/1
25 TABLET ORAL ONCE AS NEEDED
Status: DISCONTINUED | OUTPATIENT
Start: 2022-07-21 | End: 2022-07-21 | Stop reason: HOSPADM

## 2022-07-21 RX ORDER — DEXAMETHASONE SODIUM PHOSPHATE 4 MG/ML
4 INJECTION, SOLUTION INTRA-ARTICULAR; INTRALESIONAL; INTRAMUSCULAR; INTRAVENOUS; SOFT TISSUE EVERY 6 HOURS
Status: DISCONTINUED | OUTPATIENT
Start: 2022-07-21 | End: 2022-07-21

## 2022-07-21 RX ORDER — DEXAMETHASONE SODIUM PHOSPHATE 4 MG/ML
4 INJECTION, SOLUTION INTRA-ARTICULAR; INTRALESIONAL; INTRAMUSCULAR; INTRAVENOUS; SOFT TISSUE EVERY 6 HOURS
Status: DISCONTINUED | OUTPATIENT
Start: 2022-07-21 | End: 2022-07-22 | Stop reason: HOSPADM

## 2022-07-21 RX ORDER — PROPOFOL 10 MG/ML
VIAL (ML) INTRAVENOUS AS NEEDED
Status: DISCONTINUED | OUTPATIENT
Start: 2022-07-21 | End: 2022-07-21 | Stop reason: SURG

## 2022-07-21 RX ORDER — OXYCODONE AND ACETAMINOPHEN 7.5; 325 MG/1; MG/1
1 TABLET ORAL EVERY 4 HOURS PRN
Status: DISCONTINUED | OUTPATIENT
Start: 2022-07-21 | End: 2022-07-21 | Stop reason: HOSPADM

## 2022-07-21 RX ORDER — CEFAZOLIN SODIUM 2 G/100ML
2 INJECTION, SOLUTION INTRAVENOUS EVERY 8 HOURS
Status: COMPLETED | OUTPATIENT
Start: 2022-07-21 | End: 2022-07-22

## 2022-07-21 RX ORDER — LIDOCAINE HYDROCHLORIDE 10 MG/ML
0.5 INJECTION, SOLUTION EPIDURAL; INFILTRATION; INTRACAUDAL; PERINEURAL ONCE AS NEEDED
Status: DISCONTINUED | OUTPATIENT
Start: 2022-07-21 | End: 2022-07-21 | Stop reason: HOSPADM

## 2022-07-21 RX ORDER — MAGNESIUM HYDROXIDE 1200 MG/15ML
LIQUID ORAL AS NEEDED
Status: DISCONTINUED | OUTPATIENT
Start: 2022-07-21 | End: 2022-07-21 | Stop reason: HOSPADM

## 2022-07-21 RX ORDER — CEFAZOLIN SODIUM 2 G/100ML
2 INJECTION, SOLUTION INTRAVENOUS ONCE
Status: COMPLETED | OUTPATIENT
Start: 2022-07-21 | End: 2022-07-21

## 2022-07-21 RX ORDER — DIPHENHYDRAMINE HYDROCHLORIDE 50 MG/ML
12.5 INJECTION INTRAMUSCULAR; INTRAVENOUS
Status: DISCONTINUED | OUTPATIENT
Start: 2022-07-21 | End: 2022-07-21 | Stop reason: HOSPADM

## 2022-07-21 RX ORDER — ONDANSETRON 2 MG/ML
INJECTION INTRAMUSCULAR; INTRAVENOUS AS NEEDED
Status: DISCONTINUED | OUTPATIENT
Start: 2022-07-21 | End: 2022-07-21 | Stop reason: SURG

## 2022-07-21 RX ORDER — FAMOTIDINE 10 MG/ML
20 INJECTION, SOLUTION INTRAVENOUS ONCE
Status: COMPLETED | OUTPATIENT
Start: 2022-07-21 | End: 2022-07-21

## 2022-07-21 RX ORDER — LIDOCAINE HYDROCHLORIDE 20 MG/ML
INJECTION, SOLUTION INFILTRATION; PERINEURAL AS NEEDED
Status: DISCONTINUED | OUTPATIENT
Start: 2022-07-21 | End: 2022-07-21 | Stop reason: SURG

## 2022-07-21 RX ORDER — ROCURONIUM BROMIDE 10 MG/ML
INJECTION, SOLUTION INTRAVENOUS AS NEEDED
Status: DISCONTINUED | OUTPATIENT
Start: 2022-07-21 | End: 2022-07-21 | Stop reason: SURG

## 2022-07-21 RX ORDER — FLUOXETINE HYDROCHLORIDE 20 MG/1
40 CAPSULE ORAL EVERY MORNING
Status: DISCONTINUED | OUTPATIENT
Start: 2022-07-22 | End: 2022-07-22 | Stop reason: HOSPADM

## 2022-07-21 RX ORDER — HYDROCODONE BITARTRATE AND ACETAMINOPHEN 5; 325 MG/1; MG/1
1 TABLET ORAL EVERY 4 HOURS PRN
Status: DISCONTINUED | OUTPATIENT
Start: 2022-07-21 | End: 2022-07-22 | Stop reason: HOSPADM

## 2022-07-21 RX ORDER — HYDROCODONE BITARTRATE AND ACETAMINOPHEN 7.5; 325 MG/1; MG/1
1 TABLET ORAL ONCE AS NEEDED
Status: COMPLETED | OUTPATIENT
Start: 2022-07-21 | End: 2022-07-21

## 2022-07-21 RX ORDER — HYDROMORPHONE HCL 110MG/55ML
PATIENT CONTROLLED ANALGESIA SYRINGE INTRAVENOUS AS NEEDED
Status: DISCONTINUED | OUTPATIENT
Start: 2022-07-21 | End: 2022-07-21 | Stop reason: SURG

## 2022-07-21 RX ADMIN — CEFAZOLIN 2 G: 10 INJECTION, POWDER, FOR SOLUTION INTRAVENOUS at 18:50

## 2022-07-21 RX ADMIN — HYDROMORPHONE HYDROCHLORIDE 0.5 MG: 1 INJECTION, SOLUTION INTRAMUSCULAR; INTRAVENOUS; SUBCUTANEOUS at 13:54

## 2022-07-21 RX ADMIN — ROCURONIUM BROMIDE 50 MG: 50 INJECTION INTRAVENOUS at 11:41

## 2022-07-21 RX ADMIN — METOPROLOL SUCCINATE 50 MG: 50 TABLET, EXTENDED RELEASE ORAL at 20:04

## 2022-07-21 RX ADMIN — FENTANYL CITRATE 50 MCG: 50 INJECTION INTRAMUSCULAR; INTRAVENOUS at 11:10

## 2022-07-21 RX ADMIN — FENTANYL CITRATE 100 MCG: 50 INJECTION INTRAMUSCULAR; INTRAVENOUS at 11:37

## 2022-07-21 RX ADMIN — GABAPENTIN 200 MG: 100 CAPSULE ORAL at 20:04

## 2022-07-21 RX ADMIN — PROPOFOL 150 MG: 10 INJECTION, EMULSION INTRAVENOUS at 11:41

## 2022-07-21 RX ADMIN — SODIUM CHLORIDE, POTASSIUM CHLORIDE, SODIUM LACTATE AND CALCIUM CHLORIDE 75 ML/HR: 600; 310; 30; 20 INJECTION, SOLUTION INTRAVENOUS at 18:50

## 2022-07-21 RX ADMIN — SCOPALAMINE 1 PATCH: 1 PATCH, EXTENDED RELEASE TRANSDERMAL at 11:10

## 2022-07-21 RX ADMIN — DEXAMETHASONE SODIUM PHOSPHATE 10 MG: 10 INJECTION INTRAMUSCULAR; INTRAVENOUS at 11:48

## 2022-07-21 RX ADMIN — DEXAMETHASONE SODIUM PHOSPHATE 4 MG: 4 INJECTION, SOLUTION INTRAMUSCULAR; INTRAVENOUS at 18:50

## 2022-07-21 RX ADMIN — CEFAZOLIN SODIUM 2 G: 10 INJECTION, POWDER, FOR SOLUTION INTRAVENOUS at 11:18

## 2022-07-21 RX ADMIN — HYDROCODONE BITARTRATE AND ACETAMINOPHEN 1 TABLET: 5; 325 TABLET ORAL at 19:19

## 2022-07-21 RX ADMIN — FAMOTIDINE 20 MG: 10 INJECTION INTRAVENOUS at 11:11

## 2022-07-21 RX ADMIN — METHOCARBAMOL 500 MG: 100 INJECTION INTRAMUSCULAR; INTRAVENOUS at 14:16

## 2022-07-21 RX ADMIN — HYDROCODONE BITARTRATE AND ACETAMINOPHEN 1 TABLET: 7.5; 325 TABLET ORAL at 15:25

## 2022-07-21 RX ADMIN — ONDANSETRON 4 MG: 2 INJECTION INTRAMUSCULAR; INTRAVENOUS at 13:58

## 2022-07-21 RX ADMIN — HYDROMORPHONE HYDROCHLORIDE 0.5 MG: 2 INJECTION, SOLUTION INTRAMUSCULAR; INTRAVENOUS; SUBCUTANEOUS at 13:04

## 2022-07-21 RX ADMIN — Medication 10 ML: at 18:50

## 2022-07-21 RX ADMIN — FENTANYL CITRATE 50 MCG: 50 INJECTION INTRAMUSCULAR; INTRAVENOUS at 13:46

## 2022-07-21 RX ADMIN — ONDANSETRON 4 MG: 2 INJECTION INTRAMUSCULAR; INTRAVENOUS at 13:25

## 2022-07-21 RX ADMIN — SODIUM CHLORIDE, POTASSIUM CHLORIDE, SODIUM LACTATE AND CALCIUM CHLORIDE 9 ML/HR: 600; 310; 30; 20 INJECTION, SOLUTION INTRAVENOUS at 11:10

## 2022-07-21 RX ADMIN — LIDOCAINE HYDROCHLORIDE 100 MG: 20 INJECTION, SOLUTION INFILTRATION; PERINEURAL at 11:41

## 2022-07-21 RX ADMIN — MIDAZOLAM 2 MG: 1 INJECTION INTRAMUSCULAR; INTRAVENOUS at 11:30

## 2022-07-21 RX ADMIN — MIDAZOLAM 1 MG: 1 INJECTION INTRAMUSCULAR; INTRAVENOUS at 11:11

## 2022-07-21 RX ADMIN — NEOSTIGMINE METHYLSULFATE 4 MG: 0.5 INJECTION INTRAVENOUS at 13:25

## 2022-07-21 RX ADMIN — HYDROMORPHONE HYDROCHLORIDE 0.5 MG: 1 INJECTION, SOLUTION INTRAMUSCULAR; INTRAVENOUS; SUBCUTANEOUS at 14:49

## 2022-07-21 RX ADMIN — GLYCOPYRROLATE 0.4 MG: 0.2 INJECTION INTRAMUSCULAR; INTRAVENOUS at 13:25

## 2022-07-21 RX ADMIN — BACLOFEN 10 MG: 10 TABLET ORAL at 15:28

## 2022-07-21 NOTE — ANESTHESIA POSTPROCEDURE EVALUATION
"Patient: Jacqueline Coronel    Procedure Summary     Date: 07/21/22 Room / Location: [facility] OR [address] [place] [address] MAIN OR    Anesthesia Start: 1128 Anesthesia Stop: 1339    Procedure: Anterior cervical discectomy and fusion with cage and plate, cervical four/five (Left Spine Cervical) Diagnosis:       Cervical disc disorder at C4-C5 level with radiculopathy      History of spinal fusion      Left arm weakness      (Cervical disc disorder at C4-C5 level with radiculopathy [M50.121])      (History of spinal fusion [Z98.1])      (Left arm weakness [R29.898])    Surgeons: Shar Muñoz MD Provider: Josue Farrell MD    Anesthesia Type: general ASA Status: 2          Anesthesia Type: general    Vitals  Vitals Value Taken Time   /83 07/21/22 1426   Temp     Pulse 71 07/21/22 1437   Resp     SpO2 95 % 07/21/22 1437   Vitals shown include unvalidated device data.        Post Anesthesia Care and Evaluation    Patient location during evaluation: bedside  Patient participation: complete - patient participated  Level of consciousness: sleepy but conscious  Pain score: 0  Pain management: adequate    Airway patency: patent  Anesthetic complications: No anesthetic complications    Cardiovascular status: acceptable  Respiratory status: acceptable  Hydration status: acceptable    Comments: /81 (BP Location: Right arm, Patient Position: Lying)   Pulse 80   Temp 36.9 °C (98.5 °F) (Oral)   Resp 18   Ht 170.2 cm (67\")   Wt 62.1 kg (136 lb 14.5 oz)   LMP 11/29/2019 (Approximate)   SpO2 100% Comment: post sedation  BMI 21.44 kg/m²         "

## 2022-07-21 NOTE — OP NOTE
Preoperative diagnosis: Herniated cervical disc at C4-C5 with left-sided radiculopathy and motor deficit    Postoperative diagnosis: Same as above    Procedures performed: ACDF at C4-C5 with cage and plate    Surgeon: Toni    First Assistant: Jazmine Villalobos  (She greatly assisted in the exposure, visualization of neural structures, control of bleeding, retraction, and closure of the incision. Her skilled assistance was necessary for the success of this case.)    Anesthesia: GET    EBL: 25 cc    Complications: none    Specimen sent: none    Drains: none    Findings: Large disc extrusion to the left    Postoperative condition: Good    Indications for the operation: The patient is a healthy 42-year-old female who about 2 years ago had a ACDF at C6-C7 for severe arm pain and weakness.  She did well but unfortunately her pain recurred and attributable to an adjacent level disc herniation at C4-C5 with a rather large extrusion.  She had some deltoid weakness and I felt that blocks and therapy would not be sufficient and so we brought her back to the operating room today for a separate discectomy and fusion at another level above.    Informed consent: She understood that the goal of surgery is relief of radiating arm pain and improvement of numbness, tingling, and weakness. This will help reduce but may not eliminate midline neck pain. The risks include, but are not limited to, infection, hemorrhage requiring transfusion or reoperation, CSF leak requiring reoperation, incomplete relief of symptoms, difficulty swallowing, hoarseness of voice, hardware problems requiring revision surgery, potential need for additional surgery in the future, stroke, paralysis, coma, and death. The patient agrees to proceed.     Details of the operation: The patient was taken to the operating room and placed placed on the operating table in the supine position.  After induction and endotracheal intubation, she got 2 g of Kefzol as per  the SCIP protocol and 10 mg of Decadron.  She was put in extension with a shoulder roll.  We brought C arm and and marked out the incision above the old incision and at this time at the C4-C5 level the anterior cervical region was then prepped and draped in usual sterile fashion.  We did a surgical timeout.  A transverse incision was made at the level of C4-C5 with a #10 skin knife.  Hemostasis was obtained with monopolar cautery.  Dissection was taken all the way down to the platysma which divided in a transverse fashion undermined with Metzenbaum scissors.  The omohyoid was then defined retracted medially.  The sternocleidomastoid was then applied retracted laterally.  The pretracheal and prevertebral fascia were opened up sharply.  I palpated the carotid and moved at laterally.  The anterior surface of the spine was palpated put a needle in what turned out to be the C4-C5 disc space by C arm.  The longus coli were mobilized.  Self-retaining retractors were placed medially laterally superiorly and inferiorly.  The microscope was draped and brought into the field.  Its use was essential for the performance of microneurosurgical technique.  Using a 3 mm matchstick and an intervertebral  I did a generous discectomy from uncovertebral joint to uncovertebral joint all the way down to the posterior longitudinal ligament.  It was opened up sharply with a sharp nerve hook and the remainder of the bony and ligamentous removal was done with 2 mm angled Cloward punches as well as 1 mm angled Cloward punches.  I started first on the asymptomatic side the right side of the removed disc and opened up the neural foramen.  I did so on the left which took a bit more work because there was some disc material in the extrusion attached to the vertebral body this had to be removed piecemeal with the punch and a curette.  I was confident that the left C5 root was completely decompressed as was the right.  We sized for a 6 x 14  x 12 mm Titan TC cage which was packed with demineralized bone matrix and impacted into place and then I 25 mm Atlantis Elite plate was installed at C4 using bilateral 13 mm screws with a diameter 4.0 and also at C5 with a pair of 4.0 x 13 mm screws with excellent purchase.  The central locking screw screw was turned.  Hemostasis was obtained with thrombin soaked Gelfoam and Floseal.  X-rays were taken which showed good position of the cage the plate and the screws at the C4-C5 level no drain was necessary.  The platysma was reapproximated 3-0 interrupted Vicryl suture.  Subcutaneous layer was closed with 3-0 interrupted Vicryl suture.  The skin was closed with a running 4-0 Vicryl subcuticular.  Steri-Strips and a clean dry dressing were placed.  Soft collar was placed.  She was extubated taken to recovery in satisfactory condition.  All

## 2022-07-21 NOTE — ANESTHESIA PREPROCEDURE EVALUATION
Anesthesia Evaluation     Patient summary reviewed and Nursing notes reviewed   history of anesthetic complications: PONV               Airway   Mallampati: II  TM distance: >3 FB  Neck ROM: limited  Dental      Pulmonary    (+) a smoker Former,   Cardiovascular     ECG reviewed  Patient on routine beta blocker and Beta blocker given within 24 hours of surgery  Rhythm: regular  Rate: normal    (+) hypertension,       Neuro/Psych  (+) headaches, psychiatric history Depression and Anxiety,    GI/Hepatic/Renal/Endo - negative ROS     Musculoskeletal     Abdominal    Substance History - negative use     OB/GYN negative ob/gyn ROS         Other   arthritis,                      Anesthesia Plan    ASA 2     general     (I have reviewed the patient's history with the patient and the chart, including all pertinent laboratory results and imaging. I have explained the risks of anesthesia including but not limited to dental damage, corneal abrasion, nerve injury, MI, stroke, and death. Questions asked and answered. Anesthetic plan discussed with patient and team as indicated. Patient expressed understanding of the above.  )  intravenous induction     Anesthetic plan, risks, benefits, and alternatives have been provided, discussed and informed consent has been obtained with: patient.        CODE STATUS:

## 2022-07-21 NOTE — PLAN OF CARE
Goal Outcome Evaluation:  Plan of Care Reviewed With: patient        Progress: improving  Outcome Evaluation: Patient admitted to unit in stable condition from PACU after undergoing anterior cervical discectomy and fusion. VSS and voiding function is intact. Pain is managed with po meds, ice and positioning. Ambulating independently and without issue. Patient educated on bp monitoring and med management. Plans for d/c home with family assist tomorrow if stable.

## 2022-07-21 NOTE — ANESTHESIA PROCEDURE NOTES
Airway  Urgency: elective    Date/Time: 7/21/2022 11:43 AM  Airway not difficult    General Information and Staff    Patient location during procedure: OR  Anesthesiologist: Jan Jessica MD  CRNA/CAA: Mariia Lou CRNA    Indications and Patient Condition  Indications for airway management: airway protection    Preoxygenated: yes  MILS not maintained throughout  Mask difficulty assessment: 1 - vent by mask    Final Airway Details  Final airway type: endotracheal airway      Successful airway: ETT  Cuffed: yes   Successful intubation technique: direct laryngoscopy  Endotracheal tube insertion site: oral  Blade: Adry  Blade size: 3  ETT size (mm): 7.0  Cormack-Lehane Classification: grade I - full view of glottis  Placement verified by: chest auscultation and capnometry   Cuff volume (mL): 7  Measured from: lips  ETT/EBT  to lips (cm): 21  Number of attempts at approach: 1  Assessment: lips, teeth, and gum same as pre-op and atraumatic intubation    Additional Comments  Pt preoxygenated prior to induction, easy mask airway, atraumatic intubation,+ ETCO2, + bs bilat,  ETT secured and connected to ventilator.

## 2022-07-21 NOTE — BRIEF OP NOTE
CERVICAL DISCECTOMY ANTERIOR FUSION WITH INSTRUMENTATION  Progress Note    Jacqueline Coronel  7/21/2022    Pre-op Diagnosis:   Cervical disc disorder at C4-C5 level with radiculopathy [M50.121]  History of spinal fusion [Z98.1]  Left arm weakness [R29.898]       Post-Op Diagnosis Codes:     * Cervical disc disorder at C4-C5 level with radiculopathy [M50.121]     * History of spinal fusion [Z98.1]     * Left arm weakness [R29.898]    Procedure/CPT® Codes:        Procedure(s):  Anterior cervical discectomy and fusion with cage and plate, cervical four/five    Surgeon(s):  Shar Muñoz MD    Anesthesia: General    Staff:   Circulator: Radha Mann RN; Aubrie Prakash RN  Scrub Person: Martin Shi Heather  Vendor Representative: Rah Stark  Assistant: Jazmine Villalobos CSA  Orientee: Chepe De Anda RN  Assistant: Jazmine Villalobos CSA      Estimated Blood Loss: 25 cc    Urine Voided: none    Specimens:                none          Drains: * No LDAs found *    Findings: disc extrusion to the left        Complications: none    Assistant: Jazmine Villalobos CSA  was responsible for performing the following activities: Retraction, Suction, Irrigation, Suturing, Closing and Placing Dressing and their skilled assistance was necessary for the success of this case.    Shar Muñoz MD     Date: 7/21/2022  Time: 13:36 EDT

## 2022-07-22 VITALS
HEART RATE: 75 BPM | BODY MASS INDEX: 21.49 KG/M2 | WEIGHT: 136.91 LBS | SYSTOLIC BLOOD PRESSURE: 106 MMHG | TEMPERATURE: 97.4 F | DIASTOLIC BLOOD PRESSURE: 60 MMHG | RESPIRATION RATE: 16 BRPM | HEIGHT: 67 IN | OXYGEN SATURATION: 98 %

## 2022-07-22 PROCEDURE — 25010000002 CEFAZOLIN PER 500 MG: Performed by: NEUROLOGICAL SURGERY

## 2022-07-22 PROCEDURE — 99024 POSTOP FOLLOW-UP VISIT: CPT | Performed by: NURSE PRACTITIONER

## 2022-07-22 PROCEDURE — G0378 HOSPITAL OBSERVATION PER HR: HCPCS

## 2022-07-22 PROCEDURE — 25010000002 DEXAMETHASONE PER 1 MG: Performed by: NEUROLOGICAL SURGERY

## 2022-07-22 RX ORDER — HYDROCODONE BITARTRATE AND ACETAMINOPHEN 5; 325 MG/1; MG/1
1 TABLET ORAL EVERY 6 HOURS PRN
Qty: 24 TABLET | Refills: 0 | Status: SHIPPED | OUTPATIENT
Start: 2022-07-22 | End: 2022-07-28

## 2022-07-22 RX ORDER — METHYLPREDNISOLONE 4 MG/1
TABLET ORAL
Qty: 21 TABLET | Refills: 0 | Status: SHIPPED | OUTPATIENT
Start: 2022-07-22 | End: 2022-08-02

## 2022-07-22 RX ADMIN — DEXAMETHASONE SODIUM PHOSPHATE 4 MG: 4 INJECTION, SOLUTION INTRAMUSCULAR; INTRAVENOUS at 05:06

## 2022-07-22 RX ADMIN — DEXAMETHASONE SODIUM PHOSPHATE 4 MG: 4 INJECTION, SOLUTION INTRAMUSCULAR; INTRAVENOUS at 00:01

## 2022-07-22 RX ADMIN — HYDROCODONE BITARTRATE AND ACETAMINOPHEN 1 TABLET: 5; 325 TABLET ORAL at 00:01

## 2022-07-22 RX ADMIN — BACLOFEN 10 MG: 10 TABLET ORAL at 00:01

## 2022-07-22 RX ADMIN — FLUOXETINE HYDROCHLORIDE 40 MG: 20 CAPSULE ORAL at 09:09

## 2022-07-22 RX ADMIN — HYDROCODONE BITARTRATE AND ACETAMINOPHEN 1 TABLET: 5; 325 TABLET ORAL at 13:26

## 2022-07-22 RX ADMIN — CEFAZOLIN 2 G: 10 INJECTION, POWDER, FOR SOLUTION INTRAVENOUS at 00:01

## 2022-07-22 RX ADMIN — HYDROCODONE BITARTRATE AND ACETAMINOPHEN 1 TABLET: 5; 325 TABLET ORAL at 05:06

## 2022-07-22 RX ADMIN — HYDROCODONE BITARTRATE AND ACETAMINOPHEN 1 TABLET: 5; 325 TABLET ORAL at 09:09

## 2022-07-22 RX ADMIN — Medication 10 ML: at 09:09

## 2022-07-22 RX ADMIN — CEFAZOLIN 2 G: 10 INJECTION, POWDER, FOR SOLUTION INTRAVENOUS at 09:09

## 2022-07-22 NOTE — DISCHARGE SUMMARY
Jacqueline Coronel  1980    Patient Care Team:  Steff Keys MD as PCP - General (Internal Medicine)  Steff Keys MD as Referring Physician (Internal Medicine)  Ramon Trinh MD as Consulting Physician (Hematology and Oncology)    Date of Admit: 7/21/2022    Date of Discharge:  7/22/2022    Discharge Diagnosis:  Cervical disc disorder at C4-C5 level with radiculopathy    Left arm weakness    History of spinal fusion      Procedures Performed  Procedure(s):  Anterior cervical discectomy and fusion with cage and plate, cervical four/five       Complications: none    Consultants:   Consults     No orders found for last 30 day(s).          Condition on Discharge: stable      Discharge disposition: home    Pertinent Test Results: none    Brief HPI: Patient evaluated in office for complaints of neck pain around her surgery and down her left arm.  She was unable to lift her left arm and reported numbness and tingling in her right hand. Imaging revealed fairly substantial adjacent level cervical disc herniation at C4-C5.  Risks and benefits of treatment were discussed including the above procedure and she consented.    Hospital Course: Patient admitted for postoperative observation following ACDF at C4-5. The procedure itself was without complication. The patient was transferred to an orthopedic surgical floor following recovery.  She has done very well postoperatively, she has been placed on steroids and will go home on a Medrol Dosepak.  Left arm numbness and tingling has subsided though she does report some pain. Overall feels 90% better. She has tolerated a diet.  She has been given directions on restrictions and limitations. She will need to remain in a soft collar. She is more than ready to go home and should follow up with Dr. Muñoz in 2 weeks- our office will call to set this up.    Discharge Physical Exam:    Temp:  [97.4 °F (36.3 °C)-98.9 °F (37.2 °C)] 97.4 °F (36.3 °C)  Heart Rate:  [] 75  Resp:   [16-18] 16  BP: (106-127)/(60-83) 106/60    Current labs:  Lab Results (last 24 hours)     ** No results found for the last 24 hours. **            General Appearance No acute distress   Neurological Awake, Alert, and oriented x 3   Cranial nerves CN II-XII intact   Motor Strength 5/5 bilaterally, tone normal   Sensory Intact to light touch   Gait and station up ad amber   Neck  transverse surgical incision well approximated, Steri-Strips intact. supple, trachea midline.       Extremities Moves all extremities well, no edema, no cyanosis, no redness         Discharge Medications  Roberto has been reviewed and narcotic consent is on file in the patient's chart.     Your medication list      START taking these medications      Instructions Last Dose Given Next Dose Due   methylPREDNISolone 4 MG dose pack  Commonly known as: MEDROL      Take as directed on package instructions.          CHANGE how you take these medications      Instructions Last Dose Given Next Dose Due   baclofen 10 MG tablet  Commonly known as: LIORESAL  What changed:   · when to take this  · reasons to take this      TAKE 1 TABLET BY MOUTH THREE TIMES A DAY       HYDROcodone-acetaminophen 5-325 MG per tablet  Commonly known as: NORCO  What changed: reasons to take this      Take 1 tablet by mouth Every 6 (Six) Hours As Needed for Moderate Pain  for up to 6 days.          CONTINUE taking these medications      Instructions Last Dose Given Next Dose Due   acetaminophen 325 MG tablet  Commonly known as: TYLENOL      Take 2 tablets by mouth Every 4 (Four) Hours As Needed for Mild Pain .       butalbital-acetaminophen-caffeine -40 MG capsule capsule  Commonly known as: ORBIVAN      Take 1 capsule by mouth Every 4 (Four) Hours As Needed.       FLUoxetine 40 MG capsule  Commonly known as: PROzac      Take 40 mg by mouth Every Morning.       fluticasone 50 MCG/ACT nasal spray  Commonly known as: FLONASE      1 spray into the nostril(s) as directed by  provider As Needed.       gabapentin 100 MG capsule  Commonly known as: NEURONTIN      Take 200 mg by mouth Every Night.       lidocaine 5 %  Commonly known as: LIDODERM      Place 1 patch on the skin as directed by provider Daily.       metoprolol succinate XL 50 MG 24 hr tablet  Commonly known as: TOPROL-XL      Take 50 mg by mouth Every Night.       ondansetron 4 MG tablet  Commonly known as: ZOFRAN      Take 4 mg by mouth Every 8 (Eight) Hours As Needed for Nausea or Vomiting.          STOP taking these medications    Chlorhexidine Gluconate Cloth 2 % pads              Where to Get Your Medications      These medications were sent to [facility]/pharmacy #[id number] - [place], [place] - [address] [place] [address] - [phone]  - [phone] [address] [place] [address]    Hours: 24-hours Phone: [phone]   · HYDROcodone-acetaminophen 5-325 MG per tablet  · methylPREDNISolone 4 MG dose pack         Discharge Diet:   Diet Instructions     Diet: Soft Texture; Thin Liquids, No Restrictions; Whole      Discharge Diet: Soft Texture    Fluid Consistency: Thin Liquids, No Restrictions    Soft Options: Whole        Diet Order   Procedures   • Diet Regular       Activity at Discharge:   Activity Instructions     Activity as Tolerated      Other Activity Instructions      Activity Instructions: Please see attached restrictions.          Call for: questions or concerns    Follow-up Appointments  No future appointments.   Follow-up Information     Steff Keys MD .    Specialty: Internal Medicine  Contact information:  [address] [facility] [address]  [place] [id number] [phone]             Shar Muñoz MD. Schedule an appointment as soon as possible for a visit in 2 week(s).    Specialty: Neurosurgery  Contact information:  [address] [facility] [address]  [place] [id number] [phone]                       Additional Instructions for the Follow-ups that You Need to Schedule     Discharge Follow-up with Specified  "Provider: ELIE; 2 Weeks   As directed      To: ELIE    Follow Up: 2 Weeks               Test Results Pending at Discharge     None    I discussed the discharge instructions with patient, nursing staff and Dr. Toni Segundo, APRN  07/22/22  15:49 EDT    45 min spent in reviewing records, discussion and examination of the patient and discussion with other members of the patient's medical team.    \"Dictated utilizing Dragon dictation\".      "

## 2022-07-22 NOTE — CASE MANAGEMENT/SOCIAL WORK
Discharge Planning Assessment  [facility]     Patient Name: Jacqueline Coronel  MRN: [id number]  Today's Date: 7/22/2022    Admit Date: 7/21/2022     Discharge Needs Assessment     Row Name 07/22/22 1110       Living Environment    People in Home spouse    Name(s) of People in Home Storm Coronel    Current Living Arrangements home    Primary Care Provided by self    Provides Primary Care For no one    Family Caregiver if Needed spouse;sibling(s)    Family Caregiver Names Storm & Ramya    Quality of Family Relationships helpful;involved;supportive    Able to Return to Prior Arrangements yes       Resource/Environmental Concerns    Resource/Environmental Concerns none    Transportation Concerns none       Transition Planning    Patient/Family Anticipates Transition to home    Patient/Family Anticipated Services at Transition none    Transportation Anticipated family or friend will provide       Discharge Needs Assessment    Readmission Within the Last 30 Days no previous admission in last 30 days    Equipment Currently Used at Home none    Concerns to be Addressed no discharge needs identified;denies needs/concerns at this time    Anticipated Changes Related to Illness none    Equipment Needed After Discharge none    Provided Post Acute Provider List? N/A    N/A Provider List Comment Patient to discharge home    Provided Post Acute Provider Quality & Resource List? N/A    N/A Quality & Resource List Comment Patient to discharge home               Discharge Plan     Row Name 07/22/22 1111       Plan    Plan Home    Patient/Family in Agreement with Plan yes    Plan Comments CCP met with patient and her sister Ramya in her room, introduced self and explained role. Patient confirmed the demographic information on her face sheet is accurate. Patient states that she lives at home with her  Storm. Patient confirmed her PCP is Steff Keys. 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 Patient denies  history of HH or SNF. Patient denies using any DME. Patient states she has 2 stairs to enter her home from the garage with a handrail on the right hand side. Patient is enrolled in the [facility] M[facility] program. Patient denies any needs and states her friend can transport her home.              Continued Care and Services - Admitted Since 7/21/2022    Coordination has not been started for this encounter.       Expected Discharge Date and Time     Expected Discharge Date Expected Discharge Time    Jul 22, 2022          Demographic Summary     Row Name 07/22/22 1109       General Information    Admission Type observation    Arrived From PACU/recovery room    Reason for Consult discharge planning    Preferred Language English               Functional Status     Row Name 07/22/22 1109       Functional Status    Usual Activity Tolerance good    Current Activity Tolerance good       Functional Status, IADL    Medications independent    Meal Preparation independent    Housekeeping independent    Laundry independent    Shopping independent       Mental Status    General Appearance WDL WDL       Mental Status Summary    Recent Changes in Mental Status/Cognitive Functioning no changes       Employment/    Employment Status employed part-time               Psychosocial    No documentation.                Abuse/Neglect    No documentation.                Legal    No documentation.                Substance Abuse    No documentation.                Patient Forms    No documentation.

## 2022-07-22 NOTE — PLAN OF CARE
Goal Outcome Evaluation:  Plan of Care Reviewed With: patient        Progress: improving  Outcome Evaluation: patient ambulating independently to bathroom and in hallway, pain controlled with PO medication, voiding without difficulty, educated on b/p monitoring

## 2022-07-22 NOTE — DISCHARGE INSTRUCTIONS
Shar Muñoz M.D., F.A.C.S    Cervical Discectomy  Post-Operative Instructions      You should have a post-operative visit scheduled with the Physician Assistant or Nurse  Practitioner for approximately 2 to 2 ½ weeks after surgery.  If you do not have an appointment,  call the office when you return home to schedule one.  You will remain off work at least until  your first visit.    No lifting overhead, although you may place your arms above your head.  DO NOT lift anything  over five (5) pounds.  Walking is allowed and encouraged.  There are no restrictions on sitting  or climbing stairs, but refrain from overly strenuous activity.  Do not drive until the first visit,  although you may be driven.  In general, activity restrictions will be lessened following the first  visit.     Refrain from any sexual activity until after your first evaluation with the Physician Assistant or  Nurse Practitioner.      You may receive a cervical collar.  Please wear this at all times until the first visit, except while  in the shower.  During showering, refrain from moving your neck from side to side or forward  and backward.    Take your dressings off and leave them off after the first day at home.  You may get the  incisions wet during showering, but be sure to pat them dry, do not soak the incisions or rub  them vigorously with a towel.  The incision should be cleaned three times daily with hydrogen  peroxide unless otherwise directed by the nurse or physician.  Do not remove the steri strips; it  is okay if they fall off on their own.    A prescription for pain medication will be provided at discharge.  This medication is primarily for  any pain related to the incision.  Sometimes antibiotics and medication for spasms may be  given.  Please avoid anti-inflammatories such as Ibuprofen, Aspirin, Motrin or Advil as these  medications may interfere with the fusion process. If a refill of your pain medication is required,  due  to changes in federal law, in order to have this medication refilled you must contact the  office four days prior to the due date and make arrangements to pick-up the prescription in our  office. The prescription refill cannot be called in to the pharmacy. Your prescription will be ready  for pick-up the day the refill is due.     Notify the office if there are any problems, such as excessive neck or arm pain, persistent  temperature of 100 degrees or greater that is not relieved by Tylenol.  A small amount of  bleeding from the incision during the first few days is not unusual.  Also, mild hoarseness of  voice and mild difficulty swallowing are not unusual during the first two weeks.  Please call the  office if there is excessive bleeding, redness, heat or swelling around the incision or increased  difficulty swallowing.      We look forward to seeing you again in follow-up.  If in doubt about anything regarding your  surgery or if you have concerns, please don’t hesitate to call.  We would rather you  communicate with us regarding such issues early.    Thank You    .

## 2022-07-25 ENCOUNTER — TELEPHONE (OUTPATIENT)
Dept: NEUROSURGERY | Facility: CLINIC | Age: 42
End: 2022-07-25

## 2022-07-25 NOTE — TELEPHONE ENCOUNTER
----- Message from ELIE Henriquez sent at 7/22/2022  2:43 PM EDT -----  Regarding: f/u  please schedule a follow up appointment with APC in 2 weeks

## 2022-07-25 NOTE — TELEPHONE ENCOUNTER
Patient had a C4-C5 ACDF with cage and plate on 07-21-22. She called today complaining of numbness that starts at her bottom lip that to below the incision on her neck. She said she did not experience this with her last surgery. She is also complaining of numbness on the left side of her face around her cheek. She denies any incisional problems. She is still having some difficulty swallowing but it is not abnormal. She did state that she feels much better post-operatively.

## 2022-07-25 NOTE — CASE MANAGEMENT/SOCIAL WORK
Case Management Discharge Note      Final Note: Home    Provided Post Acute Provider List?: N/A  N/A Provider List Comment: Patient to discharge home  Provided Post Acute Provider Quality & Resource List?: N/A  N/A Quality & Resource List Comment: Patient to discharge home    Selected Continued Care - Discharged on 7/22/2022 Admission date: 7/21/2022 - Discharge disposition: Home or Self Care    Destination    No services have been selected for the patient.              Durable Medical Equipment    No services have been selected for the patient.              Dialysis/Infusion    No services have been selected for the patient.              Home Medical Care    No services have been selected for the patient.              Therapy    No services have been selected for the patient.              Community Resources    No services have been selected for the patient.              Community & DME    No services have been selected for the patient.                       Final Discharge Disposition Code: 01 - home or self-care

## 2022-07-26 NOTE — TELEPHONE ENCOUNTER
"Per Dr. Muñoz, \"Tell her the incision is higher than it was before and can affect the lower lip and even the lower side of the face but the numbness and tingling will be transient and it will improve.\"    Called and LVM for the patient advising of this.   "

## 2022-07-29 NOTE — TELEPHONE ENCOUNTER
I spoke with Ms. Coronel she reports she needs to be scheduled preferably on a Wednesday or Thursday.

## 2022-08-02 ENCOUNTER — OFFICE VISIT (OUTPATIENT)
Dept: NEUROSURGERY | Facility: CLINIC | Age: 42
End: 2022-08-02

## 2022-08-02 ENCOUNTER — HOSPITAL ENCOUNTER (OUTPATIENT)
Dept: MRI IMAGING | Facility: HOSPITAL | Age: 42
Discharge: HOME | End: 2022-08-02

## 2022-08-02 ENCOUNTER — TELEPHONE (OUTPATIENT)
Dept: NEUROSURGERY | Facility: CLINIC | Age: 42
End: 2022-08-02

## 2022-08-02 VITALS
TEMPERATURE: 97.7 F | SYSTOLIC BLOOD PRESSURE: 130 MMHG | HEART RATE: 92 BPM | HEIGHT: 67 IN | WEIGHT: 136.91 LBS | BODY MASS INDEX: 21.49 KG/M2 | OXYGEN SATURATION: 99 % | DIASTOLIC BLOOD PRESSURE: 80 MMHG

## 2022-08-02 DIAGNOSIS — Z98.890 H/O CERVICAL SPINE SURGERY: Primary | ICD-10-CM

## 2022-08-02 DIAGNOSIS — Z98.890 H/O CERVICAL SPINE SURGERY: ICD-10-CM

## 2022-08-02 PROCEDURE — 0 GADOBENATE DIMEGLUMINE 529 MG/ML SOLUTION

## 2022-08-02 PROCEDURE — 99024 POSTOP FOLLOW-UP VISIT: CPT

## 2022-08-02 PROCEDURE — 70553 MRI BRAIN STEM W/O & W/DYE: CPT

## 2022-08-02 PROCEDURE — A9577 INJ MULTIHANCE: HCPCS

## 2022-08-02 RX ADMIN — GADOBENATE DIMEGLUMINE 12 ML: 529 INJECTION, SOLUTION INTRAVENOUS at 17:23

## 2022-08-02 NOTE — NURSING NOTE
1735   Arrived to radiology triage as Stat hold and call.    1750  Per Dr Painter who spoke to Neurosurgery stated it's ok for patient to go home.    1755 Directed patient to main lobby to exit.

## 2022-08-02 NOTE — PROGRESS NOTES
Subjective   Patient ID: Jacqueline Coronel is a 42 y.o. female is here today for follow-up cervical disc disorder at C4-C5 with radiculopathy and left arm weakness. She is status post ACDF at C4-C5 on 7/21/22 by Dr. Muñoz.     Ms. Coronel reports she is feeling better since her surgery. She is still having some muscle spasms in her deltoids. She is having a lot of discomfort in between her shoulder blades to the point she is feeling a stabbing sensation. She is have numbness from her chin down. She reports she is having trouble sleeping and believes that it is due to the decadron that she was being given in the hospital. She was also given a MDP that she did complete but is concerned because she is still not sleeping well. She reports her hydrocodone is not helping with her pain. She denies any redness, swelling or drainage at her incision. She also denies any fever or chills. She does want to know about any restrictions that she has since her surgery.     History of Present Illness    42-year-old female who is 2 weeks s/p C4-5 ACDF with Dr. Muñoz.  States overall she is feeling better.  Completed her Medrol Dosepak, however, she feels as though the steroids have increased her anxiety and prevented her from sleeping.  States she is having numbness from her chin down.  Reports slurred speech and a facial droop since her surgery that her family has also commented on to her.  Denies any new onset weakness.  Complains of a muscle pain between her shoulder blades as well as muscle spasms in her bilateral deltoids.  Reports taking her Norco at night because it helps her get comfortable.  Feels as though she is ready to get started with physical therapy.    Review of Systems   Constitutional: Negative for chills and fever.   HENT: Negative for trouble swallowing.    Musculoskeletal: Positive for neck pain.   Skin: Negative for wound.   Neurological: Positive for facial asymmetry, speech difficulty and numbness.  "Negative for weakness.   Psychiatric/Behavioral: Positive for sleep disturbance.     Jacqueline Coronel  reports that she quit smoking about 14 years ago. Her smoking use included electronic cigarette. She has a 2.50 pack-year smoking history. She has never used smokeless tobacco.     Tobacco Use: Medium Risk   • Smoking Tobacco Use: Former Smoker   • Smokeless Tobacco Use: Never Used         Objective     Vitals:    08/02/22 1404   BP: 130/80   Pulse: 92   Temp: 97.7 °F (36.5 °C)   SpO2: 99%   Weight: 62.1 kg (136 lb 14.5 oz)   Height: 170.2 cm (67\")     Body mass index is 21.44 kg/m².      Physical Exam  Vitals reviewed.   Constitutional:       General: She is not in acute distress.     Appearance: Normal appearance. She is normal weight. She is not ill-appearing, toxic-appearing or diaphoretic.   HENT:      Head: Normocephalic and atraumatic.   Eyes:      Pupils: Pupils are equal, round, and reactive to light.   Neck:      Comments:   Cervical incision edges well approximated without erythema or drainage.  Some bumps present under incision but area around incision is soft and not discolored.  Neurological:      Mental Status: She is alert and oriented to person, place, and time.      Gait: Gait is intact.      Deep Tendon Reflexes:      Reflex Scores:       Bicep reflexes are 2+ on the right side and 2+ on the left side.       Brachioradialis reflexes are 2+ on the right side and 2+ on the left side.  Psychiatric:         Speech: Speech is slurred.       Neurologic Exam     Mental Status   Oriented to person, place, and time.   Follows 3 step commands.   Attention: normal. Concentration: normal.   Speech: slurred   Level of consciousness: alert  Knowledge: good.     Cranial Nerves     CN III, IV, VI   Pupils are equal, round, and reactive to light.  Right pupil: Size: 4 mm. Shape: regular. Reactivity: brisk. Consensual response: intact.   Left pupil: Size: 4 mm. Shape: regular. Reactivity: brisk. Consensual response: " intact.   Nystagmus: none   Diplopia: none  Ophthalmoparesis: none    CN VII   Right facial weakness: none  Left facial weakness: central    CN VIII   Hearing: intact    CN XI   Right trapezius strength: normal  Left trapezius strength: normal    Motor Exam   Muscle bulk: normal  Overall muscle tone: normal  Right arm tone: normal  Left arm tone: normal  Right leg tone: normal  Left leg tone: normal    Strength   Right biceps: 5/5  Left biceps: 5/5  Right triceps: 5/5  Left triceps: 5/5  Right interossei: 5/5  Left interossei: 5/5    Sensory Exam   Right arm light touch: normal  Left arm light touch: normal    Gait, Coordination, and Reflexes     Gait  Gait: normal    Reflexes   Right brachioradialis: 2+  Left brachioradialis: 2+  Right biceps: 2+  Left biceps: 2+  Right Rogel: absent  Left Rogel: absent          Assessment & Plan     Medical Decision Makin-year-old female who presents for her 2-week follow-up appointment after her C4-5 ACDF.  States her preoperative symptoms have improved.  Admits to difficulty sleeping and increased anxiety from her steroids.  Reports noticing a facial droop and slurred speech following surgery.  States her family is also commented on these findings.  I discussed her complaints of facial droop and slurred speech Dr. Muñoz and we have proceeded with a stat brain MRI.  In my order, I instructed radiology to call with the result to on-call neurosurgery provider and hold patient pending the result.  As for her postoperative state, she is okay to begin physical therapy.  I discussed with her that she can slowly increase how much she is lifting and now lift up to 15 pounds as well as slowly incorporate mild nonrepetitive overhead activities in moderation.  She is anxious to go back to work and planning to return on Monday, we discussed starting slow and trying not to bend her neck too much at work or lift anything greater than 15 pounds again.  Patient states she  started driving already and has not had any issues.  I counseled her on not driving while taking any narcotics.  We will see her back in 4 weeks for a 6-week postoperative visit.     Diagnoses and all orders for this visit:    1. H/O cervical spine surgery (Primary)  -     MRI Brain With & Without Contrast; Future  -     Ambulatory Referral to Physical Therapy POST OP, Evaluate and treat      Return in about 4 weeks (around 8/30/2022) for 6 week post-operative visit.

## 2022-08-02 NOTE — TELEPHONE ENCOUNTER
PT SAW ROSA MARIA IN OFFICE 8/2/22 AND PER ROSA MARIA   Return in about 4 weeks (around 8/30/2022) for 6 week post-operative visit.

## 2022-08-03 ENCOUNTER — TELEPHONE (OUTPATIENT)
Dept: NEUROSURGERY | Facility: CLINIC | Age: 42
End: 2022-08-03

## 2022-08-03 ENCOUNTER — TREATMENT (OUTPATIENT)
Dept: PHYSICAL THERAPY | Facility: CLINIC | Age: 42
End: 2022-08-03

## 2022-08-03 DIAGNOSIS — M54.2 CERVICALGIA: ICD-10-CM

## 2022-08-03 DIAGNOSIS — Z98.890 H/O CERVICAL SPINE SURGERY: Primary | ICD-10-CM

## 2022-08-03 PROCEDURE — 97530 THERAPEUTIC ACTIVITIES: CPT | Performed by: PHYSICAL THERAPIST

## 2022-08-03 PROCEDURE — 97162 PT EVAL MOD COMPLEX 30 MIN: CPT | Performed by: PHYSICAL THERAPIST

## 2022-08-03 NOTE — TELEPHONE ENCOUNTER
Patient would like you to call her regarding her follow up appointment with Dr. Muñoz tomorrow. She states she spoke to Swati GILBERT but still has a few unanswered questions. She would like to speak directly to you.

## 2022-08-03 NOTE — TELEPHONE ENCOUNTER
Pt was seen yesterday for a 2 wk p/o C4/5 ACDF and was sent for a Stat/hold and call MRI.  Would like to know the results.  And she wants to know if it is OK to start PT?  [phone]

## 2022-08-03 NOTE — TELEPHONE ENCOUNTER
----- Message from Evita Quevedo PA-C sent at 8/3/2022  1:12 PM EDT -----  This is the patient to schedule for an appointment tomorrow. Thank you!

## 2022-08-04 ENCOUNTER — OFFICE VISIT (OUTPATIENT)
Dept: NEUROSURGERY | Facility: CLINIC | Age: 42
End: 2022-08-04

## 2022-08-04 ENCOUNTER — TELEPHONE (OUTPATIENT)
Dept: NEUROSURGERY | Facility: CLINIC | Age: 42
End: 2022-08-04

## 2022-08-04 VITALS
HEART RATE: 72 BPM | TEMPERATURE: 97.8 F | WEIGHT: 136.91 LBS | HEIGHT: 67 IN | DIASTOLIC BLOOD PRESSURE: 80 MMHG | SYSTOLIC BLOOD PRESSURE: 116 MMHG | OXYGEN SATURATION: 98 % | BODY MASS INDEX: 21.49 KG/M2

## 2022-08-04 DIAGNOSIS — Z48.89 POSTOPERATIVE VISIT: Primary | ICD-10-CM

## 2022-08-04 DIAGNOSIS — R29.810 FACIAL WEAKNESS: ICD-10-CM

## 2022-08-04 PROCEDURE — 99024 POSTOP FOLLOW-UP VISIT: CPT | Performed by: NEUROLOGICAL SURGERY

## 2022-08-04 NOTE — TELEPHONE ENCOUNTER
DR GUTIERREZ SAW PT TODAY AND PER DR GUTIERREZ Return in about 1 week (around 8/11/2022) for Face-to-face between cases. HE DID NOT ORDER ANY NEW IMAGING OR REFERRALS FOR THE PT. PT DID NOT WISH TO STAY SO THAT I COULD SPEAK WITH YOU ABOUT AN APPT FOR NEXT WEEK SAYING SHE HAS SOMEONE WAITING OUTSIDE FOR HER.

## 2022-08-04 NOTE — TELEPHONE ENCOUNTER
"Stephany Quevedo spoke with the patient, per her review \"I called the patient and reviewed her MRI results with her. I recommended she follow up with our office in 4 weeks but call for worsening facial droop, facial weakness, or speech changes. I recommended she follow up with her PCP for the sinus mucous retention cyst. She is okay to resume PT, I informed her of this as well.\"    I called her and answered her questions. She will see Dr. Muñoz in the office today for further review.   "

## 2022-08-04 NOTE — PROGRESS NOTES
Subjective   Patient ID: Jacqueline Coronel is a 42 y.o. female is here today for follow-up after brain MRI. She had a STAT brain MRI performed due to complaints of facial drop and slurred speech following her surgery.     Ms. Coronel reports her symptoms have not changed. She is here to discuss the brain MRI that she had. She would like to know when she can start the vitamin E.     Its been about 2 weeks from her ACDF at C4-C5.  The left shoulder and arm are doing well.  She was telling us that she had some weakness on the left side of her face and some decreased sensation in a bit of tingling and a weak right lower lip.  She was seen by one of our PAs and a stat MRI was done which did not show any evidence of any infarct.  When I examined her today I really do not see a lot of facial asymmetry and her eyes closed well.  She I think describes it more as a numb and tingly feeling.  Not convinced that this is something like Bell's palsy.  There is a little bit of diminished sensation in the V2 distribution.  There is some numbness around the upper cervical incision and I think that probably affects the right lower lip but I am not quite sure what to make of the left side of the face.  I talked about trying some steroids but she reacted negatively to the IV steroids we gave during the surgery and she could not sleep at night so she was reluctant to take it.  The incision actually is healing well.  We decided mutually to take a wait-and-see approach and have her come back to see me in 1 week.  If there is not much improvement we might send her to neurology to sort this out.  But I do not think the facial complaints are related to the surgery per se.    History of Present Illness    The following portions of the patient's history were reviewed and updated as appropriate: allergies, current medications, past family history, past medical history, past social history, past surgical history and problem list.    Review of Systems  "  Constitutional: Negative for chills and fever.   Musculoskeletal: Negative for neck pain.   Skin: Negative for wound.   Neurological: Positive for speech difficulty and numbness. Negative for weakness.   All other systems reviewed and are negative.          Objective     Vitals:    08/04/22 1205   BP: 116/80   Pulse: 72   Temp: 97.8 °F (36.6 °C)   SpO2: 98%   Weight: 62.1 kg (136 lb 14.5 oz)   Height: 170.2 cm (67\")     Body mass index is 21.44 kg/m².      Physical Exam  Constitutional:       Appearance: She is well-developed.   HENT:      Head: Normocephalic and atraumatic.   Eyes:      Extraocular Movements: EOM normal.      Conjunctiva/sclera: Conjunctivae normal.      Pupils: Pupils are equal, round, and reactive to light.   Neck:      Vascular: No carotid bruit.   Neurological:      Mental Status: She is oriented to person, place, and time.      Coordination: Finger-Nose-Finger Test and Heel to Shin Test normal.      Gait: Gait is intact.      Deep Tendon Reflexes:      Reflex Scores:       Tricep reflexes are 2+ on the right side and 2+ on the left side.       Bicep reflexes are 2+ on the right side and 2+ on the left side.       Brachioradialis reflexes are 2+ on the right side and 2+ on the left side.       Patellar reflexes are 2+ on the right side and 2+ on the left side.       Achilles reflexes are 2+ on the right side and 2+ on the left side.  Psychiatric:         Speech: Speech normal.       Neurologic Exam     Mental Status   Oriented to person, place, and time.   Registration of memory: Good recent and remote memory.   Attention: normal. Concentration: normal.   Speech: speech is normal   Level of consciousness: alert  Knowledge: consistent with education.     Cranial Nerves     CN II   Visual fields full to confrontation.   Visual acuity: normal    CN III, IV, VI   Pupils are equal, round, and reactive to light.  Extraocular motions are normal.     CN V   Facial sensation intact.   Right corneal " reflex: normal  Left corneal reflex: normal    CN VII   Facial expression full, symmetric.   Right facial weakness: none  Left facial weakness: none    CN VIII   Hearing: intact    CN IX, X   Palate: symmetric    CN XI   Right sternocleidomastoid strength: normal  Left sternocleidomastoid strength: normal    CN XII   Tongue: not atrophic  Tongue deviation: none    Motor Exam   Muscle bulk: normal  Right arm tone: normal  Left arm tone: normal  Right leg tone: normal  Left leg tone: normal    Strength   Strength 5/5 except as noted.     Sensory Exam   Light touch normal.     Gait, Coordination, and Reflexes     Gait  Gait: normal    Coordination   Finger to nose coordination: normal  Heel to shin coordination: normal    Reflexes   Right brachioradialis: 2+  Left brachioradialis: 2+  Right biceps: 2+  Left biceps: 2+  Right triceps: 2+  Left triceps: 2+  Right patellar: 2+  Left patellar: 2+  Right achilles: 2+  Left achilles: 2+  Right : 2+  Left : 2+          Assessment & Plan   Independent Review of Radiographic Studies:      I personally reviewed the images from the following studies.    The brain MRI done on 8/2/2022 shows no signs of any acute infarction.  There is some signs of maxillary sinus disease.  Agree with the report.        Medical Decision Making:      We will take a wait-and-see approach.  No steroids for now.  Will have her come back in 1 week.  If is not any significant change, we might consider referral to neurology to sort this out.  I do not think it is related directly to her recent surgery.      Diagnoses and all orders for this visit:    1. Postoperative visit (Primary)    2. Facial weakness      Return in about 1 week (around 8/11/2022) for Face-to-face between cases.

## 2022-08-10 ENCOUNTER — TELEPHONE (OUTPATIENT)
Dept: NEUROSURGERY | Facility: CLINIC | Age: 42
End: 2022-08-10

## 2022-08-10 ENCOUNTER — TREATMENT (OUTPATIENT)
Dept: PHYSICAL THERAPY | Facility: CLINIC | Age: 42
End: 2022-08-10

## 2022-08-10 DIAGNOSIS — R29.810 FACIAL WEAKNESS: Primary | ICD-10-CM

## 2022-08-10 DIAGNOSIS — Z98.890 H/O CERVICAL SPINE SURGERY: Primary | ICD-10-CM

## 2022-08-10 DIAGNOSIS — M54.2 CERVICALGIA: ICD-10-CM

## 2022-08-10 PROCEDURE — 97530 THERAPEUTIC ACTIVITIES: CPT | Performed by: PHYSICAL THERAPIST

## 2022-08-10 PROCEDURE — 97140 MANUAL THERAPY 1/> REGIONS: CPT | Performed by: PHYSICAL THERAPIST

## 2022-08-10 NOTE — TELEPHONE ENCOUNTER
Patient called stated still not better . Left side of face is numb. Patient is inquiry if Dr. Muñoz would be willing to do labs or any imaging before appointment tomorrow. Patient is concerned since this is not like last recovery. Please advise presley on what next steps are for her care.

## 2022-08-10 NOTE — PROGRESS NOTES
Physical Therapy Daily Treatment Note    Patient: Jacqueline Coronel  : 1980  Referring Practitioner: Evita Dyer*  Date of Initial Visit: Type: THERAPY  Noted: 8/3/2022  Today's Date: 8/10/2022  Patient seen for 2 sessions    Visit Diagnoses:     ICD-10-CM ICD-9-CM   1. H/O cervical spine surgery  Z98.890 V45.89   2. Cervicalgia  M54.2 723.1       Subjective   Jacqueline Coronel reports that her facial weakness is getting worse, the left side of the face is tingling more and has right sided numbness on the right lower face. Patient reports that the cervicogenic headaches are getting worse. The pain continues to creep up the neck and back of head. Patient reports that washing her face will light the nerve pain up and is more painful.  Pain Ratin    Objective     Increased facial asymmetry with lips drooping on left  Slurred speech  Diminished sensation to light touch (R>L): anterior cervical spine, chin, lower lip, and cheeks    See Exercise, Manual, and Modality Logs for complete treatment.     Patient Education: objective findings    Assessment & Plan     Assessment    Assessment details: Patient presents to therapy today with increased facial asymmetry (L>R) with increased drooping of lips and slurred speech. Patient with diminished sensation to light touch in the anterior neck, chin, lower lips, and cheeks; greater on the right versus left. Patient reports increased cervicogenic headaches and pain, tolerated manual therapy well. Did demonstrate increased hypertonicity of the suboccipitals and cervical extensors.         Progress per Plan of Care and Progress strengthening /stabilization /functional activity          Timed:         Manual Therapy:    35     mins  47230;     Therapeutic Exercise:         mins  38581;     Neuromuscular Micky:        mins  81660;    Therapeutic Activity:     10     mins  35087;     Gait Training:           mins  82863;     Ultrasound:          mins  54301;    Ionto                                    mins   51187  Self Care                            mins   56980  Canalith Repos                   mins  64273    Un-Timed:  Electrical Stimulation:         mins  70707 ( );  Dry Needling          mins   Traction          mins 04682    Timed Treatment:   45   mins   Total Treatment:     45   mins      Tricia Jimenez PT  Physical Therapist  [place] License # [id number]

## 2022-08-11 RX ORDER — METHYLPREDNISOLONE 4 MG/1
TABLET ORAL
Qty: 21 TABLET | Refills: 0 | Status: SHIPPED | OUTPATIENT
Start: 2022-08-11 | End: 2022-09-19

## 2022-08-11 NOTE — TELEPHONE ENCOUNTER
"Per Dr. Muñoz, \"Go ahead and give MDP and see if Louie or one of the APC's in neurology can see her as soon as possible. We've ruled out a stroke, and frankly I don't think there's anything lifethreatening going on. But her level of anxiety if quite high and it would be better for them to see her asap. Go ahead and cancel visit with me today.\"   "

## 2022-08-11 NOTE — TELEPHONE ENCOUNTER
"Per Dr. Muñoz, \"The plan was if she was not better for her to see Neurology ASAP to see if this is some form of Bell's Palsy. If she's doing ok from the surgery, which she was last week, then we can skip today's visit with me and see if we can get one of the neurologists or APRNs to see as soon as possible. Ask her if she wants to do that, meaning skip the visit with me today and go straight to neurology.\"    I called and spoke with the patient and advised of Dr. Muñoz's message. She states that she knows that there is something going on and she would like for us to prescribe a medrol dosepak for her, send her to neurology and skip the appointment with us today.       "

## 2022-08-17 ENCOUNTER — TREATMENT (OUTPATIENT)
Dept: PHYSICAL THERAPY | Facility: CLINIC | Age: 42
End: 2022-08-17

## 2022-08-17 DIAGNOSIS — M50.123 CERVICAL DISC DISORDER AT C6-C7 LEVEL WITH RADICULOPATHY: ICD-10-CM

## 2022-08-17 DIAGNOSIS — M54.2 CERVICALGIA: ICD-10-CM

## 2022-08-17 DIAGNOSIS — Z98.890 H/O CERVICAL SPINE SURGERY: Primary | ICD-10-CM

## 2022-08-17 PROCEDURE — 97140 MANUAL THERAPY 1/> REGIONS: CPT | Performed by: PHYSICAL THERAPIST

## 2022-08-17 PROCEDURE — 97530 THERAPEUTIC ACTIVITIES: CPT | Performed by: PHYSICAL THERAPIST

## 2022-08-17 NOTE — PROGRESS NOTES
"Physical Therapy Daily Treatment Note    Patient: Jacqueline Coronel  : 1980  Referring Practitioner: Evita Dyer*  Date of Initial Visit: Type: THERAPY  Noted: 8/3/2022  Today's Date: 2022  Patient seen for: 3 sessions      Visit Diagnoses:     ICD-10-CM ICD-9-CM   1. H/O cervical spine surgery  Z98.890 V45.89   2. Cervicalgia  M54.2 723.1   3. Cervical disc disorder at C6-C7 level with radiculopathy  M50.123 722.91     723.4       Subjective   Jacqueline Coronel reports: her facial nerve \"zingers\" aren't as bad as they have been and seem more positional in nature. Also reports that the facial droop is not as pronounced as it was, but does remain with facial asymetry due to droop. Numbness is unchanged at this point.     Pain Level (0-10): does not give any numerical response to pain, grimaces with active movement at times.    Objective     See Exercise, Manual, and Modality Logs for complete treatment.     Patient Education: HEP of gentle chin tuck and cervical rotation.     Assessment & Plan     Assessment    Assessment details: Continued with gentle MFR of cervical paraspinals, sub occipitals and lymph massage of anterior neck. Followed with initiation of gentle cervical ROM/Stretching ex's to be performed at home. Denies any increase in pain and does not report any significant reduction in pain/symptoms at end of session.      Progress per Plan of Care           Timed:         Manual Therapy:    35     mins  57481;     Therapeutic Exercise:         mins  95867;     Neuromuscular Micky:        mins  02439;    Therapeutic Activity:    10      mins  38876;     Gait Training:           mins  60415;     Ultrasound:          mins  68532;    Ionto                                   mins   15853  Self Care                            mins   59304      Un-Timed:  Electrical Stimulation:         mins  09766 ( );  Traction          mins 79784      Timed Treatment:  45    mins   Total Treatment:     45   " mins      Jacqueline Bajwa \[facility]\""  Physical Therapist Assistant  [place] License: [id number]

## 2022-08-23 ENCOUNTER — TELEPHONE (OUTPATIENT)
Dept: PHYSICAL THERAPY | Facility: CLINIC | Age: 42
End: 2022-08-23

## 2022-08-25 ENCOUNTER — TREATMENT (OUTPATIENT)
Dept: PHYSICAL THERAPY | Facility: CLINIC | Age: 42
End: 2022-08-25

## 2022-08-25 DIAGNOSIS — M54.2 CERVICALGIA: ICD-10-CM

## 2022-08-25 DIAGNOSIS — Z98.890 H/O CERVICAL SPINE SURGERY: Primary | ICD-10-CM

## 2022-08-25 PROCEDURE — 97140 MANUAL THERAPY 1/> REGIONS: CPT | Performed by: PHYSICAL THERAPIST

## 2022-08-25 PROCEDURE — 97530 THERAPEUTIC ACTIVITIES: CPT | Performed by: PHYSICAL THERAPIST

## 2022-08-25 NOTE — PROGRESS NOTES
Physical Therapy Daily Treatment Note    Patient: Jacqueline Coronel  : 1980  Referring Practitioner: Evita Dyer*  Date of Initial Visit: Type: THERAPY  Noted: 8/3/2022  Today's Date: 2022  Patient seen for 4 sessions    Visit Diagnoses:     ICD-10-CM ICD-9-CM   1. H/O cervical spine surgery  Z98.890 V45.89   2. Cervicalgia  M54.2 723.1       Subjective   Jacqueline Coronel reports that she continues to have cervicogenic headaches have been really bad lately. She has been up since 5am and tried to do an epsom salt bath to help with pain. Pain is running of the neck into the base of the skull. Patient reports she has had absolutely no relief from the steroids.  Pain Ratin    Objective     See Exercise, Manual, and Modality Logs for complete treatment.     Patient Education: HEP    Assessment & Plan     Assessment    Assessment details: The patient presents to therapy today with increased reports of headache and cervical spine pain, especially at the base of the skull. Patient with continued slurred speech and facial drooping on L. She reports hypersensitivity and pain in the trigeminal nerve distribution. Patient tolerated manual therapy well, did demonstrate hypertonicity along the BL cervical extensors and suboccipitals (R>L). Patient with increased tingling into the right index finger with repeated chin tucks, this exercise was deferred. Patient with extreme fatigue and reports of pain at today's visit.        Progress per Plan of Care and Progress strengthening /stabilization /functional activity          Timed:         Manual Therapy:    30     mins  13005;     Therapeutic Exercise:         mins  04636;     Neuromuscular Micky:        mins  22785;    Therapeutic Activity:     8     mins  01582;     Gait Training:           mins  23675;     Ultrasound:          mins  30716;    Ionto                                   mins   54307  Self Care                            mins   36858  [facility]                    mins  93460    Un-Timed:  Electrical Stimulation:         mins  39922 ( );  Dry Needling          mins   Traction          mins 91490    Timed Treatment:   38   mins   Total Treatment:     38   mins      Tricia Jimenez PT  Physical Therapist  [place] License # [id number]

## 2022-08-29 ENCOUNTER — TELEPHONE (OUTPATIENT)
Dept: NEUROSURGERY | Facility: CLINIC | Age: 42
End: 2022-08-29

## 2022-08-29 NOTE — TELEPHONE ENCOUNTER
Called the patient to see what kind of second opinion she is seeking? I asked her if it was about her recent surgery with Dr. Muñoz? She said yes and that she thinks more levels should have been done as she is still having problems. I explained to her that she is barely a month out from surgery. It takes time for symptoms to resolve. I told her typically our physicians will not see anyone within 6 months to a year out from surgery but that I will ask both physicians if that is what she wish's. She said that will leave a bad taste in Dr. Muñoz's mouth if she is going and getting another opinion. She then said to forget everything and she will just continue with Dr. Muñoz's recommendations as it was nothing he did wrong. Her surgery went well, she just thinks more levels should have been done with her last surgery. She has changed her mind and no longer wish's to get the 2nd opinion. She will call us back if she changes her mind.

## 2022-08-29 NOTE — TELEPHONE ENCOUNTER
Provider: LOUIE  Caller: CAROLYN  Relationship to Patient: SELF    Phone Number: [phone]   Reason for Call:  PT CALLED REQUESTING A 2ND OPINION BY DR PALMA. SHE IS CURRENTLY A PT OF DR Muñoz, SHE STATED THAT SHE IS JUST WANTING A FRESH SET OF YES     PLEASE CALL PT AND ADVISE     THANK YOU

## 2022-08-31 ENCOUNTER — TREATMENT (OUTPATIENT)
Dept: PHYSICAL THERAPY | Facility: CLINIC | Age: 42
End: 2022-08-31

## 2022-08-31 DIAGNOSIS — Z98.890 H/O CERVICAL SPINE SURGERY: Primary | ICD-10-CM

## 2022-08-31 DIAGNOSIS — M54.2 CERVICALGIA: ICD-10-CM

## 2022-08-31 DIAGNOSIS — M50.123 CERVICAL DISC DISORDER AT C6-C7 LEVEL WITH RADICULOPATHY: ICD-10-CM

## 2022-08-31 PROCEDURE — 97530 THERAPEUTIC ACTIVITIES: CPT | Performed by: PHYSICAL THERAPIST

## 2022-08-31 PROCEDURE — 97140 MANUAL THERAPY 1/> REGIONS: CPT | Performed by: PHYSICAL THERAPIST

## 2022-08-31 NOTE — PROGRESS NOTES
Physical Therapy Daily Treatment Note    Patient: Jacqueline Coronel  : 1980  Referring Practitioner: Evita Dyer*  Date of Initial Visit: Type: THERAPY  Noted: 8/3/2022  Today's Date: 2022  Patient seen for 5 sessions    Visit Diagnoses:     ICD-10-CM ICD-9-CM   1. H/O cervical spine surgery  Z98.890 V45.89   2. Cervicalgia  M54.2 723.1   3. Cervical disc disorder at C6-C7 level with radiculopathy  M50.123 722.91     723.4       Subjective   Jacqueline Coronel reports that when she moves her head at all her right index finger goes numb. She reports that she moved her head into extension a little quicker and her whole right arm when numb. Patient reports that she continues to have headaches in the back of the head. She reports that headaches have changed and intensified since surgery.  Pain Ratin    Objective     Asymmetry of the eyelids: ptosis of left eye  CN VII: abnormal with asymmetry (L vs R) with smiling and raising eyebrows  Facial numbness along the chin, lips, and cheeks   Cervical extension: causes numbness in R upper extremity  Palpation of R upper trapezius: causes numbness in R index finger    See Exercise, Manual, and Modality Logs for complete treatment.     Patient Education: post surgical care, objective findings    Assessment & Plan     Assessment    Assessment details: The patient presents to therapy today with increased parasthesias and numbness in the right upper extremity. Patient demonstrated tingling into the R index finger with palpation of the right upper trap and tingling/numbness in the RUE with cervical ROM (especially extension). Patient with continued facial numbness and asymmetry with the L>R. Patient also with reports of increased headache and frequency of headaches post operatively. Patient and PT discussed changes in status following surgery, PT highly recommends further imaging and evaluation at this time due to worsening of symptoms.         Progress per Plan  of Care and Progress strengthening /stabilization /functional activity          Timed:         Manual Therapy:    25     mins  48192;     Therapeutic Exercise:         mins  81508;     Neuromuscular Micky:        mins  39577;    Therapeutic Activity:     15     mins  82296;     Gait Training:           mins  70684;     Ultrasound:          mins  00864;    Ionto                                   mins   12124  Self Care                            mins   95269  Canalith Repos                   mins  91310    Un-Timed:  Electrical Stimulation:         mins  39608 ( );  Dry Needling          mins   Traction          mins 76367    Timed Treatment:   40   mins   Total Treatment:     40   mins      Tricia Jimenez PT  Physical Therapist  [place] License # [id number]

## 2022-09-01 ENCOUNTER — TRANSCRIBE ORDERS (OUTPATIENT)
Dept: ADMINISTRATIVE | Facility: HOSPITAL | Age: 42
End: 2022-09-01

## 2022-09-01 DIAGNOSIS — Z12.31 SCREENING MAMMOGRAM FOR BREAST CANCER: Primary | ICD-10-CM

## 2022-09-07 ENCOUNTER — TREATMENT (OUTPATIENT)
Dept: PHYSICAL THERAPY | Facility: CLINIC | Age: 42
End: 2022-09-07

## 2022-09-07 DIAGNOSIS — M50.123 CERVICAL DISC DISORDER AT C6-C7 LEVEL WITH RADICULOPATHY: ICD-10-CM

## 2022-09-07 DIAGNOSIS — M54.2 CERVICALGIA: ICD-10-CM

## 2022-09-07 DIAGNOSIS — Z98.890 H/O CERVICAL SPINE SURGERY: Primary | ICD-10-CM

## 2022-09-07 PROCEDURE — 97140 MANUAL THERAPY 1/> REGIONS: CPT | Performed by: PHYSICAL THERAPIST

## 2022-09-07 PROCEDURE — 97530 THERAPEUTIC ACTIVITIES: CPT | Performed by: PHYSICAL THERAPIST

## 2022-09-07 NOTE — PROGRESS NOTES
Physical Therapy Progress Note    Patient: Jacqueline Coronel  : 1980  Referring Practitioner: Evita Dyer*  Date of Initial Visit: Type: THERAPY  Noted: 8/3/2022  Today's Date: 2022  Patient seen for 6 sessions    Visit Diagnoses:     ICD-10-CM ICD-9-CM   1. H/O cervical spine surgery  Z98.890 V45.89   2. Cervicalgia  M54.2 723.1   3. Cervical disc disorder at C6-C7 level with radiculopathy  M50.123 722.91     723.4       Subjective   Jacqueline Coronel reports that walking incline, stairs, and hard surfaces tends to aggravate facial tingling/numbness that starts at the cheek bone and goes up the temple. Her ptosis of the eye seems to be worse when the tingling is worse. The index finger and NOW the thumb is getting worse and going numb. She reports she's had a harder time writing due to this numbness. Headaches continue to be frequent and more painful than prior to surgery.  Pain Ratin  NDI: 23  Objective     Asymmetry of the eyelids: ptosis of left eye  CN VII: abnormal with asymmetry (L vs R) with smiling and raising eyebrows  Facial numbness along the chin, lips, and cheeks   Cervical extension: causes numbness in R upper extremity  Palpation of R upper trapezius: causes numbness in R index finger  See Exercise, Manual, and Modality Logs for complete treatment.     Patient Education: post op care, objective findings    Assessment & Plan     Assessment    Assessment details: The patient has been seen for 6 session of PT at this time, demonstrates changes in cranial nerve function compared to pre-surgical objective findings. Patient with recently worsening symptoms of numbness/tingling into the right index finger and thumb and increased frequency and severity of headaches. She would continue to benefit from skilled PT to address remaining functional limitations and impairments and to improve patient quality of life.    Goals  Plan Goals: STG's: 2 weeks   Patient will report a reduction in pain by  >25%  Patient will be able to perform HEP with minimal verbal cues     LTG's: By discharge   Patient will report a reduction in pain by >75%  Patient will have an improvement on the Neck Disability Index to demonstrate overall improvement in daily functional level  Patient will be able to reach into overhead cabinet to get a dish without pain or difficulty  Patient will be independent with undergarment dressing without pain   Patient will be able to tolerate sitting > 45 minutes without increased pain  Patient will demonstrate sufficient cervical AROM to allow patient to turn her head to view blindspots when driving  Patient will be able to sleep > 5 hours without waking in pain   Patient will be independent with HEP        Progress per Plan of Care and Progress strengthening /stabilization /functional activity          Timed:         Manual Therapy:    25     mins  32485;     Therapeutic Exercise:         mins  74103;     Neuromuscular Micky:        mins  30483;    Therapeutic Activity:     15     mins  53350;     Gait Training:           mins  59070;     Ultrasound:          mins  53746;    Ionto                                   mins   92955  Self Care                            mins   49806  Canalith Repos                   mins  81717    Un-Timed:  Electrical Stimulation:         mins  31964 ( );  Dry Needling          mins   Traction          mins 72265    Timed Treatment:   40   mins   Total Treatment:     40   mins      Tricia Jimenez PT  Physical Therapist  [place] License # [id number]

## 2022-09-13 ENCOUNTER — HOSPITAL ENCOUNTER (OUTPATIENT)
Dept: MAMMOGRAPHY | Facility: HOSPITAL | Age: 42
Discharge: HOME | End: 2022-09-13
Admitting: INTERNAL MEDICINE

## 2022-09-13 DIAGNOSIS — Z12.31 SCREENING MAMMOGRAM FOR BREAST CANCER: ICD-10-CM

## 2022-09-13 PROCEDURE — 77067 SCR MAMMO BI INCL CAD: CPT

## 2022-09-13 PROCEDURE — 77063 BREAST TOMOSYNTHESIS BI: CPT

## 2022-09-14 ENCOUNTER — TELEPHONE (OUTPATIENT)
Dept: PHYSICAL THERAPY | Facility: CLINIC | Age: 42
End: 2022-09-14

## 2022-09-16 NOTE — PROGRESS NOTES
"Subjective   Patient ID: Jacqueline Coronel is a 42 y.o. female is here today for follow-up cervical disc disorder at C4-C5 with radiculopathy and left arm weakness. She is status post ACDF at C4-C5 on 7/21/22 by Dr. Muñoz. She also underwent C6-7 ACDF in July 2020.    History of Present Illness  Today, Ms. Coronel reports posterior neck pain that radiates up into her scalp along with right arm pain. She reports certain head movements will cause increased numbness in her right thumb and index finger. She reports she is having more frequent headaches along with mild difficulty swallowing where she has to lower her chin to her chest for full swallowing capability, but does report that these have improved since surgery. She is currently going to PT once a week. She was recently evaluated by Dr. 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 She continues to have left sided facial numbness thought to be 2/2 large left-sided polyp. She is scheduled to see. Dr. Miguel Angel Palma with neurology in November for the ongoing left-sided facial weakness for further evaluation. She is currently on Gabapentin 600 mg QHS. She denies any new symptoms at this time.         The following portions of the patient's history were reviewed and updated as appropriate: allergies, current medications, past medical history, past social history, past surgical history and problem list.    Review of Systems   Musculoskeletal: Positive for neck pain.   Neurological: Positive for weakness ( left facial), numbness ( Left facial) and headaches.        Right arm pain       Objective     Vitals:    09/19/22 1526   BP: 114/72   Pulse: 82   Temp: 97.8 °F (36.6 °C)   SpO2: 99%   Weight: 61.7 kg (136 lb)   Height: 170.2 cm (67\")     Body mass index is 21.3 kg/m².      Physical Exam  Vitals reviewed.   Constitutional:       General: She is awake. " She is not in acute distress.     Appearance: Normal appearance. She is well-developed. She is not ill-appearing, toxic-appearing or diaphoretic.   HENT:      Head: Normocephalic and atraumatic.      Nose: Nose normal.      Mouth/Throat:      Mouth: Mucous membranes are moist.   Eyes:      Extraocular Movements: Extraocular movements intact.   Pulmonary:      Effort: Pulmonary effort is normal.   Musculoskeletal:      Cervical back: Decreased range of motion.   Skin:     General: Skin is warm and dry.      Comments: Well healed anterior cervical incision.    Neurological:      Mental Status: She is alert and oriented to person, place, and time.      Gait: Gait is intact.      Deep Tendon Reflexes: Strength normal.   Psychiatric:         Mood and Affect: Mood normal.         Speech: Speech normal.         Behavior: Behavior normal. Behavior is cooperative.         Thought Content: Thought content normal.         Judgment: Judgment normal.       Neurologic Exam     Mental Status   Oriented to person, place, and time.   Attention: normal. Concentration: normal.   Speech: speech is normal   Level of consciousness: alert  Knowledge: good.   Normal comprehension.     Cranial Nerves   Cranial nerves II through XII intact.     Motor Exam   Muscle bulk: normal  Overall muscle tone: normal    Strength   Strength 5/5 throughout.     Sensory Exam   -Reports numbness in the right thumb and index finger      Gait, Coordination, and Reflexes     Gait  Gait: normal          Assessment & Plan   Independent Review of Radiographic Studies:      I personally reviewed the images from the following studies:  No new imaging    Medical Decision Making:      Patient is here today for postop follow-up.  She is status post C4-5 ACDF on 7/21/2022 with Dr. Muñoz.  She underwent C6-7 ACDF in July 2021.  Today, she states that things are going okay.  She is having some right arm pain and some decreased sensation in the right thumb and index  finger.  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  She states that she is on gabapentin 600 mg at night but the prescription is written for 900 mg.  I told her to try taking the full dose to see if this would help improve her symptoms.  She is currently doing PT once a week but at this point I feel like she would most likely benefit from at least twice a week for continued range of motion and strengthening exercises.  She has an upcoming sinus surgery on October 19 to remove a large left sinus polyp, thought to be causing her left sided facial numbness.  After speaking with Dr. Muñoz concerning this, we feel that it would be okay to proceed with this surgery.  We will follow-up with her in about 8 weeks to see how things are going.  Advised her to call with any new or worsening symptoms.  She verbalized understanding and is in agreement with the above plan.     Diagnoses and all orders for this visit:    1. S/P neck surgery, follow-up exam (Primary)    2. Radicular pain in right arm    3. Facial weakness      Return in about 8 weeks (around 11/14/2022) for With APC, Recheck.

## 2022-09-19 ENCOUNTER — HOSPITAL ENCOUNTER (OUTPATIENT)
Dept: MAMMOGRAPHY | Facility: HOSPITAL | Age: 42
Discharge: HOME | End: 2022-09-19

## 2022-09-19 ENCOUNTER — OFFICE VISIT (OUTPATIENT)
Dept: NEUROSURGERY | Facility: CLINIC | Age: 42
End: 2022-09-19

## 2022-09-19 ENCOUNTER — TELEPHONE (OUTPATIENT)
Dept: PHYSICAL THERAPY | Facility: CLINIC | Age: 42
End: 2022-09-19

## 2022-09-19 ENCOUNTER — HOSPITAL ENCOUNTER (OUTPATIENT)
Dept: ULTRASOUND IMAGING | Facility: HOSPITAL | Age: 42
Discharge: HOME | End: 2022-09-19

## 2022-09-19 VITALS
HEIGHT: 67 IN | WEIGHT: 136 LBS | SYSTOLIC BLOOD PRESSURE: 114 MMHG | BODY MASS INDEX: 21.35 KG/M2 | OXYGEN SATURATION: 99 % | TEMPERATURE: 97.8 F | HEART RATE: 82 BPM | DIASTOLIC BLOOD PRESSURE: 72 MMHG

## 2022-09-19 DIAGNOSIS — Z09 S/P NECK SURGERY, FOLLOW-UP EXAM: Primary | ICD-10-CM

## 2022-09-19 DIAGNOSIS — N64.89 BREAST ASYMMETRY: ICD-10-CM

## 2022-09-19 DIAGNOSIS — M79.2 RADICULAR PAIN IN RIGHT ARM: ICD-10-CM

## 2022-09-19 DIAGNOSIS — R29.810 FACIAL WEAKNESS: ICD-10-CM

## 2022-09-19 PROCEDURE — 77065 DX MAMMO INCL CAD UNI: CPT

## 2022-09-19 PROCEDURE — 99024 POSTOP FOLLOW-UP VISIT: CPT | Performed by: NURSE PRACTITIONER

## 2022-09-19 PROCEDURE — G0279 TOMOSYNTHESIS, MAMMO: HCPCS

## 2022-09-19 RX ORDER — TRAZODONE HYDROCHLORIDE 50 MG/1
50-100 TABLET ORAL NIGHTLY PRN
COMMUNITY
Start: 2022-09-06

## 2022-09-19 RX ORDER — NEBIVOLOL 5 MG/1
5 TABLET ORAL DAILY
COMMUNITY
Start: 2022-09-01

## 2022-09-19 RX ORDER — FLUOXETINE HYDROCHLORIDE 20 MG/1
60 CAPSULE ORAL EVERY MORNING
COMMUNITY
Start: 2022-08-09 | End: 2022-09-19

## 2022-09-19 RX ORDER — BUPROPION HYDROCHLORIDE 150 MG/1
150 TABLET, EXTENDED RELEASE ORAL 2 TIMES DAILY
COMMUNITY
Start: 2022-08-09

## 2022-09-19 RX ORDER — GABAPENTIN 300 MG/1
CAPSULE ORAL
COMMUNITY
Start: 2022-09-07 | End: 2022-11-23 | Stop reason: SDUPTHER

## 2022-09-20 ENCOUNTER — APPOINTMENT (OUTPATIENT)
Dept: MAMMOGRAPHY | Facility: HOSPITAL | Age: 42
End: 2022-09-20

## 2022-09-20 ENCOUNTER — APPOINTMENT (OUTPATIENT)
Dept: ULTRASOUND IMAGING | Facility: HOSPITAL | Age: 42
End: 2022-09-20

## 2022-09-20 ENCOUNTER — TELEPHONE (OUTPATIENT)
Dept: NEUROSURGERY | Facility: CLINIC | Age: 42
End: 2022-09-20

## 2022-09-21 ENCOUNTER — TREATMENT (OUTPATIENT)
Dept: PHYSICAL THERAPY | Facility: CLINIC | Age: 42
End: 2022-09-21

## 2022-09-21 DIAGNOSIS — Z98.890 H/O CERVICAL SPINE SURGERY: Primary | ICD-10-CM

## 2022-09-21 DIAGNOSIS — M54.2 CERVICALGIA: ICD-10-CM

## 2022-09-21 PROCEDURE — 97140 MANUAL THERAPY 1/> REGIONS: CPT | Performed by: PHYSICAL THERAPIST

## 2022-09-21 PROCEDURE — 97530 THERAPEUTIC ACTIVITIES: CPT | Performed by: PHYSICAL THERAPIST

## 2022-09-21 NOTE — PROGRESS NOTES
"Physical Therapy Daily Treatment Note    Patient: Jacqueline Coronel  : 1980  Referring Practitioner: Evita Dyer*  Date of Initial Visit: Type: THERAPY  Noted: 8/3/2022  Today's Date: 2022  Patient seen for 7 sessions    Visit Diagnoses:     ICD-10-CM ICD-9-CM   1. H/O cervical spine surgery  Z98.890 V45.89   2. 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 They believe that the cyst is causing the left sided facial weakness and tingling. She will be having sinus surgery in 2022. The NP believes that the tingling into the right hand is neuritis and she has been cleared to take NSAIDs.  Pain Rating: 3    Objective     See Exercise, Manual, and Modality Logs for complete treatment.     Patient Education: objective progress, inflammation post op    Assessment & Plan     Assessment    Assessment details: The patient presents to therapy today with lower resting pain, demonstrated hypertonicity along the BL UT and levator scap which responded well to MFR. Patient able to complete chin tucks today with no increase in tingling into the hand. However, did demonstrate discomfort and \"zinger\" pain into the left eye with cervical rotation, exercise was deferred.        Progress per Plan of Care and Progress strengthening /stabilization /functional activity          Timed:         Manual Therapy:    25     mins  44665;     Therapeutic Exercise:         mins  74352;     Neuromuscular Micky:        mins  19370;    Therapeutic Activity:     13     mins  33933;     Gait Training:           mins  23059;     Ultrasound:          mins  18514;    Ionto                                   mins   84136  Self Care                            mins   35155  Canalith Repos                   mins  12760    Un-Timed:  Electrical Stimulation:         mins  08546 ( );  Dry Needling          mins   Traction          mins " 91321    Timed Treatment:   38   mins   Total Treatment:     38   mins      Tricia Jimenez PT  Physical Therapist  [place] License # [id number]

## 2022-09-28 ENCOUNTER — TREATMENT (OUTPATIENT)
Dept: PHYSICAL THERAPY | Facility: CLINIC | Age: 42
End: 2022-09-28

## 2022-09-28 DIAGNOSIS — M54.2 CERVICALGIA: ICD-10-CM

## 2022-09-28 DIAGNOSIS — Z98.890 H/O CERVICAL SPINE SURGERY: Primary | ICD-10-CM

## 2022-09-28 PROCEDURE — 97140 MANUAL THERAPY 1/> REGIONS: CPT | Performed by: PHYSICAL THERAPIST

## 2022-09-28 PROCEDURE — 97110 THERAPEUTIC EXERCISES: CPT | Performed by: PHYSICAL THERAPIST

## 2022-09-28 NOTE — PROGRESS NOTES
Physical Therapy Daily Treatment Note    Patient: Jacqueline Coronel  : 1980  Referring Practitioner: Evita Dyer*  Date of Initial Visit: Type: THERAPY  Noted: 8/3/2022  Today's Date: 2022  Patient seen for 8 sessions    Visit Diagnoses:     ICD-10-CM ICD-9-CM   1. H/O cervical spine surgery  Z98.890 V45.89   2. Cervicalgia  M54.2 723.1       Subjective   Jacqueline Coronel reports that she has been moving, not lifting heavy objects, however, she reports that her symptoms in the neck and arm are not getting worse. They're not getting significantly better but not getting worse. She reports that her  is still weak, the index finger is very weak.  Pain Ratin    Objective     See Exercise, Manual, and Modality Logs for complete treatment.     Patient Education: referred pain from cervical spine    Assessment & Plan     Assessment    Assessment details: The patient demonstrated hypertonicity in the cervical extensors, which responded very well to MFR and light stretching. Patient was educated on gentle flexion stretch with chin tuck for improved discomfort in the posterior cervical spine.        Progress per Plan of Care and Progress strengthening /stabilization /functional activity          Timed:         Manual Therapy:    30     mins  98415;     Therapeutic Exercise:    8     mins  23301;     Neuromuscular Micky:        mins  31439;    Therapeutic Activity:          mins  58867;     Gait Training:           mins  07890;     Ultrasound:          mins  46539;    Ionto                                   mins   16818  Self Care                            mins   75979  Canalith Repos                   mins  06835    Un-Timed:  Electrical Stimulation:         mins  17425 ( );  Dry Needling          mins   Traction          mins 18276    Timed Treatment:   38   mins   Total Treatment:     38   mins      Tricia Jimenez PT  Physical Therapist  [place] License # [id number]

## 2022-10-05 ENCOUNTER — TREATMENT (OUTPATIENT)
Dept: PHYSICAL THERAPY | Facility: CLINIC | Age: 42
End: 2022-10-05

## 2022-10-05 DIAGNOSIS — M54.2 CERVICALGIA: ICD-10-CM

## 2022-10-05 DIAGNOSIS — M50.123 CERVICAL DISC DISORDER AT C6-C7 LEVEL WITH RADICULOPATHY: ICD-10-CM

## 2022-10-05 DIAGNOSIS — Z98.890 H/O CERVICAL SPINE SURGERY: Primary | ICD-10-CM

## 2022-10-05 PROCEDURE — 97140 MANUAL THERAPY 1/> REGIONS: CPT | Performed by: PHYSICAL THERAPIST

## 2022-10-05 PROCEDURE — 97530 THERAPEUTIC ACTIVITIES: CPT | Performed by: PHYSICAL THERAPIST

## 2022-10-05 NOTE — PROGRESS NOTES
Physical Therapy Progress Note    Patient: Jacqueline Coronel  : 1980  Referring Practitioner: Evita Dyer*  Date of Initial Visit: Type: THERAPY  Noted: 8/3/2022  Today's Date: 10/5/2022  Patient seen for 9 sessions    Visit Diagnoses:     ICD-10-CM ICD-9-CM   1. H/O cervical spine surgery  Z98.890 V45.89   2. Cervicalgia  M54.2 723.1   3. Cervical disc disorder at C6-C7 level with radiculopathy  M50.123 722.91     723.4       Subjective   Jacqueline Coronel reports that she has been slowly moving into her new place and was lifting very light objects. She was very tired after but did well with it. She feels she might be starting to be protective of her neck again.  Pain Ratin  NDI: 17  Objective          Palpation   Left   Hypertonic in the cervical paraspinals, levator scapulae, suboccipitals and upper trapezius.   Tenderness of the cervical paraspinals, levator scapulae, suboccipitals and upper trapezius.     Right   Hypertonic in the cervical paraspinals, levator scapulae, suboccipitals and upper trapezius. Tenderness of the cervical paraspinals, levator scapulae, suboccipitals and upper trapezius.         See Exercise, Manual, and Modality Logs for complete treatment.     Patient Education: dry needling for headaches    Assessment & Plan     Assessment    Assessment details: The patient has been seen for 9 session of PT at this time, is progressing well towards goals at this time. The patient demonstrates improved pain and ROM. However, she continues to demonstrate hypertonicity and tenderness in the neck which causes headaches. Patient and PT discussed dry needling options at this time to improve headache pain, patient was advised to contact referring physician for approval of dry needling. She would continue to benefit from skilled PT to address remaining functional limitations and impairments and to improve patient quality of life.    Goals  Plan Goals: STG's: 2 weeks   Patient will report a  reduction in pain by >25%-MET  Patient will be able to perform HEP with minimal verbal cues-MET     LTG's: By discharge   Patient will report a reduction in pain by >75%-NOT MET  Patient will have an improvement on the Neck Disability Index to demonstrate overall improvement in daily functional level-MET  Patient will be able to reach into overhead cabinet to get a dish without pain or difficulty-MET  Patient will be independent with undergarment dressing without pain-MET   Patient will be able to tolerate sitting > 45 minutes without increased pain-NOT MET  Patient will demonstrate sufficient cervical AROM to allow patient to turn her head to view blindspots when driving-NOT MET  Patient will be able to sleep > 5 hours without waking in pain-NOT MET   Patient will be independent with HEP-NOT MET        Progress per Plan of Care and Progress strengthening /stabilization /functional activity          Timed:         Manual Therapy:    30     mins  15529;     Therapeutic Exercise:         mins  05202;     Neuromuscular Micky:        mins  57328;    Therapeutic Activity:     10     mins  57620;     Gait Training:           mins  09017;     Ultrasound:          mins  89677;    Ionto                                   mins   88537  Self Care                            mins   43773  Canalith Repos                   mins  04732    Un-Timed:  Electrical Stimulation:         mins  53245 ( );  Dry Needling          mins   Traction          mins 59600    Timed Treatment:   40   mins   Total Treatment:     40   mins      Tricia Jimenez PT  Physical Therapist  [place] License # [id number]

## 2022-10-12 ENCOUNTER — TREATMENT (OUTPATIENT)
Dept: PHYSICAL THERAPY | Facility: CLINIC | Age: 42
End: 2022-10-12

## 2022-10-12 DIAGNOSIS — Z98.890 H/O CERVICAL SPINE SURGERY: Primary | ICD-10-CM

## 2022-10-12 DIAGNOSIS — M54.2 CERVICALGIA: ICD-10-CM

## 2022-10-12 PROCEDURE — 20560 NDL INSJ W/O NJX 1 OR 2 MUSC: CPT | Performed by: PHYSICAL THERAPIST

## 2022-10-12 PROCEDURE — 97530 THERAPEUTIC ACTIVITIES: CPT | Performed by: PHYSICAL THERAPIST

## 2022-10-12 NOTE — PROGRESS NOTES
Physical Therapy Daily Treatment Note  [address] [place] [address]    Patient: Jacqueline Coronel  : 1980  Referring Practitioner: Evita Dyer*  Date of Initial Visit: Type: THERAPY  Noted: 8/3/2022  Today's Date: 10/12/2022  Patient seen for 10 sessions    Visit Diagnoses:     ICD-10-CM ICD-9-CM   1. H/O cervical spine surgery  Z98.890 V45.89   2. Cervicalgia  M54.2 723.1       Subjective   Jacqueline Coronel reports that she feels very tight today. She states that she has been feeling good in a lot of ways and she is able to do more, however, she feels sore from doing things. The headaches are not changing, however. She would like to dry needle today.  Pain Rating: 3    Objective     See Exercise, Manual, and Modality Logs for complete treatment.     Patient Education: dry needling for headaches, TDN aftercare    Assessment & Plan     Assessment    Assessment details: Patient was given clearance per surgeon for dry needling, communication is in chart. Patient demonstrated active TrPs in the BL upper traps, seemingly contributing to headaches. Patient tolerated TDN well today, did have slight bleeding in the right upper trap which stopped immediately with pressure. Patient was given heat and education at end for aftercare.        Progress per Plan of Care and Progress strengthening /stabilization /functional activity          Timed:         Manual Therapy:         mins  86366;     Therapeutic Exercise:         mins  42438;     Neuromuscular Micky:        mins  28997;    Therapeutic Activity:     10     mins  56771;     Gait Training:           mins  24241;     Ultrasound:          mins  10950;    Ionto                                   mins   87081  Self Care                            mins   64528  Canalith Repos                   mins  51073    Un-Timed:  Electrical Stimulation:         mins  91256 ( );  Dry Needling    20      mins   Traction          mins  09724    Timed Treatment:   10   mins   Total Treatment:     40   mins      Tricia Jimenez PT  Physical Therapist  [place] License # [id number]

## 2022-10-19 ENCOUNTER — TREATMENT (OUTPATIENT)
Dept: PHYSICAL THERAPY | Facility: CLINIC | Age: 42
End: 2022-10-19

## 2022-10-19 DIAGNOSIS — M54.2 CERVICALGIA: ICD-10-CM

## 2022-10-19 DIAGNOSIS — Z98.890 H/O CERVICAL SPINE SURGERY: Primary | ICD-10-CM

## 2022-10-19 PROCEDURE — 97140 MANUAL THERAPY 1/> REGIONS: CPT | Performed by: PHYSICAL THERAPIST

## 2022-10-19 NOTE — PROGRESS NOTES
Physical Therapy Daily Treatment Note  [address] [place] [address]    Patient: Jacqueline Coronel  : 1980  Referring Practitioner: Evita Dyer*  Date of Initial Visit: Type: THERAPY  Noted: 8/3/2022  Today's Date: 10/19/2022  Patient seen for 11 sessions    Visit Diagnoses:     ICD-10-CM ICD-9-CM   1. H/O cervical spine surgery  Z98.890 V45.89   2. Cervicalgia  M54.2 723.1       Subjective   Jacqueline Coronel reports that the needling session was great, she was sore the next day and it got a little worse but by Friday evening got better. She is having sinus surgery on 10/21/2022 and will return to PT once cleared by surgeon.  Pain Ratin    Objective     See Exercise, Manual, and Modality Logs for complete treatment.     Patient Education: POC    Assessment & Plan     Assessment    Assessment details: Patient with improved mobility of the BL UT following TDN, however, continues to demonstrated active TrPs which could be causing headaches. PT to continue working MFR and TDN. Patient is having surgery on 10/21/2022, will hold PT until she is cleared by surgeon.        Progress per Plan of Care and Progress strengthening /stabilization /functional activity          Timed:         Manual Therapy:    40     mins  17425;     Therapeutic Exercise:         mins  78226;     Neuromuscular Micky:        mins  00491;    Therapeutic Activity:          mins  76497;     Gait Training:           mins  27483;     Ultrasound:          mins  94304;    Ionto                                   mins   48395  Self Care                            mins   42873  Canalith Repos                   mins  41950    Un-Timed:  Electrical Stimulation:         mins  74157 ( );  Dry Needling          mins   Traction          mins 78574    Timed Treatment:   40   mins   Total Treatment:     40   mins      Tricia Jimenez PT  Physical Therapist  [place] License # [id number]

## 2022-11-08 ENCOUNTER — TREATMENT (OUTPATIENT)
Dept: PHYSICAL THERAPY | Facility: CLINIC | Age: 42
End: 2022-11-08

## 2022-11-08 DIAGNOSIS — M50.123 CERVICAL DISC DISORDER AT C6-C7 LEVEL WITH RADICULOPATHY: ICD-10-CM

## 2022-11-08 DIAGNOSIS — Z98.890 H/O CERVICAL SPINE SURGERY: Primary | ICD-10-CM

## 2022-11-08 DIAGNOSIS — M54.2 CERVICALGIA: ICD-10-CM

## 2022-11-08 PROCEDURE — 97140 MANUAL THERAPY 1/> REGIONS: CPT | Performed by: PHYSICAL THERAPIST

## 2022-11-08 PROCEDURE — 97530 THERAPEUTIC ACTIVITIES: CPT | Performed by: PHYSICAL THERAPIST

## 2022-11-09 ENCOUNTER — OFFICE VISIT (OUTPATIENT)
Dept: NEUROLOGY | Facility: CLINIC | Age: 42
End: 2022-11-09

## 2022-11-09 VITALS
WEIGHT: 136.02 LBS | BODY MASS INDEX: 21.35 KG/M2 | HEIGHT: 67 IN | DIASTOLIC BLOOD PRESSURE: 60 MMHG | RESPIRATION RATE: 14 BRPM | HEART RATE: 68 BPM | SYSTOLIC BLOOD PRESSURE: 124 MMHG

## 2022-11-09 DIAGNOSIS — G43.909 MIGRAINE WITHOUT STATUS MIGRAINOSUS, NOT INTRACTABLE, UNSPECIFIED MIGRAINE TYPE: Primary | ICD-10-CM

## 2022-11-09 DIAGNOSIS — R51.9 FACIAL PAIN: ICD-10-CM

## 2022-11-09 DIAGNOSIS — M54.2 NECK PAIN: ICD-10-CM

## 2022-11-09 PROCEDURE — 99204 OFFICE O/P NEW MOD 45 MIN: CPT | Performed by: STUDENT IN AN ORGANIZED HEALTH CARE EDUCATION/TRAINING PROGRAM

## 2022-11-09 RX ORDER — RIMEGEPANT SULFATE 75 MG/75MG
75 TABLET, ORALLY DISINTEGRATING ORAL
Qty: 2 TABLET | Refills: 0 | COMMUNITY
Start: 2022-11-09

## 2022-11-09 NOTE — PROGRESS NOTES
Chief Complaint   Patient presents with   • Headache     Has 30 years plus headache ,cant take triptans at all gets low BP ,she has tension headache        Patient ID: Jacqueline Coronel is a 42 y.o. female.    HPI:    Ms. Coronel is a 42-year-old female who was referred to me by Dr. Shar Muñoz in neurosurgery for the reason of facial weakness. The referral was placed on 08/11/2022. A phone conversation on 8/10/2022 notes the left side of her face is numb. The plan per Dr. Muñoz was that if she was not better for her to see neurology to see if this is some form of Bell's palsy.      She had an ACDF at C4-C5 completed in 07/2022. Her left shoulder and arm are doing well. Per EMR review, she went home with a Medrol dosepak in 07/2022 after the surgery. She reported at her neurosurgery appointment symptoms of weakness on the left side of her face, some decreased sensation, and a bit of tingling and a weak right lower lip. She had an MRI of the brain completed on 08/02/2022 which showed no signs of any acute infarction. Per 08/02/2022 neurosurgery clinic note she reported noticing a facial droop and slurred speech following surgery.        There is a PT note from 08/10/2022 where she reported to PT that her facial weakness is getting worse. She also reported to the PT that the left side of her face is tingling more, and she has right sided numbness on the right lower face. She noted that washing her face will light the nerve pain up and is more painful. On 08/17/2022, she noticed some improvement in terms of her facial nerve “zingers,” and facial droop is not as pronounced as it was but does remain per the note. It looks like she may have received steroids treatment around 08/25/2022 which did not note a large help.        She had a sinus surgery scheduled for 10/21/2022 to remove a left sinus polyp thought to be potentially causing left facial numbness. She noted improvement of the facial pain and numbness  potentially with the sinus surgery. Per my MA, she is satisfied with her current headache regimen and is not interested in making changes today. The patient had a stat MRI completed which did not show any evidence of infarct. She is on gabapentin 300 mg 1 capsule by mouth everyday as needed for anxiety and then 2 capsules by mouth at bedtime. Per the EMR she takes a 600 mg total daily dose at night. When I look at the prescription drug monitoring program, she does not have anything prescribed. She lives in [place], [place].       Ms. Coronel reports that she noticed improvement following her sinus surgery. She endorses that she experiences symptoms of facial pain. She notes that the pain has decreased in intensity. The patient states that she notices her symptoms while chewing, talking, or washing her face. She expresses that she completed a dry needling procedure a week prior to her sinus surgery. She notes that the dry needling procedure was helpful and improved her symptoms.       The patient states that she started having headaches around 12-years-old. She reports that her headaches occur across her forehead and in front of her ears. She describes her symptoms as a tension headache with dizziness. The patient notes that her symptoms may progress to light sensitivity and nausea. She denies any symptoms of vomiting, watery eyes, nasal congestion, or diarrhea. She denies any symptoms of numbness or hemiplegia.      Ms. Coronel endorses that her regular headaches occur every 2 days and the intense headaches occur 3 times a month. She describes her headaches as a throbbing pain with aching behind her eyes and an occasional stabbing pain. The patient expresses concern about the frequency of Fioricet that she is taking to treat her headaches. She endorses that she has a history of adverse reactions to Triptan medications. The patient states that she stopped taking propranolol after noticing that the medication did  not relieve her symptoms.        Ms. Coronel reports a surgical history of septoplasty and rhinoplasty. She notes symptoms of swelling in the morning and left sided pressure. She denies any medical history of heart disease, chest pain, heart attack, or coronary artery disease. She denies any history of diabetes, high cholesterol, or stroke. The patient states that she has a history of hypertension. The patient endorses that she was prescribed gabapentin 600 mg at night and Pristiq by her nurse practitioner. She notes that she was initially prescribed gabapentin after her cervical surgery in 2020. She expresses that she notices improvement in her facial pain while taking gabapentin during the day. The patient states that Advil or Aleve normally causes her stomach to ache.     The following portions of the patient's history were reviewed and updated as appropriate: allergies, current medications, past family history, past medical history, past social history, past surgical history and problem list.    Review of Systems   Allergic/Immunologic: Negative for food allergies.   Neurological: Positive for dizziness and headaches. Negative for tremors, speech difficulty and light-headedness.   Hematological: Bruises/bleeds easily.   Psychiatric/Behavioral: Positive for decreased concentration and sleep disturbance. Negative for agitation and confusion. The patient is nervous/anxious.             Vitals:    11/09/22 0915   BP: 124/60   Pulse: 68   Resp: 14       Neurologic Exam     Mental Status   Speech: speech is normal   Level of consciousness: alert    Cranial Nerves     CN II   Visual fields full to confrontation.     CN III, IV, VI   Pupils are equal, round, and reactive to light.  Extraocular motions are normal.     CN V   Facial sensation intact.     CN VII   Facial expression full, symmetric.     CN VIII   Hearing: intact    CN IX, X   Palate: symmetric    CN XI   Right trapezius strength: normal  Left trapezius  strength: normal    CN XII   Tongue: not atrophic  Fasciculations: absent  Tongue deviation: none    Motor Exam   Muscle bulk: normal    Strength   Right deltoid: 5/5  Left deltoid: 5/5  Right biceps: 5/5  Left biceps: 5/5  Right triceps: 5/5  Left triceps: 5/5  Right iliopsoas: 5/5  Left iliopsoas: 5/5  Right quadriceps: 5/5  Left quadriceps: 5/5  Right hamstrin/5  Left hamstrin/5  Right anterior tibial: 5/5  Left anterior tibial: 5/5  Right gastroc: 5/5  Left gastroc: 5/5Grip 5 out of 5 bilaterally     Sensory Exam   Left arm light touch: normal  Right leg light touch: normal  Left leg light touch: normal  Right 2nd finger can feel pressure but not touch.     Gait, Coordination, and Reflexes     Coordination   Finger to nose coordination: normal  Heel to shin coordination: normal    Reflexes   Right brachioradialis: 2+  Left brachioradialis: 2+  Right biceps: 2+  Left biceps: 2+  Right patellar: 2+  Left patellar: 2+  Right achilles: 2+  Left achilles: 2+Toes neutral       Physical Exam  Eyes:      Extraocular Movements: EOM normal.      Pupils: Pupils are equal, round, and reactive to light.   Neurological:      Coordination: Finger-Nose-Finger Test and Heel to Shin Test normal.      Deep Tendon Reflexes:      Reflex Scores:       Bicep reflexes are 2+ on the right side and 2+ on the left side.       Brachioradialis reflexes are 2+ on the right side and 2+ on the left side.       Patellar reflexes are 2+ on the right side and 2+ on the left side.       Achilles reflexes are 2+ on the right side and 2+ on the left side.  Psychiatric:         Speech: Speech normal.         Procedures    Assessment/Plan:         Diagnoses and all orders for this visit:    1. Migraine without status migrainosus, not intractable, unspecified migraine type (Primary)  -     Cancel: Ambulatory Referral to Physical Therapy Evaluate and treat, Neuro; Stretching (neck after surgery), ROM, Strengthening  -     CT Angiogram Head;  Future  -     Ambulatory Referral to Physical Therapy Evaluate and treat, Neuro; Stretching (neck after surgery), ROM, Strengthening    2. Facial pain  -     CT Angiogram Head; Future    3. Neck pain  -     Cancel: Ambulatory Referral to Physical Therapy Evaluate and treat, Neuro; Stretching (neck after surgery), ROM, Strengthening  -     Ambulatory Referral to Physical Therapy Evaluate and treat, Neuro; Stretching (neck after surgery), ROM, Strengthening    Other orders  -     ubrogepant 100 MG tablet; Take 1 tablet by mouth Daily As Needed (migraine) for up to 1 day. Do not take in same day as other Ubrelvy dose. Do not take in same day as Nurtec  Dispense: 1 tablet; Refill: 0  -     ubrogepant 50 MG tablet; Take 1 tablet by mouth Daily As Needed (migraine) for up to 1 dose. Do not take in same day as other Ubrelvy dose. Do not take in same day as Nurtec  Dispense: 1 tablet; Refill: 0  -     Rimegepant Sulfate (Nurtec) 75 MG tablet dispersible tablet; Take 1 tablet by mouth Every Other Day As Needed (migraine). Do not take in same day as Ubrelvy dose.  Dispense: 2 tablet; Refill: 0    The patient will complete a CT angiogram to rule out aneurysm as cause of headache.  She will continue physical therapy. The patient was given samples of Ubrelvy and Nurtec.   She was advised avoid taking both medications in the same day as each other. The patient was encouraged to avoid vaping. She was advised to increase her water intake and maintain regular exercise. She was encouraged to maintain regular bedtimes and try to get 7 hours of sleep or more at night. Follow up in 3 months or sooner with any new or worsening symptoms. After visit, I took over her gabapentin Rx and increased dose by 300mg as her facial pain worsened.  Return in about 3 months (around 2/9/2023).       I spent 56 minutes caring for this patient on this date of service. This time includes time spent by me in the following activities as necessary: preparing  for the visit, reviewing tests, medical records and previous visits, obtaining and/or reviewing a separately obtained history, performing a medically appropriate exam and/or evaluation, counseling and educating the patient, and/or communicating with other healthcare professionals, documenting information in the medical record, independently interpreting results and communicating that information with the patient, and developing a medically appropriate treatment plan with consideration of other conditions, medications, and treatments.    Transcribed from ambient dictation for Miguel Angel Palma MD by Guy Harding.  11/09/22   16:59 EST    Patient or patient representative verbalized consent to the visit recording.  I have personally performed the services described in this document as transcribed by the above individual, and it is both accurate and complete.  Miguel Angel Palma MD  11/26/2022  03:52 EST

## 2022-11-17 ENCOUNTER — PATIENT ROUNDING (BHMG ONLY) (OUTPATIENT)
Dept: NEUROLOGY | Facility: CLINIC | Age: 42
End: 2022-11-17

## 2022-11-22 ENCOUNTER — TELEPHONE (OUTPATIENT)
Dept: NEUROLOGY | Facility: CLINIC | Age: 42
End: 2022-11-22

## 2022-11-22 NOTE — TELEPHONE ENCOUNTER
Ask her how much she is taking currently. I can potentially take over and adjust the prescription but I typically do dosing three times a day.

## 2022-11-22 NOTE — TELEPHONE ENCOUNTER
Caller: Jacqueline Coronel    Relationship: Self    Best call back number: [phone]    What is the best time to reach you: ANY    Who are you requesting to speak with (clinical staff, provider,  specific staff member): RICHARD     What was the call regarding: PATIENT IS RECEIVING GABAPENTIN ALREADY FROM ANOTHER PROVIDER. SHE WOULD LIKE TO SPEAK TO DR RAMOS ABOUT HIS RECOMMENDATION & TO SEE ABOUT GETTING THIS RX CHANGED    SHE STATES THE LEFT SIDE OF HER FACE IS NOT DOING WELL & INCREASING HER GABAPENTIN HELPS WITH THIS BY BRINGING IT DOWN TO A TOLERABLE LEVEL     PLEASE CALL & ADVISE

## 2022-11-23 DIAGNOSIS — R51.9 FACIAL PAIN: Primary | ICD-10-CM

## 2022-11-23 RX ORDER — GABAPENTIN 300 MG/1
CAPSULE ORAL
Qty: 120 CAPSULE | Refills: 3 | Status: SHIPPED | OUTPATIENT
Start: 2022-11-23

## 2022-12-07 ENCOUNTER — APPOINTMENT (OUTPATIENT)
Dept: CT IMAGING | Facility: HOSPITAL | Age: 42
End: 2022-12-07

## 2022-12-12 ENCOUNTER — APPOINTMENT (OUTPATIENT)
Dept: CT IMAGING | Facility: HOSPITAL | Age: 42
End: 2022-12-12

## 2022-12-13 ENCOUNTER — DOCUMENTATION (OUTPATIENT)
Dept: PHYSICAL THERAPY | Facility: CLINIC | Age: 42
End: 2022-12-13

## 2022-12-13 NOTE — PROGRESS NOTES
Discharge Summary  Discharge Summary from Physical Therapy Report      Dates  PT visit: 8/3/2022 to 11/8/2022  Number of Visits: 12     Discharge Status of Patient: discharged    Goals: unable to assess    Discharge Plan: Continue with current home exercise program as instructed    Comments: The patient is being discharged today due to inactivity over 30 days per department policy. Patient will be discharged today.    Date of Discharge 12/13/2022        Tricia Jimenez, PT  Physical Therapist

## 2022-12-19 ENCOUNTER — TELEPHONE (OUTPATIENT)
Dept: NEUROLOGY | Facility: CLINIC | Age: 42
End: 2022-12-19

## 2022-12-19 NOTE — TELEPHONE ENCOUNTER
"Provider: RICHARD  Caller: LACHELLE  Relationship to Patient: N/A  Phone Number: [phone]  Reason for Call: LACHELLE W/ [facility] FINANCIAL CLEARANCE STATES PATIENTS CTA HAS BEEN DENIED (AGAIN) SHE STATES PATIENT HAS AN APPT SCHEDULED FOR 12/21/22 @ 11AM.     THE APPT IS STILL SCHEDU;ED & PATIENT CAN SIGN A \"SELF PAY\" AGREEMENT, HOWEVER PEER TO PEER IS NOT AVAILABLE.    PLEASE CALL & ADVISE THE PATIENT & FINANCIAL CLEARANCE OF ANY APPEALS OR THE NEXT STEPS TO MOVE FORWARD WITH THIS PROCEDURE.    THANK YOU   "

## 2022-12-21 ENCOUNTER — APPOINTMENT (OUTPATIENT)
Dept: CT IMAGING | Facility: HOSPITAL | Age: 42
End: 2022-12-21

## 2023-02-06 ENCOUNTER — TELEPHONE (OUTPATIENT)
Dept: NEUROLOGY | Facility: CLINIC | Age: 43
End: 2023-02-06

## 2023-02-06 NOTE — TELEPHONE ENCOUNTER
Patient came into the office for a follow with Dr Palma and got nauseated and could not stand up anymore.    Patient stated that her Doctor had quite her Prestique cold turkey on Friday and she was getting worth and worth by the day . She could not eat and vomiting all the time inflammatory bowel disease checked her pulse what was 128 and her BP was 98/54 ,she was to weak to stand up and I had her lay down I spoke with the patient about that we should call EMS and she agreed also notified Dr Palma and clinic Supervisor Sulma Chávez . [facility]

## 2023-08-11 NOTE — PROGRESS NOTES
Physical Therapy Initial Evaluation and Plan of Care    Patient: Jacqueline Coronel   : 1980  Diagnosis/ICD-10 Code:  H/O cervical spine surgery [Z98.890]  Referring practitioner: Evita Dyer*  Date of Initial Visit: 8/3/2022  Today's Date: 8/3/2022  Patient seen for 1 sessions         Visit Diagnoses:    ICD-10-CM ICD-9-CM   1. H/O cervical spine surgery  Z98.890 V45.89   2. Cervicalgia  M54.2 723.1       Subjective Questionnaire: NDI: 31      Subjective Evaluation    History of Present Illness  Mechanism of injury: Patient presents to therapy following ACDF of C4/5 on 2022. Patient reports that she started having ear pain in May/2022, she started having severe deltoid pain and weakness in 2022. She saw the surgeon and he wanted to do surgery right away. Patient had ACDF of C4/5. Patient has a history of ACDF of C6/7 on 2020. She reports that she believes she is having C5/6 symptoms at the moment. Patient reports that pain, numbness, and tingling into the right index finger. She reports that her handwriting has significantly diminished since surgery.    Patient reports that following surgery, she has been having drooping on the L side of the face, with slurred speech and numbness on the left side of the face.     Patient reports that the pain meds are not helping right now and that she is unable to sleep. She reports that every feels tight and she has been having headaches.    Sleeping throughout the night, looking up and down, turning the head, writing with her arm, lifting objects, and reaching above the head tends to aggravate symptoms into the neck and arms. Medication, resting, and ice tends to reduce symptoms into the neck.    Pain is located between the shoulder blades which is burning and searing in nature. Also reports cervicogenic headaches in the back of the head.     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 She reports she would like to start on Monday for about 2-3 hours.      Patient Occupation: Surgery Coordinator Quality of life: good    Pain  Current pain ratin  Quality: burning, dull ache, throbbing and tight  Relieving factors: medications and ice  Aggravating factors: overhead activity, squatting, lifting, prolonged positioning, movement and sleeping    Hand dominance: right    Treatments  Previous treatment: physical therapy  Patient Goals  Patient goals for therapy: decreased pain, increased motion, increased strength, independence with ADLs/IADLs and return to sport/leisure activities             Objective          Palpation   Left   Hypertonic in the cervical paraspinals, levator scapulae, scalenes, suboccipitals and upper trapezius.   Tenderness of the cervical paraspinals, suboccipitals and upper trapezius.     Right   Hypertonic in the cervical paraspinals, levator scapulae, scalenes, suboccipitals and upper trapezius. Tenderness of the cervical paraspinals, suboccipitals and upper trapezius.     Additional Palpation Details  Gentle palpation, no aggressive palpation    Neurological Testing     Additional Neurological Details  Patient with facial droop on L, slurred speech    Active Range of Motion   Cervical/Thoracic Spine   Cervical    Flexion: 15 degrees   Extension: 25 degrees   Left lateral flexion: 10 degrees   Right lateral flexion: 25 degrees   Left rotation: 35 degrees   Right rotation: 35 degrees     Additional Active Range of Motion Details  Advised gentle ROM, pain free    Strength/Myotome Testing     Additional Strength Details  Not tested due to surgical restrictions for lifting     General Comments     Cervical/Thoracic Comments  Surgical incision: healed, no signs of infection; swelling under             Assessment & Plan     Assessment  Impairments: abnormal coordination, abnormal muscle firing, abnormal muscle tone, abnormal or restricted ROM, activity intolerance,  impaired physical strength and lacks appropriate home exercise program  Functional Limitations: carrying objects, lifting, sleeping, pulling, pushing, uncomfortable because of pain, sitting and reaching overhead  Assessment details: The patient is a 42 y.o. female who presents to physical therapy today s/p ACDF of C4-5. Upon initial evaluation, the patient demonstrates the following impairments: increased pain, increased muscle tone, abnormal posture, decreased strength, and decreased ROM. Due to these impairments, the patient is unable to look up or down, turn the neck, and sleep throughout the night without an increase in symptoms. The patient would benefit from skilled PT services to address functional limitations and impairments and to improve patient quality of life.    Prognosis: good    Goals  Plan Goals: STG's: 2 weeks   Patient will report a reduction in pain by >25%  Patient will be able to perform HEP with minimal verbal cues     LTG's: By discharge   Patient will report a reduction in pain by >75%  Patient will have an improvement on the Neck Disability Index to demonstrate overall improvement in daily functional level  Patient will be able to reach into overhead cabinet to get a dish without pain or difficulty  Patient will be independent with undergarment dressing without pain   Patient will be able to tolerate sitting > 45 minutes without increased pain  Patient will demonstrate sufficient cervical AROM to allow patient to turn her head to view blindspots when driving  Patient will be able to sleep > 5 hours without waking in pain   Patient will be independent with HEP        Plan  Therapy options: will be seen for skilled therapy services  Planned modality interventions: cryotherapy, electrical stimulation/Russian stimulation, TENS, thermotherapy (hydrocollator packs) and traction  Planned therapy interventions: abdominal trunk stabilization, body mechanics training, fine motor coordination training,  flexibility, functional ROM exercises, home exercise program, IADL retraining, joint mobilization, manual therapy, neuromuscular re-education, postural training, soft tissue mobilization, spinal/joint mobilization, strengthening, stretching and therapeutic activities  Frequency: 2x week  Duration in visits: 16  Duration in weeks: 8  Treatment plan discussed with: patient        History # of Personal Factors and/or Comorbidities: MODERATE (1-2)  Examination of Body System(s): # of elements: MODERATE (3)  Clinical Presentation: EVOLVING  Clinical Decision Making: MODERATE      Timed:         Manual Therapy:         mins  53823;     Therapeutic Exercise:         mins  08938;     Neuromuscular Micky:        mins  26734;    Therapeutic Activity:     8     mins  06315;     Gait Training:           mins  05287;     Ultrasound:          mins  42540;    Ionto                                   mins   93868  Self Care                            mins   44500  Canalith Repos         mins 98436      Un-Timed:  Electrical Stimulation:         mins  09580 ( );  Dry Needling          mins self-pay  Traction          mins 02419  Low Eval          Mins  81092  Mod Eval     30     Mins  04375  High Eval                            Mins  87901        Timed Treatment:   8   mins   Total Treatment:     38   mins      PT SIGNATURE: Tricia Jimenez PT   [place] License # [id number]  DATE TREATMENT INITIATED: 8/3/2022    Initial Certification  Certification Period: 8/3/2022 thru 10/31/2022  I certify that the therapy services are furnished while this patient is under my care.  The services outlined above are required by this patient, and will be reviewed every 90 days.  Physician Signature: ________________________________________________________________________   PHYSICIAN: Evita Quevedo PA-C      DATE: _________________    Please sign and return via fax to [phone].. Thank you, [facility].   119

## 2023-12-07 ENCOUNTER — TRANSCRIBE ORDERS (OUTPATIENT)
Dept: ADMINISTRATIVE | Facility: HOSPITAL | Age: 43
End: 2023-12-07
Payer: COMMERCIAL

## 2023-12-07 DIAGNOSIS — Z12.31 VISIT FOR SCREENING MAMMOGRAM: Primary | ICD-10-CM

## 2023-12-20 ENCOUNTER — HOSPITAL ENCOUNTER (OUTPATIENT)
Dept: MAMMOGRAPHY | Facility: HOSPITAL | Age: 43
Discharge: HOME | End: 2023-12-20
Admitting: INTERNAL MEDICINE
Payer: COMMERCIAL

## 2023-12-20 DIAGNOSIS — Z12.31 VISIT FOR SCREENING MAMMOGRAM: ICD-10-CM

## 2023-12-20 PROCEDURE — 77067 SCR MAMMO BI INCL CAD: CPT

## 2023-12-20 PROCEDURE — 77063 BREAST TOMOSYNTHESIS BI: CPT

## 2024-01-18 ENCOUNTER — TELEPHONE (OUTPATIENT)
Dept: ONCOLOGY | Facility: CLINIC | Age: 44
End: 2024-01-18
Payer: COMMERCIAL

## 2024-01-29 ENCOUNTER — PATIENT ROUNDING (BHMG ONLY) (OUTPATIENT)
Dept: ONCOLOGY | Facility: CLINIC | Age: 44
End: 2024-01-29
Payer: COMMERCIAL

## 2024-01-29 ENCOUNTER — TELEPHONE (OUTPATIENT)
Dept: ONCOLOGY | Facility: CLINIC | Age: 44
End: 2024-01-29
Payer: COMMERCIAL

## 2024-01-29 ENCOUNTER — LAB (OUTPATIENT)
Dept: LAB | Facility: HOSPITAL | Age: 44
End: 2024-01-29
Payer: COMMERCIAL

## 2024-01-29 ENCOUNTER — CONSULT (OUTPATIENT)
Dept: ONCOLOGY | Facility: CLINIC | Age: 44
End: 2024-01-29
Payer: COMMERCIAL

## 2024-01-29 VITALS
SYSTOLIC BLOOD PRESSURE: 114 MMHG | WEIGHT: 137.5 LBS | BODY MASS INDEX: 20.84 KG/M2 | OXYGEN SATURATION: 100 % | RESPIRATION RATE: 16 BRPM | DIASTOLIC BLOOD PRESSURE: 76 MMHG | HEIGHT: 68 IN | TEMPERATURE: 98.4 F | HEART RATE: 76 BPM

## 2024-01-29 DIAGNOSIS — E83.110 HEREDITARY HEMOCHROMATOSIS: Primary | ICD-10-CM

## 2024-01-29 DIAGNOSIS — R79.89 ABNORMAL CBC: Primary | ICD-10-CM

## 2024-01-29 LAB
ALBUMIN SERPL-MCNC: 4 G/DL (ref 3.5–5.2)
ALBUMIN/GLOB SERPL: 1.7 G/DL
ALP SERPL-CCNC: 55 U/L (ref 39–117)
ALT SERPL W P-5'-P-CCNC: <5 U/L (ref 1–33)
ANION GAP SERPL CALCULATED.3IONS-SCNC: 7 MMOL/L (ref 5–15)
AST SERPL-CCNC: 12 U/L (ref 1–32)
BASOPHILS # BLD AUTO: 0.03 10*3/MM3 (ref 0–0.2)
BASOPHILS NFR BLD AUTO: 0.4 % (ref 0–1.5)
BILIRUB SERPL-MCNC: 0.2 MG/DL (ref 0–1.2)
BUN SERPL-MCNC: 12 MG/DL (ref 6–20)
BUN/CREAT SERPL: 15.4 (ref 7–25)
CALCIUM SPEC-SCNC: 9.2 MG/DL (ref 8.6–10.5)
CHLORIDE SERPL-SCNC: 104 MMOL/L (ref 98–107)
CO2 SERPL-SCNC: 28 MMOL/L (ref 22–29)
CREAT SERPL-MCNC: 0.78 MG/DL (ref 0.57–1)
DEPRECATED RDW RBC AUTO: 40 FL (ref 37–54)
EGFRCR SERPLBLD CKD-EPI 2021: 96.8 ML/MIN/1.73
EOSINOPHIL # BLD AUTO: 0.19 10*3/MM3 (ref 0–0.4)
EOSINOPHIL NFR BLD AUTO: 2.8 % (ref 0.3–6.2)
ERYTHROCYTE [DISTWIDTH] IN BLOOD BY AUTOMATED COUNT: 12.3 % (ref 12.3–15.4)
FERRITIN SERPL-MCNC: 155 NG/ML (ref 13–150)
GLOBULIN UR ELPH-MCNC: 2.4 GM/DL
GLUCOSE SERPL-MCNC: 96 MG/DL (ref 65–99)
HCT VFR BLD AUTO: 39.5 % (ref 34–46.6)
HGB BLD-MCNC: 13.8 G/DL (ref 12–15.9)
IMM GRANULOCYTES # BLD AUTO: 0.08 10*3/MM3 (ref 0–0.05)
IMM GRANULOCYTES NFR BLD AUTO: 1.2 % (ref 0–0.5)
IRON 24H UR-MRATE: 87 MCG/DL (ref 37–145)
IRON SATN MFR SERPL: 44 % (ref 20–50)
LYMPHOCYTES # BLD AUTO: 1.27 10*3/MM3 (ref 0.7–3.1)
LYMPHOCYTES NFR BLD AUTO: 18.8 % (ref 19.6–45.3)
MCH RBC QN AUTO: 31.4 PG (ref 26.6–33)
MCHC RBC AUTO-ENTMCNC: 34.9 G/DL (ref 31.5–35.7)
MCV RBC AUTO: 90 FL (ref 79–97)
MONOCYTES # BLD AUTO: 0.49 10*3/MM3 (ref 0.1–0.9)
MONOCYTES NFR BLD AUTO: 7.3 % (ref 5–12)
NEUTROPHILS NFR BLD AUTO: 4.68 10*3/MM3 (ref 1.7–7)
NEUTROPHILS NFR BLD AUTO: 69.5 % (ref 42.7–76)
NRBC BLD AUTO-RTO: 0 /100 WBC (ref 0–0.2)
PLATELET # BLD AUTO: 193 10*3/MM3 (ref 140–450)
PMV BLD AUTO: 11.1 FL (ref 6–12)
POTASSIUM SERPL-SCNC: 4.5 MMOL/L (ref 3.5–5.2)
PROT SERPL-MCNC: 6.4 G/DL (ref 6–8.5)
RBC # BLD AUTO: 4.39 10*6/MM3 (ref 3.77–5.28)
SODIUM SERPL-SCNC: 139 MMOL/L (ref 136–145)
TIBC SERPL-MCNC: 197 MCG/DL (ref 298–536)
TRANSFERRIN SERPL-MCNC: 141 MG/DL (ref 200–360)
WBC NRBC COR # BLD AUTO: 6.74 10*3/MM3 (ref 3.4–10.8)

## 2024-01-29 PROCEDURE — 36415 COLL VENOUS BLD VENIPUNCTURE: CPT

## 2024-01-29 PROCEDURE — 82728 ASSAY OF FERRITIN: CPT | Performed by: INTERNAL MEDICINE

## 2024-01-29 PROCEDURE — 84466 ASSAY OF TRANSFERRIN: CPT | Performed by: INTERNAL MEDICINE

## 2024-01-29 PROCEDURE — 80053 COMPREHEN METABOLIC PANEL: CPT | Performed by: INTERNAL MEDICINE

## 2024-01-29 PROCEDURE — 85025 COMPLETE CBC W/AUTO DIFF WBC: CPT

## 2024-01-29 PROCEDURE — 83540 ASSAY OF IRON: CPT | Performed by: INTERNAL MEDICINE

## 2024-01-29 RX ORDER — BUPROPION HYDROCHLORIDE 150 MG/1
150 TABLET ORAL EVERY MORNING
COMMUNITY
Start: 2023-11-02

## 2024-01-29 NOTE — PROGRESS NOTES
A My-Chart message has been sent to the patient for PATIENT ROUNDING with [facility].

## 2024-01-29 NOTE — TELEPHONE ENCOUNTER
Called pt and reviewed recommendations of Dr. Trinh. Verified plan in place for phlebotomy with parameters specified by Dr. Trinh. Pt v/u of this and states she may have to do in next week due to her scheduling needs. I advised her that scheduling will be calling her to arrange. She v/u

## 2024-01-29 NOTE — PROGRESS NOTES
Subjective     REASON FOR CONSULTATION: Hereditary hemochromatosis (homozygous C282Y mutation).  Provide an opinion on any further workup or treatment                             REQUESTING PHYSICIAN:  Steff Keys MD    RECORDS OBTAINED:  Records of the patients history including those obtained from the referring provider were reviewed and summarized in detail.    HISTORY OF PRESENT ILLNESS:  The patient is a 44 y.o. year old female who is here for an opinion about the above issue.  She is homozygous for the C282Y hemochromatosis gene mutation.  She had previously been following with Dr. Garland David at [facility].  She apparently has not exhibited definite iron overload and has not been undergoing routine therapeutic phlebotomy.    Dr. David is now retired.  She had also briefly been seen in our office for about a year from June 2019 through July 2020.  She actually underwent phlebotomy of 300 cc on 2 occasions during that year on 1/10/2020 and 7/10/2020 with a goal of keeping her ferritin level around 50.    She is no longer having menstrual periods having undergone hysterectomy on 9/30/2021.  Her most recent ferritin level from 12/8/2023 was normal at 74.    Reports that since her last follow-up visit here she required another cervical disc surgery with Dr. Muñoz in July 2022.    History of Present Illness     Past Medical History:   Diagnosis Date    Allergic     Anxiety     Chronic cholecystitis     COVID-19 virus detected 09/2021    Depression     Endometriosis     Headache     Hereditary hemochromatosis     History of chicken pox     History of IBS     History of migraines     [place] (hard of hearing)     Due to ear history, surgery x4    Hypertension 2017    Highest bp around 150/90    IBS (irritable bowel syndrome)     Incisional hernia     Injury of neck     Latex allergy     Neuropathy     rt arm and  hand    Osteoarthritis 2015    PONV (postoperative nausea and vomiting)     Spinal headache      Tattoos         Past Surgical History:   Procedure Laterality Date    ABDOMINAL WALL SURGERY  2017    Endometrioma removed from abdominal wall/ scar    ANTERIOR CERVICAL DISCECTOMY W/ FUSION N/A 2020    Procedure: V-7-I-7CERVICAL DISCECTOMY ANTERIOR FUSION WITH INSTRUMENTATION WITH CAGE AND  PLATE;  Surgeon: Shar Muñoz MD;  Location: [facility] OR;  Service: Neurosurgery;  Laterality: N/A;    ANTERIOR CERVICAL DISCECTOMY W/ FUSION Left 2022    Procedure: Anterior cervical discectomy and fusion with cage and plate, cervical four/five;  Surgeon: Shar Muñoz MD;  Location: [facility] OR;  Service: Neurosurgery;  Laterality: Left;     SECTION  ,     CHOLECYSTECTOMY      CYSTOSCOPY N/A 2021    Procedure: CYSTOSCOPY;  Surgeon: Chioma Isabel MD;  Location: [facility] MAIN OR;  Service: Robotics - DaVinci;  Laterality: N/A;    DIAGNOSTIC LAPAROSCOPY N/A 2019    Procedure: DIAGNOSTIC LAPAROSCOPY;  Surgeon: Corine Donovan MD;  Location: [facility] OR;  Service: General    INNER EAR SURGERY Bilateral 2002    Tympanoplasty    TOTAL LAPAROSCOPIC HYSTERECTOMY SALPINGECTOMY N/A 2021    Procedure: TOTAL LAPAROSCOPIC HYSTERECTOMY,  WITH DAVINCI ROBOT;  Surgeon: Chioma Isabel MD;  Location: [facility] OR;  Service: Robotics - DaVinci;  Laterality: N/A;    TUBAL ABDOMINAL LIGATION      TYMPANOPLASTY Bilateral     several        Current Outpatient Medications on File Prior to Visit   Medication Sig Dispense Refill    acetaminophen (TYLENOL) 325 MG tablet Take 2 tablets by mouth Every 4 (Four) Hours As Needed for Mild Pain .      baclofen (LIORESAL) 10 MG tablet TAKE 1 TABLET BY MOUTH THREE TIMES A DAY (Patient taking differently: Take 1 tablet by mouth 3 (Three) Times a Day As Needed for Muscle Spasms.) 60 tablet 3    buPROPion XL (WELLBUTRIN XL) 150 MG 24 hr tablet Take 1 tablet by mouth Every Morning.       "butalbital-acetaminophen-caffeine (ORBIVAN) -40 MG capsule capsule Take 1 capsule by mouth Every 4 (Four) Hours As Needed.      FLUoxetine (PROzac) 40 MG capsule Take 1 capsule by mouth Every Morning.      gabapentin (NEURONTIN) 300 MG capsule Take 300mg in AM, 300mg in afternoon, and 300mg at night. May take an additional SINGLE capsule (300mg) with morning, noon, OR night. Maximum of 1200mg (4 capsules) in 24 hours. 120 capsule 3    nebivolol (BYSTOLIC) 5 MG tablet Take 1 tablet by mouth Daily.      traZODone (DESYREL) 50 MG tablet Take 1-2 tablets by mouth At Night As Needed.      [DISCONTINUED] buPROPion SR (WELLBUTRIN SR) 150 MG 12 hr tablet Take 150 mg by mouth 2 (Two) Times a Day.      [DISCONTINUED] Rimegepant Sulfate (Nurtec) 75 MG tablet dispersible tablet Take 1 tablet by mouth Every Other Day As Needed (migraine). Do not take in same day as Ubrelvy dose. 2 tablet 0    [DISCONTINUED] ubrogepant 50 MG tablet Take 1 tablet by mouth Daily As Needed (migraine) for up to 1 dose. Do not take in same day as other Ubrelvy dose. Do not take in same day as Nurtec 1 tablet 0     No current facility-administered medications on file prior to visit.        ALLERGIES:    Allergies   Allergen Reactions    Decadron [Dexamethasone] Rash     Patient reports redness all over and difficulty sleeping, \"steroid induced psychosis\"    Latex Rash     sensitive    Mefenamic Acid Swelling     Facial swelling    Sulfa Antibiotics Rash     Had at a young age vomiting and rash  rash & vomting          Social History     Socioeconomic History    Marital status:      Spouse name: Storm    Number of children: 2    Years of education: College   Tobacco Use    Smoking status: Former     Packs/day: 0.25     Years: 10.00     Additional pack years: 0.00     Total pack years: 2.50     Types: Electronic Cigarette, Cigarettes     Quit date:      Years since quittin.0    Smokeless tobacco: Never    Tobacco comments:     No " "nicotine in e-cig currently   Vaping Use    Vaping Use: Every day    Start date: 1/1/2018    Substances: CBD, Flavoring    Devices: Refillable tank   Substance and Sexual Activity    Alcohol use: Not Currently    Drug use: Not Currently     Types: Marijuana     Comment: over 20 years ago    Sexual activity: Defer     Comment: Tubal ligation 2016 with uterine ablation        Family History   Problem Relation Age of Onset    Hypertension Mother     Hypertension Father     Cancer Maternal Grandfather         Thyroid    Malig Hyperthermia Neg Hx         Review of Systems   Constitutional:  Positive for fatigue. Negative for activity change, chills and fever.   HENT:  Negative for mouth sores, trouble swallowing and voice change.    Eyes:  Negative for pain and visual disturbance.   Respiratory:  Negative for cough, shortness of breath and wheezing.    Cardiovascular:  Negative for chest pain and palpitations.   Gastrointestinal:  Negative for abdominal pain, constipation, diarrhea, nausea and vomiting.   Genitourinary:  Negative for difficulty urinating, frequency and urgency.   Musculoskeletal:  Negative for arthralgias and joint swelling.   Skin:  Negative for rash.   Neurological:  Negative for dizziness, seizures, weakness and headaches.   Hematological:  Negative for adenopathy. Does not bruise/bleed easily.   Psychiatric/Behavioral:  Negative for behavioral problems and confusion. The patient is not nervous/anxious.         Objective     Vitals:    01/29/24 1047   BP: 114/76   Pulse: 76   Resp: 16   Temp: 98.4 °F (36.9 °C)   TempSrc: Temporal   SpO2: 100%   Weight: 62.4 kg (137 lb 8 oz)   Height: 173 cm (68.11\")  Comment: new ht   PainSc: 0-No pain         7/10/2020     2:01 PM   Current Status   ECOG score 0       Physical Exam  Constitutional:       General: She is not in acute distress.     Appearance: She is well-developed.   HENT:      Head: Normocephalic.   Eyes:      General: No scleral icterus.     " Conjunctiva/sclera: Conjunctivae normal.      Pupils: Pupils are equal, round, and reactive to light.   Neck:      Thyroid: No thyromegaly.      Vascular: No JVD.   Cardiovascular:      Rate and Rhythm: Normal rate and regular rhythm.      Heart sounds: No murmur heard.     No friction rub. No gallop.   Pulmonary:      Effort: Pulmonary effort is normal.      Breath sounds: Normal breath sounds. No wheezing or rales.   Abdominal:      General: There is no distension.      Palpations: Abdomen is soft. There is no mass.      Tenderness: There is no abdominal tenderness.   Musculoskeletal:         General: No deformity. Normal range of motion.      Cervical back: Normal range of motion and neck supple.   Lymphadenopathy:      Cervical: No cervical adenopathy.   Skin:     General: Skin is warm and dry.      Findings: No erythema or rash.   Neurological:      Mental Status: She is alert and oriented to person, place, and time.      Cranial Nerves: No cranial nerve deficit.      Deep Tendon Reflexes: Reflexes are normal and symmetric.   Psychiatric:         Behavior: Behavior normal.         Judgment: Judgment normal.           RECENT LABS:  Hematology WBC   Date Value Ref Range Status   01/29/2024 6.74 3.40 - 10.80 10*3/mm3 Final   02/06/2023 5.92 4.5 - 11.0 10*3/uL Final     RBC   Date Value Ref Range Status   01/29/2024 4.39 3.77 - 5.28 10*6/mm3 Final   02/06/2023 4.65 4.0 - 5.2 10*6/uL Final     Hemoglobin   Date Value Ref Range Status   01/29/2024 13.8 12.0 - 15.9 g/dL Final   02/06/2023 14.1 12.0 - 16.0 g/dL Final     Hematocrit   Date Value Ref Range Status   01/29/2024 39.5 34.0 - 46.6 % Final   02/06/2023 42.3 36.0 - 46.0 % Final     Platelets   Date Value Ref Range Status   01/29/2024 193 140 - 450 10*3/mm3 Final   02/06/2023 240 140 - 440 10*3/uL Final        Lab Results   Component Value Date    GLUCOSE 93 07/19/2022    BUN 13 02/06/2023    CREATININE 0.76 02/06/2023    EGFR 86.0 07/19/2022    BCR 17.1  02/06/2023    K 4.0 02/06/2023    CO2 26 02/06/2023    CALCIUM 9.4 02/06/2023    ALBUMIN 4.5 02/06/2023    BILITOT 0.3 02/06/2023    AST 13 02/06/2023    ALT 8 02/06/2023     Lab Results   Component Value Date    IRON 158 (H) 01/08/2021    TIBC 253 (L) 01/08/2021    FERRITIN 46.80 01/08/2021       Assessment & Plan     1.  Hereditary hemochromatosis.  Patient is homozygous for C282Y mutation but has not required routine phlebotomies.  She has now undergone total abdominal hysterectomy and is referred back to us for evaluation of any need for therapeutic phlebotomy.  2.  History of cervical disc herniations requiring 2 separate cervical spine surgeries with Dr. Muñoz most recently in July 2022.    Recommendations  1.  Her blood count in the office today is unremarkable probably will draw a repeat iron panel and ferritin as well as serum chemistry panel from our office today.  2.  We again discussed possible role for therapeutic phlebotomy to maintain her ferritin around 50 or so.  3.  We will review the results of the iron studies when they become available and contact the patient if she needs to return for therapeutic phlebotomy.    4.  Otherwise we will schedule her for repeat MD follow-up and possible phlebotomy in 3 months with labs drawn 1 week prior to that visit.    Thanks for allowing us to see Jacqueline again in consultation.

## 2024-01-29 NOTE — TELEPHONE ENCOUNTER
----- Message from Ramon Trinh MD sent at 1/29/2024 12:47 PM EST -----  Please let the patient know that we would like to get her back into the office later this week for therapeutic phlebotomy since her ferritin level was 155.  We would aim for a ferritin goal of 50 or less.  Her phlebotomy volume will be 300 mL.    Thanks  ----- Message -----  From: Lab, Background User  Sent: 1/29/2024  11:41 AM EST  To: Ramon Trinh MD

## 2024-01-31 ENCOUNTER — TELEPHONE (OUTPATIENT)
Dept: ONCOLOGY | Facility: CLINIC | Age: 44
End: 2024-01-31
Payer: COMMERCIAL

## 2024-01-31 NOTE — TELEPHONE ENCOUNTER
Caller: Jacqueline Coronel    Relationship to patient: Self    Best call back number: [phone]    Type of visit: PHLEBO    Requested date: MONDAY OR TUESDAY [facility] - FEB 13 EARLY AFTERNOON     If rescheduling, when is the original appointment: 2/1/24

## 2024-02-19 ENCOUNTER — INFUSION (OUTPATIENT)
Dept: ONCOLOGY | Facility: HOSPITAL | Age: 44
End: 2024-02-19
Payer: COMMERCIAL

## 2024-02-19 VITALS
DIASTOLIC BLOOD PRESSURE: 78 MMHG | OXYGEN SATURATION: 97 % | TEMPERATURE: 98.6 F | BODY MASS INDEX: 20.95 KG/M2 | SYSTOLIC BLOOD PRESSURE: 133 MMHG | RESPIRATION RATE: 16 BRPM | HEART RATE: 69 BPM | WEIGHT: 138.2 LBS

## 2024-02-19 DIAGNOSIS — E83.110 HEREDITARY HEMOCHROMATOSIS: Primary | ICD-10-CM

## 2024-02-19 PROCEDURE — 99195 PHLEBOTOMY: CPT

## 2024-02-19 PROCEDURE — 25810000003 SODIUM CHLORIDE 0.9 % SOLUTION: Performed by: INTERNAL MEDICINE

## 2024-02-19 RX ORDER — SODIUM CHLORIDE 9 MG/ML
250 INJECTION, SOLUTION INTRAVENOUS ONCE
OUTPATIENT
Start: 2024-02-26

## 2024-02-19 RX ORDER — SODIUM CHLORIDE 9 MG/ML
250 INJECTION, SOLUTION INTRAVENOUS ONCE
Status: COMPLETED | OUTPATIENT
Start: 2024-02-19 | End: 2024-02-19

## 2024-02-19 RX ADMIN — SODIUM CHLORIDE 250 ML: 0.9 INJECTION, SOLUTION INTRAVENOUS at 12:29

## 2024-04-22 ENCOUNTER — LAB (OUTPATIENT)
Dept: LAB | Facility: HOSPITAL | Age: 44
End: 2024-04-22
Payer: COMMERCIAL

## 2024-04-22 DIAGNOSIS — E83.110 HEREDITARY HEMOCHROMATOSIS: ICD-10-CM

## 2024-04-22 LAB
BASOPHILS # BLD AUTO: 0.02 10*3/MM3 (ref 0–0.2)
BASOPHILS NFR BLD AUTO: 0.4 % (ref 0–1.5)
DEPRECATED RDW RBC AUTO: 43.2 FL (ref 37–54)
EOSINOPHIL # BLD AUTO: 0.1 10*3/MM3 (ref 0–0.4)
EOSINOPHIL NFR BLD AUTO: 1.8 % (ref 0.3–6.2)
ERYTHROCYTE [DISTWIDTH] IN BLOOD BY AUTOMATED COUNT: 12.4 % (ref 12.3–15.4)
FERRITIN SERPL-MCNC: 76.2 NG/ML (ref 13–150)
HCT VFR BLD AUTO: 39.6 % (ref 34–46.6)
HGB BLD-MCNC: 13.1 G/DL (ref 12–15.9)
IMM GRANULOCYTES # BLD AUTO: 0.02 10*3/MM3 (ref 0–0.05)
IMM GRANULOCYTES NFR BLD AUTO: 0.4 % (ref 0–0.5)
IRON 24H UR-MRATE: 129 MCG/DL (ref 37–145)
IRON SATN MFR SERPL: 53 % (ref 20–50)
LYMPHOCYTES # BLD AUTO: 1.18 10*3/MM3 (ref 0.7–3.1)
LYMPHOCYTES NFR BLD AUTO: 20.7 % (ref 19.6–45.3)
MCH RBC QN AUTO: 31.1 PG (ref 26.6–33)
MCHC RBC AUTO-ENTMCNC: 33.1 G/DL (ref 31.5–35.7)
MCV RBC AUTO: 94.1 FL (ref 79–97)
MONOCYTES # BLD AUTO: 0.42 10*3/MM3 (ref 0.1–0.9)
MONOCYTES NFR BLD AUTO: 7.4 % (ref 5–12)
NEUTROPHILS NFR BLD AUTO: 3.95 10*3/MM3 (ref 1.7–7)
NEUTROPHILS NFR BLD AUTO: 69.3 % (ref 42.7–76)
NRBC BLD AUTO-RTO: 0 /100 WBC (ref 0–0.2)
PLATELET # BLD AUTO: 210 10*3/MM3 (ref 140–450)
PMV BLD AUTO: 10.3 FL (ref 6–12)
RBC # BLD AUTO: 4.21 10*6/MM3 (ref 3.77–5.28)
TIBC SERPL-MCNC: 244 MCG/DL (ref 298–536)
TRANSFERRIN SERPL-MCNC: 164 MG/DL (ref 200–360)
WBC NRBC COR # BLD AUTO: 5.69 10*3/MM3 (ref 3.4–10.8)

## 2024-04-22 PROCEDURE — 82728 ASSAY OF FERRITIN: CPT

## 2024-04-22 PROCEDURE — 84466 ASSAY OF TRANSFERRIN: CPT

## 2024-04-22 PROCEDURE — 83540 ASSAY OF IRON: CPT

## 2024-04-22 PROCEDURE — 36415 COLL VENOUS BLD VENIPUNCTURE: CPT

## 2024-04-22 PROCEDURE — 85025 COMPLETE CBC W/AUTO DIFF WBC: CPT

## 2024-04-29 ENCOUNTER — OFFICE VISIT (OUTPATIENT)
Dept: ONCOLOGY | Facility: CLINIC | Age: 44
End: 2024-04-29
Payer: COMMERCIAL

## 2024-04-29 VITALS
TEMPERATURE: 97.5 F | HEART RATE: 76 BPM | SYSTOLIC BLOOD PRESSURE: 130 MMHG | RESPIRATION RATE: 16 BRPM | OXYGEN SATURATION: 99 % | WEIGHT: 142.2 LBS | BODY MASS INDEX: 21.55 KG/M2 | HEIGHT: 68 IN | DIASTOLIC BLOOD PRESSURE: 87 MMHG

## 2024-04-29 DIAGNOSIS — E83.110 HEREDITARY HEMOCHROMATOSIS: Primary | ICD-10-CM

## 2024-04-29 PROCEDURE — 99214 OFFICE O/P EST MOD 30 MIN: CPT | Performed by: INTERNAL MEDICINE

## 2024-04-29 RX ORDER — ALPRAZOLAM 1 MG/1
TABLET ORAL
COMMUNITY
Start: 2024-04-15

## 2024-04-29 NOTE — PROGRESS NOTES
Subjective     REASON FOR FOLLOW UP: Hereditary hemochromatosis (homozygous C282Y mutation).    HISTORY OF PRESENT ILLNESS:  The patient is a 44 y.o. year old female who is homozygous for the C282Y hemochromatosis gene mutation.  She had previously been following with Dr. Garland David at [facility].  She apparently has not exhibited definite iron overload and has not been undergoing routine therapeutic phlebotomy.    Dr. David is now retired.  She had also briefly been seen in our office for about a year from June 2019 through July 2020.  She actually underwent phlebotomy of 300 cc on 2 occasions during that year on 1/10/2020 and 7/10/2020 with a goal of keeping her ferritin level around 50.    She is no longer having menstrual periods having undergone hysterectomy on 9/30/2021.      INTERVAL HISTORY:  With her initial consult visit on 1/29/2024 she was found to have a elevated ferritin level of 155 and she returned to our office on 2/19/2024 to undergo therapeutic phlebotomy of 300 cc with 250 cc of saline replacement infused also.    She returns now for routine 3-month follow-up and had labs performed on 4/22/2024.  Her ferritin level from 4/22 was 76.2.        History of Present Illness     Past Medical History:   Diagnosis Date    Allergic     Anxiety     Chronic cholecystitis     COVID-19 virus detected 09/2021    Depression     Endometriosis     Headache     Hereditary hemochromatosis     History of chicken pox     History of IBS     History of migraines     [place] (hard of hearing)     Due to ear history, surgery x4    Hypertension 2017    Highest bp around 150/90    IBS (irritable bowel syndrome)     Incisional hernia     Infectious mononucleosis     Injury of neck     Latex allergy     Neuropathy     rt arm and  hand    Osteoarthritis 2015    PONV (postoperative nausea and vomiting)     Spinal headache     Tattoos         Past Surgical History:   Procedure Laterality Date    ABDOMINAL WALL SURGERY   2017    Endometrioma removed from abdominal wall/ scar    ANTERIOR CERVICAL DISCECTOMY W/ FUSION N/A 2020    Procedure: B-1-B-7CERVICAL DISCECTOMY ANTERIOR FUSION WITH INSTRUMENTATION WITH CAGE AND  PLATE;  Surgeon: Shar Muñoz MD;  Location: [facility] OR;  Service: Neurosurgery;  Laterality: N/A;    ANTERIOR CERVICAL DISCECTOMY W/ FUSION Left 2022    Procedure: Anterior cervical discectomy and fusion with cage and plate, cervical four/five;  Surgeon: Shar Muñoz MD;  Location: [facility] OR;  Service: Neurosurgery;  Laterality: Left;     SECTION  ,     CHOLECYSTECTOMY      CYSTOSCOPY N/A 2021    Procedure: CYSTOSCOPY;  Surgeon: Chioma Isabel MD;  Location: [facility] MAIN OR;  Service: Robotics - DaVinci;  Laterality: N/A;    DIAGNOSTIC LAPAROSCOPY N/A 2019    Procedure: DIAGNOSTIC LAPAROSCOPY;  Surgeon: Corine Donovan MD;  Location: [facility] OR;  Service: General    INNER EAR SURGERY Bilateral 2002    Tympanoplasty    TOTAL LAPAROSCOPIC HYSTERECTOMY SALPINGECTOMY N/A 2021    Procedure: TOTAL LAPAROSCOPIC HYSTERECTOMY,  WITH DAVINCI ROBOT;  Surgeon: Chioma Isabel MD;  Location: [facility] OR;  Service: Robotics - DaVinci;  Laterality: N/A;    TUBAL ABDOMINAL LIGATION      TYMPANOPLASTY Bilateral     several        Current Outpatient Medications on File Prior to Visit   Medication Sig Dispense Refill    acetaminophen (TYLENOL) 325 MG tablet Take 2 tablets by mouth Every 4 (Four) Hours As Needed for Mild Pain .      ALPRAZolam (XANAX) 1 MG tablet TAKE 1/2 TABLET BY MOUTH 4 TIMES A DAY AS NEEDED FOR PANIC      baclofen (LIORESAL) 10 MG tablet TAKE 1 TABLET BY MOUTH THREE TIMES A DAY (Patient taking differently: Take 1 tablet by mouth 3 (Three) Times a Day As Needed for Muscle Spasms.) 60 tablet 3    buPROPion XL (WELLBUTRIN XL) 150 MG 24 hr tablet Take 1 tablet by mouth Every Morning.       "butalbital-acetaminophen-caffeine (ORBIVAN) -40 MG capsule capsule Take 1 capsule by mouth Every 4 (Four) Hours As Needed.      FLUoxetine (PROzac) 40 MG capsule Take 1 capsule by mouth Every Morning.      gabapentin (NEURONTIN) 300 MG capsule Take 300mg in AM, 300mg in afternoon, and 300mg at night. May take an additional SINGLE capsule (300mg) with morning, noon, OR night. Maximum of 1200mg (4 capsules) in 24 hours. 120 capsule 3    nebivolol (BYSTOLIC) 5 MG tablet Take 1 tablet by mouth Daily.      traZODone (DESYREL) 50 MG tablet Take 1-2 tablets by mouth At Night As Needed.       No current facility-administered medications on file prior to visit.        ALLERGIES:    Allergies   Allergen Reactions    Decadron [Dexamethasone] Rash     Patient reports redness all over and difficulty sleeping, \"steroid induced psychosis\"    Latex Rash     sensitive    Mefenamic Acid Swelling     Facial swelling    Sulfa Antibiotics Rash     Had at a young age vomiting and rash  rash & vomting          Social History     Socioeconomic History    Marital status:      Spouse name: Storm    Number of children: 2    Years of education: College   Tobacco Use    Smoking status: Former     Current packs/day: 0.00     Average packs/day: 0.3 packs/day for 10.0 years (2.5 ttl pk-yrs)     Types: Electronic Cigarette, Cigarettes     Start date:      Quit date:      Years since quittin.3     Passive exposure: Past    Smokeless tobacco: Never    Tobacco comments:     No nicotine in e-cig currently   Vaping Use    Vaping status: Every Day    Start date: 2018    Substances: CBD, Flavoring    Devices: Refillable tank   Substance and Sexual Activity    Alcohol use: Not Currently    Drug use: Not Currently     Types: Marijuana     Comment: over 20 years ago    Sexual activity: Defer     Comment: Tubal ligation 2016 with uterine ablation        Family History   Problem Relation Age of Onset    Hypertension Mother     " "Hypertension Father     Cancer Maternal Grandfather         Thyroid    Heart disease Other     Malig Hyperthermia Neg Hx         Review of Systems   Constitutional:  Positive for fatigue. Negative for activity change, chills and fever.   HENT:  Negative for mouth sores, trouble swallowing and voice change.    Eyes:  Negative for pain and visual disturbance.   Respiratory:  Negative for cough, shortness of breath and wheezing.    Cardiovascular:  Negative for chest pain and palpitations.   Gastrointestinal:  Negative for abdominal pain, constipation, diarrhea, nausea and vomiting.   Genitourinary:  Negative for difficulty urinating, frequency and urgency.   Musculoskeletal:  Negative for arthralgias and joint swelling.   Skin:  Negative for rash.   Neurological:  Negative for dizziness, seizures, weakness and headaches.   Hematological:  Negative for adenopathy. Does not bruise/bleed easily.   Psychiatric/Behavioral:  Negative for behavioral problems and confusion. The patient is not nervous/anxious.         Objective     Vitals:    04/29/24 1000   Resp: 16   Temp: 97.5 °F (36.4 °C)   TempSrc: Temporal   Weight: 64.5 kg (142 lb 3.2 oz)   Height: 173 cm (68.11\")   PainSc: 0-No pain           4/29/2024     9:55 AM   Current Status   ECOG score 0       Physical Exam  Constitutional:       General: She is not in acute distress.     Appearance: She is well-developed.   HENT:      Head: Normocephalic.   Eyes:      General: No scleral icterus.     Conjunctiva/sclera: Conjunctivae normal.      Pupils: Pupils are equal, round, and reactive to light.   Neck:      Thyroid: No thyromegaly.      Vascular: No JVD.   Cardiovascular:      Rate and Rhythm: Normal rate and regular rhythm.      Heart sounds: No murmur heard.     No friction rub. No gallop.   Pulmonary:      Effort: Pulmonary effort is normal.      Breath sounds: Normal breath sounds. No wheezing or rales.   Abdominal:      General: There is no distension.      " Palpations: Abdomen is soft. There is no mass.      Tenderness: There is no abdominal tenderness.   Musculoskeletal:         General: No deformity. Normal range of motion.      Cervical back: Normal range of motion and neck supple.   Lymphadenopathy:      Cervical: No cervical adenopathy.   Skin:     General: Skin is warm and dry.      Findings: No erythema or rash.   Neurological:      Mental Status: She is alert and oriented to person, place, and time.      Cranial Nerves: No cranial nerve deficit.      Deep Tendon Reflexes: Reflexes are normal and symmetric.   Psychiatric:         Behavior: Behavior normal.         Judgment: Judgment normal.           RECENT LABS:  Hematology WBC   Date Value Ref Range Status   04/22/2024 5.69 3.40 - 10.80 10*3/mm3 Final   02/06/2023 5.92 4.5 - 11.0 10*3/uL Final     RBC   Date Value Ref Range Status   04/22/2024 4.21 3.77 - 5.28 10*6/mm3 Final   02/06/2023 4.65 4.0 - 5.2 10*6/uL Final     Hemoglobin   Date Value Ref Range Status   04/22/2024 13.1 12.0 - 15.9 g/dL Final   02/06/2023 14.1 12.0 - 16.0 g/dL Final     Hematocrit   Date Value Ref Range Status   04/22/2024 39.6 34.0 - 46.6 % Final   02/06/2023 42.3 36.0 - 46.0 % Final     Platelets   Date Value Ref Range Status   04/22/2024 210 140 - 450 10*3/mm3 Final   02/06/2023 240 140 - 440 10*3/uL Final        Lab Results   Component Value Date    GLUCOSE 96 01/29/2024    BUN 12 01/29/2024    CREATININE 0.78 01/29/2024    EGFR 96.8 01/29/2024    BCR 15.4 01/29/2024    K 4.5 01/29/2024    CO2 28.0 01/29/2024    CALCIUM 9.2 01/29/2024    ALBUMIN 4.0 01/29/2024    BILITOT 0.2 01/29/2024    AST 12 01/29/2024    ALT <5 01/29/2024     Lab Results   Component Value Date    IRON 129 04/22/2024    TIBC 244 (L) 04/22/2024    FERRITIN 76.20 04/22/2024       Assessment & Plan     1.  Hereditary hemochromatosis.  Patient is homozygous for C282Y mutation but has not required routine phlebotomies.  She is status post total abdominal  hysterectomy and was referred back to us for evaluation of any need for therapeutic phlebotomy.  Her initial ferritin in the office on 1/29/2024 was elevated at 155.  She underwent 300 cc phlebotomy in the office on 2/19/2024.  2.  History of cervical disc herniations requiring 2 separate cervical spine surgeries with Dr. Muñoz most recently in July 2022.    PLAN  1.  Although her ferritin level today is 76 we elected to hold off on phlebotomy.  2.  She is going to be undergoing endocrinology consultation and expects to have a lot of lab work performed and would like to wait until the next ferritin level before will be decide about phlebotomizing her.  3.  We will schedule MD follow-up in 3 months with time for possible phlebotomy on the day of the visit.  She will undergo her lab work 1 week prior to that visit including a CBC, ferritin level, and iron profile.

## 2024-05-29 ENCOUNTER — TRANSCRIBE ORDERS (OUTPATIENT)
Dept: ADMINISTRATIVE | Facility: HOSPITAL | Age: 44
End: 2024-05-29
Payer: COMMERCIAL

## 2024-05-29 DIAGNOSIS — E04.1 THYROID NODULE: Primary | ICD-10-CM

## 2024-05-30 ENCOUNTER — HOSPITAL ENCOUNTER (OUTPATIENT)
Dept: ULTRASOUND IMAGING | Facility: HOSPITAL | Age: 44
Discharge: HOME | End: 2024-05-30
Admitting: INTERNAL MEDICINE
Payer: COMMERCIAL

## 2024-05-30 DIAGNOSIS — E04.1 THYROID NODULE: ICD-10-CM

## 2024-05-30 PROCEDURE — 76536 US EXAM OF HEAD AND NECK: CPT

## 2024-05-31 ENCOUNTER — OFFICE VISIT (OUTPATIENT)
Dept: ENDOCRINOLOGY | Age: 44
End: 2024-05-31
Payer: COMMERCIAL

## 2024-05-31 VITALS
HEART RATE: 88 BPM | DIASTOLIC BLOOD PRESSURE: 70 MMHG | SYSTOLIC BLOOD PRESSURE: 104 MMHG | WEIGHT: 141 LBS | OXYGEN SATURATION: 100 % | HEIGHT: 68 IN | BODY MASS INDEX: 21.37 KG/M2

## 2024-05-31 DIAGNOSIS — E16.2 HYPOGLYCEMIA: ICD-10-CM

## 2024-05-31 DIAGNOSIS — E04.1 THYROID CYST: Primary | ICD-10-CM

## 2024-05-31 DIAGNOSIS — R00.2 PALPITATIONS: ICD-10-CM

## 2024-05-31 DIAGNOSIS — E83.110 HEREDITARY HEMOCHROMATOSIS: ICD-10-CM

## 2024-05-31 NOTE — ASSESSMENT & PLAN NOTE
Has a large cystic right thyroid nodule measuring 2.3 x 1.8 x 2.0 cm consistent with a colloid cyst  Due to previous cervical spine surgery  having some pressure in her neck  Will see an ENT

## 2024-05-31 NOTE — PROGRESS NOTES
"Chief Complaint/ Reason for consult    THYROID NODULE and  low blood sugars        History of Present Illness      Reports experiencing low blood sugars since last year  stated that she has been experiencing episodes of sweating and shaking when she is keeping a meal and attributing these to low blood sugars however never checked her blood sugar during these episodes  States that her symptoms improves with eating  Symptoms got worse 6 months ago    Never been diagnosed with diabetes      Also complains of palpitations and fatigue   Hereditary hemochromatosis.  Patient is homozygous for C282Y mutation     Reports history of hysterectomy      Has history of PCOS    Denies  chronic steroid use  Stated that her mother may have Hashimoto's thyroiditis    Noted a lump in her neck on Wednesday, had a thyroid ultrasound yesterday which showed a thyroid cyst  Due to previous cervical spinal fusion has some pressure like symptoms in her neck and will see ENT for possible cyst  drainage/aspiration      Thyroid  us 5/2024: The right lobe of thyroid measures 5.3 x 1.7 x 2.0 cm. There is a large cystic right thyroid nodule measuring 2.3 x 1.8 x 2.0 cm with echogenic foci with ringdown artifact, most consistent with a colloid cyst.     The left lobe measures 4.2 x 1.5 x 1.3 cm. No nodules are identified.     IMPRESSION:  Impression:     1. 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        Objective   Vital Signs:  /70 (BP Location: Left arm, Patient Position: Sitting, Cuff Size: Adult)   Pulse 88   Ht 173 cm (68.11\")   Wt 64 kg (141 lb)   SpO2 100%   BMI 21.37 kg/m²   Estimated body mass index is 21.37 kg/m² as calculated from the following:    Height as of this encounter: 173 cm (68.11\").    Weight as of this encounter: 64 kg (141 lb).       BMI is within normal parameters. No other follow-up for BMI required.      Review of Systems   Constitutional:  Positive for fatigue.   Cardiovascular:  Positive for " palpitations.   Gastrointestinal:         Bloating    Neurological:  Negative for dizziness, light-headedness and headaches.        Physical Exam  Constitutional:       Appearance: Normal appearance.   HENT:      Head: Normocephalic and atraumatic.   Eyes:      Extraocular Movements: Extraocular movements intact.   Neck:      Comments: Right-sided thyroid nodule  Well-healed cervical scar  Cardiovascular:      Rate and Rhythm: Normal rate and regular rhythm.   Pulmonary:      Effort: Pulmonary effort is normal.      Breath sounds: Normal breath sounds. No wheezing.   Abdominal:      Palpations: Abdomen is soft.      Tenderness: There is no abdominal tenderness.   Musculoskeletal:         General: No swelling. Normal range of motion.      Cervical back: No tenderness.   Neurological:      Mental Status: She is alert and oriented to person, place, and time.      Comments: Tremor of outstretched hands noted   Psychiatric:         Mood and Affect: Mood normal.        Result Review :  The following data was reviewed by: Mahrokh Nokhbehzaeim, MD on 05/31/2024:  CMP          1/29/2024    11:08   CMP   Glucose 96    BUN 12    Creatinine 0.78    EGFR 96.8    Sodium 139    Potassium 4.5    Chloride 104    Calcium 9.2    Total Protein 6.4    Albumin 4.0    Globulin 2.4    Total Bilirubin 0.2    Alkaline Phosphatase 55    AST (SGOT) 12    ALT (SGPT) <5    Albumin/Globulin Ratio 1.7    BUN/Creatinine Ratio 15.4    Anion Gap 7.0        CMP          1/29/2024    11:08   CMP   Glucose 96    BUN 12    Creatinine 0.78    EGFR 96.8    Sodium 139    Potassium 4.5    Chloride 104    Calcium 9.2    Total Protein 6.4    Albumin 4.0    Globulin 2.4    Total Bilirubin 0.2    Alkaline Phosphatase 55    AST (SGOT) 12    ALT (SGPT) <5    Albumin/Globulin Ratio 1.7    BUN/Creatinine Ratio 15.4    Anion Gap 7.0       CBC          1/29/2024    10:37 4/22/2024    11:39   CBC   WBC 6.74  5.69    RBC 4.39  4.21    Hemoglobin 13.8  13.1    Hematocrit  "39.5  39.6    MCV 90.0  94.1    MCH 31.4  31.1    MCHC 34.9  33.1    RDW 12.3  12.4    Platelets 193  210          No results found for: \"[id number]\"   Data reviewed : Radiologic studies .Thyroid  Ultrasound             Assessment and Plan   Diagnoses and all orders for this visit:    1. Thyroid cyst (Primary)  Assessment & Plan:  Has a large cystic right thyroid nodule measuring 2.3 x 1.8 x 2.0 cm consistent with a colloid cyst  Due to previous cervical spine surgery  having some pressure in her neck  Will see an ENT      2. Hypoglycemia  Assessment & Plan:  Has been experiencing episodes up shaking and excessive sweating when his skipping a meal  Never been diagnosed with diabetes  Never checked her blood sugars  Attributing her symptoms to low blood sugars  Dexcom G7 sample placed in the office  Will review her blood sugar in 10 days    Orders:  -     TSH  -     T4, Free  -     Thyrotropin Receptor Antibody  -     Thyroid Peroxidase Antibody  -     Thyroglobulin Antibody  -     Thyroid Stimulating Immunoglobulin  -     Comprehensive Metabolic Panel  -     Cortisol - AM  -     ACTH  -     Celiac Comprehensive Panel    3. Palpitations  Assessment & Plan:  Check TFT  Instructed to drink enough water and stay hydrated    Orders:  -     TSH  -     T4, Free  -     Thyrotropin Receptor Antibody  -     Thyroid Peroxidase Antibody  -     Thyroglobulin Antibody  -     Thyroid Stimulating Immunoglobulin    4. Hereditary hemochromatosis  Assessment & Plan:  Given history of hemochromatosis we will check for adrenal insufficiency or thyroid disease    Orders:  -     TSH  -     T4, Free  -     Cortisol - AM  -     ACTH         Instructed to monitor her blood pressure and blood sugars    Follow Up   Return in about 6 months (around 11/30/2024).  Patient was given instructions and counseling regarding her condition or for health maintenance advice. Please see specific information pulled into the AVS if appropriate.       "

## 2024-05-31 NOTE — ASSESSMENT & PLAN NOTE
Has been experiencing episodes up shaking and excessive sweating when his skipping a meal  Never been diagnosed with diabetes  Never checked her blood sugars  Attributing her symptoms to low blood sugars  Dexcom G7 sample placed in the office  Will review her blood sugar in 10 days

## 2024-06-04 ENCOUNTER — TELEPHONE (OUTPATIENT)
Dept: ENDOCRINOLOGY | Age: 44
End: 2024-06-04
Payer: COMMERCIAL

## 2024-06-04 NOTE — TELEPHONE ENCOUNTER
Caller: Jacqueline Coronel    Relationship to patient: Self    Best call back number: [phone]     Patient is needing: PLEASE PLACE LABS FOR UPCOMING APPT WITH [facility]. PLEASE CONFIRM VIA MYCHART WHEN ORDERS PLACED.

## 2024-06-05 NOTE — TELEPHONE ENCOUNTER
Pt informed that lab orders are in. No need to fax since it is a [facility] they will be able to see the orders.

## 2024-06-07 ENCOUNTER — LAB (OUTPATIENT)
Dept: LAB | Facility: HOSPITAL | Age: 44
End: 2024-06-07
Payer: COMMERCIAL

## 2024-06-07 LAB
ALBUMIN SERPL-MCNC: 4.2 G/DL (ref 3.5–5.2)
ALBUMIN/GLOB SERPL: 1.7 G/DL
ALP SERPL-CCNC: 50 U/L (ref 39–117)
ALT SERPL W P-5'-P-CCNC: 12 U/L (ref 1–33)
ANION GAP SERPL CALCULATED.3IONS-SCNC: 8.1 MMOL/L (ref 5–15)
AST SERPL-CCNC: 13 U/L (ref 1–32)
BILIRUB SERPL-MCNC: 0.3 MG/DL (ref 0–1.2)
BUN SERPL-MCNC: 16 MG/DL (ref 6–20)
BUN/CREAT SERPL: 20.5 (ref 7–25)
CALCIUM SPEC-SCNC: 9.1 MG/DL (ref 8.6–10.5)
CHLORIDE SERPL-SCNC: 104 MMOL/L (ref 98–107)
CO2 SERPL-SCNC: 28.9 MMOL/L (ref 22–29)
CORTIS SERPL-MCNC: 22.2 MCG/DL
CREAT SERPL-MCNC: 0.78 MG/DL (ref 0.57–1)
EGFRCR SERPLBLD CKD-EPI 2021: 96.8 ML/MIN/1.73
GLOBULIN UR ELPH-MCNC: 2.5 GM/DL
GLUCOSE SERPL-MCNC: 84 MG/DL (ref 65–99)
POTASSIUM SERPL-SCNC: 4.5 MMOL/L (ref 3.5–5.2)
PROT SERPL-MCNC: 6.7 G/DL (ref 6–8.5)
SODIUM SERPL-SCNC: 141 MMOL/L (ref 136–145)
T4 FREE SERPL-MCNC: 1.05 NG/DL (ref 0.92–1.68)
TSH SERPL DL<=0.05 MIU/L-ACNC: 1.06 UIU/ML (ref 0.27–4.2)

## 2024-06-07 PROCEDURE — 82024 ASSAY OF ACTH: CPT | Performed by: STUDENT IN AN ORGANIZED HEALTH CARE EDUCATION/TRAINING PROGRAM

## 2024-06-07 PROCEDURE — 86800 THYROGLOBULIN ANTIBODY: CPT | Performed by: STUDENT IN AN ORGANIZED HEALTH CARE EDUCATION/TRAINING PROGRAM

## 2024-06-07 PROCEDURE — 84439 ASSAY OF FREE THYROXINE: CPT | Performed by: STUDENT IN AN ORGANIZED HEALTH CARE EDUCATION/TRAINING PROGRAM

## 2024-06-07 PROCEDURE — 86258 DGP ANTIBODY EACH IG CLASS: CPT | Performed by: STUDENT IN AN ORGANIZED HEALTH CARE EDUCATION/TRAINING PROGRAM

## 2024-06-07 PROCEDURE — 82784 ASSAY IGA/IGD/IGG/IGM EACH: CPT | Performed by: STUDENT IN AN ORGANIZED HEALTH CARE EDUCATION/TRAINING PROGRAM

## 2024-06-07 PROCEDURE — 80053 COMPREHEN METABOLIC PANEL: CPT | Performed by: STUDENT IN AN ORGANIZED HEALTH CARE EDUCATION/TRAINING PROGRAM

## 2024-06-07 PROCEDURE — 82533 TOTAL CORTISOL: CPT | Performed by: STUDENT IN AN ORGANIZED HEALTH CARE EDUCATION/TRAINING PROGRAM

## 2024-06-07 PROCEDURE — 86231 EMA EACH IG CLASS: CPT | Performed by: STUDENT IN AN ORGANIZED HEALTH CARE EDUCATION/TRAINING PROGRAM

## 2024-06-07 PROCEDURE — 83520 IMMUNOASSAY QUANT NOS NONAB: CPT | Performed by: STUDENT IN AN ORGANIZED HEALTH CARE EDUCATION/TRAINING PROGRAM

## 2024-06-07 PROCEDURE — 86376 MICROSOMAL ANTIBODY EACH: CPT | Performed by: STUDENT IN AN ORGANIZED HEALTH CARE EDUCATION/TRAINING PROGRAM

## 2024-06-07 PROCEDURE — 84443 ASSAY THYROID STIM HORMONE: CPT | Performed by: STUDENT IN AN ORGANIZED HEALTH CARE EDUCATION/TRAINING PROGRAM

## 2024-06-07 PROCEDURE — 84445 ASSAY OF TSI GLOBULIN: CPT | Performed by: STUDENT IN AN ORGANIZED HEALTH CARE EDUCATION/TRAINING PROGRAM

## 2024-06-07 PROCEDURE — 86364 TISS TRNSGLTMNASE EA IG CLAS: CPT | Performed by: STUDENT IN AN ORGANIZED HEALTH CARE EDUCATION/TRAINING PROGRAM

## 2024-06-08 LAB — THYROPEROXIDASE AB SERPL-ACNC: 21 IU/ML (ref 0–34)

## 2024-06-09 LAB
ACTH PLAS-MCNC: 28.4 PG/ML (ref 7.2–63.3)
TSI SER-ACNC: <0.1 IU/L (ref 0–0.55)

## 2024-06-10 LAB
ENDOMYSIUM IGA SER QL: NEGATIVE
GLIADIN PEPTIDE IGA SER-ACNC: 6 UNITS (ref 0–19)
GLIADIN PEPTIDE IGG SER-ACNC: 1 UNITS (ref 0–19)
IGA SERPL-MCNC: 238 MG/DL (ref 87–352)
THYROGLOB AB SERPL-ACNC: <1 IU/ML (ref 0–0.9)
TSH RECEP AB SER-ACNC: <1.1 IU/L (ref 0–1.75)
TTG IGA SER-ACNC: <2 U/ML (ref 0–3)
TTG IGG SER-ACNC: <2 U/ML (ref 0–5)

## 2024-06-11 ENCOUNTER — TELEPHONE (OUTPATIENT)
Dept: ENDOCRINOLOGY | Age: 44
End: 2024-06-11
Payer: COMMERCIAL

## 2024-06-11 DIAGNOSIS — E16.2 HYPOGLYCEMIA: Primary | ICD-10-CM

## 2024-06-11 RX ORDER — ACYCLOVIR 400 MG/1
1 TABLET ORAL
Qty: 3 EACH | Refills: 11 | Status: SHIPPED | OUTPATIENT
Start: 2024-06-11

## 2024-06-11 NOTE — TELEPHONE ENCOUNTER
Called and  discussed the CGM report  Average glucose 102  Time in range 98%  Low 1%  Very low less than 1%  Standard deviation 17    Blood sugar tracing is within target range all day blood sugar tracing is  Sinus pattern she gets low right after spikes  Reports episodes of low blood sugar in 50s  Had 1 low blood sugar right after  She ate out at a restaurant and had significant hypoglycemia symptoms    Will do the dietary modification first  She would like to see a dietitian  Will refer to dietitian  Will send in a prescription for Dexcom to monitor the blood sugars  She also was instructed to monitor the blood pressure and heart rate  If blood sugar does not improve with dietary modification next step will be 72 hours fasting

## 2024-06-17 ENCOUNTER — PRIOR AUTHORIZATION (OUTPATIENT)
Dept: ENDOCRINOLOGY | Age: 44
End: 2024-06-17
Payer: COMMERCIAL

## 2024-06-27 ENCOUNTER — OFFICE VISIT (OUTPATIENT)
Dept: NEUROLOGY | Facility: CLINIC | Age: 44
End: 2024-06-27
Payer: COMMERCIAL

## 2024-06-27 VITALS
BODY MASS INDEX: 20.92 KG/M2 | HEART RATE: 73 BPM | OXYGEN SATURATION: 98 % | WEIGHT: 138 LBS | DIASTOLIC BLOOD PRESSURE: 74 MMHG | SYSTOLIC BLOOD PRESSURE: 122 MMHG

## 2024-06-27 DIAGNOSIS — R51.9 FACIAL PAIN: ICD-10-CM

## 2024-06-27 PROCEDURE — 99214 OFFICE O/P EST MOD 30 MIN: CPT | Performed by: STUDENT IN AN ORGANIZED HEALTH CARE EDUCATION/TRAINING PROGRAM

## 2024-06-27 RX ORDER — PROMETHAZINE HYDROCHLORIDE 12.5 MG/1
12.5 TABLET ORAL EVERY 6 HOURS PRN
COMMUNITY

## 2024-06-27 RX ORDER — RIBOFLAVIN (VITAMIN B2) 400 MG
400 TABLET ORAL DAILY
Qty: 100 TABLET | Refills: 2 | Status: SHIPPED | OUTPATIENT
Start: 2024-06-27

## 2024-06-27 RX ORDER — GABAPENTIN 300 MG/1
CAPSULE ORAL
Qty: 120 CAPSULE | Refills: 3 | Status: SHIPPED | OUTPATIENT
Start: 2024-06-27

## 2024-06-27 RX ORDER — TOPIRAMATE 50 MG/1
TABLET, FILM COATED ORAL
Qty: 166 TABLET | Refills: 0 | Status: SHIPPED | OUTPATIENT
Start: 2024-06-27 | End: 2024-09-25

## 2024-06-27 RX ORDER — MAGNESIUM OXIDE 400 MG/1
400 TABLET ORAL DAILY
Qty: 100 TABLET | Refills: 2 | Status: SHIPPED | OUTPATIENT
Start: 2024-06-27

## 2024-06-27 NOTE — PROGRESS NOTES
Chief Complaint   Patient presents with    Migraine without status migrainosus, not intractable, unspe       Patient ID: Jacqueline Coroenl is a 44 y.o. female.    HPI:    The following portions of the patient's history were reviewed and updated as appropriate: allergies, current medications, past family history, past medical history, past social history, past surgical history and problem list.    Interval history:    Review of Systems   Gastrointestinal:  Positive for nausea.   Neurological:  Positive for dizziness, facial asymmetry, light-headedness, numbness and headaches. Negative for tremors, seizures, syncope, speech difficulty and weakness.   Psychiatric/Behavioral: Negative.        History of Present Illness  The patient presents to neurology clinic for follow-up. She has a history of migraine headaches. I saw her initially as a referral from Dr. Ye, neurosurgery, for the reason of facial weakness. She has had headaches since the age of 12 that can occur across her forehead with at times dizziness, light sensitivity, and nausea. She was having at the time of me seeing her about a year and a half ago, 15 headache days a month with 3 intense headaches. She was using Fioricet to treat her headaches. She has a history of severe reactions to triptan medications. She has previously used propranolol but did not notice much of a difference on it or off of it. I gave her total of 1200 mg of gabapentin to be taken as 300 t.i.d. with an additional 300 mg dose with morning, afternoon, or evening dose in the past. She is already on fluoxetine. Topiramate may be a good option for her to try and CGRP medications remain good options for her migraine prophylaxis. I also see if she had the opportunity to try Ubrelvy and Nurtec, which I gave her samples for.    The patient reports that her headaches have increased in frequency, with an increase in intensity over the past few months. She has also noticed increased facial  "pain on the left side. She describes a sensation akin to a road rash or carpet burn on her face, which is sensitive to touch. She also experiences nerve pain in her face, which radiates underneath her eye, orbital bone, cheek, and jaw area. She had a thyroid cyst that was scanned and found to be normal. She has consulted with an endocrinologist, who is attempting to place her on Dexcom 7 for hypoglycemia. She found in a 10-day trial that her blood sugar dropped to 51 at night. She has been experiencing frequent migraine-type headaches this week. She received a Medrol Dosepak from her ophthalmologist a few weeks ago due to the intense pain on the left side of her face. The headache never fully resolved, but it was more tolerable. She has been dealing with a migraine cycle this week, which she can not seem to break. She is a mother to a special needs child, and she does not have family in this area. She is  and has no family in this area. She needs to feel better and stop feeling like this. She experiences fatigue and exhaustion, and she can not get off the couch. Her migraines occur once a month, and she experiences light sensitivity and nausea. When she has a migraine, she is incapacitated, but she manages through headaches. She estimates that she experiences approximately 10 severe migraines a month. She continues to take Fioricet, which works well for her. She has tried Ubrelvy and Nurtec in the past, but they drop her blood pressure and make her feel \"weird\". She takes 600 to 900 mg of gabapentin at night, but she does not care to take it during the day at a 300 mg dose because it makes her a little spacey. It helps with her facial pain. She takes 300 mg in the evening when she knows she is home for the night, and then she will take 200 mg at bedtime. She prefers to take baclofen than Fioricet. She takes a multivitamin.    Supplemental Information  She has had a hysterectomy.      Vitals:    06/27/24 1009 "   BP: 122/74   Pulse: 73   SpO2: 98%       Neurologic Exam     Mental Status   Speech: speech is normal   Level of consciousness: alert    Cranial Nerves     CN II   Visual fields full to confrontation.     CN III, IV, VI   Pupils are equal, round, and reactive to light.  Extraocular motions are normal.     CN V   Facial sensation intact.     CN VII   Facial expression full, symmetric.     CN VIII   Hearing: intact    CN IX, X   Palate: symmetric    CN XI   Right trapezius strength: normal  Left trapezius strength: normal    CN XII   Tongue: not atrophic  Fasciculations: absent  Tongue deviation: none    Motor Exam   Muscle bulk: normal    Strength   Right deltoid: 5/5  Left deltoid: 5/5  Right biceps: 5/5  Left biceps: 5/5  Right triceps: 5/5  Left triceps: 5/5  Right iliopsoas: 5/5  Left iliopsoas: 5/5  Right quadriceps: 5/5  Left quadriceps: 5/5  Right hamstrin/5  Left hamstrin/5  Right anterior tibial: 5/5  Left anterior tibial: 5/5  Right gastroc: 5/5  Left gastroc: 5/5Grip 5 out of 5 bilaterally     Sensory Exam   Left arm light touch: normal  Right leg light touch: normal  Left leg light touch: normal  Right 2nd finger can feel pressure but not touch.     Gait, Coordination, and Reflexes     Coordination   Finger to nose coordination: normal  Heel to shin coordination: normal    Reflexes   Right brachioradialis: 2+  Left brachioradialis: 2+  Right biceps: 2+  Left biceps: 2+  Right patellar: 2+  Left patellar: 2+  Right achilles: 2+  Left achilles: 2+      Physical Exam  Eyes:      Extraocular Movements: EOM normal.      Pupils: Pupils are equal, round, and reactive to light.   Neurological:      Coordination: Finger-Nose-Finger Test and Heel to Shin Test normal.      Deep Tendon Reflexes:      Reflex Scores:       Bicep reflexes are 2+ on the right side and 2+ on the left side.       Brachioradialis reflexes are 2+ on the right side and 2+ on the left side.       Patellar reflexes are 2+ on the right  side and 2+ on the left side.       Achilles reflexes are 2+ on the right side and 2+ on the left side.  Psychiatric:         Speech: Speech normal.         Procedures    Assessment/Plan:    Assessment & Plan  1. Frequent migraine headaches.  The patient was advised to take 300 mg of gabapentin in the evening and 600 mg at bedtime. A prescription for magnesium oxide 400 mg daily and riboflavin 400 mg daily was issued. The initiation of topiramate 50 mg at night for a duration of 2 weeks, after which the dosage will be increased to twice daily. The potential side effects of topiramate were discussed.    Follow-up  A follow-up appointment is scheduled for 10 weeks from now.    I spent 30 minutes caring for this patient on this date of service. This time includes time spent by me in the following activities as necessary: preparing for the visit, reviewing tests, medical records and previous visits, obtaining and/or reviewing a separately obtained history, performing a medically appropriate exam and/or evaluation, counseling and educating the patient, and/or communicating with other healthcare professionals, documenting information in the medical record, independently interpreting results and communicating that information with the patient, and developing a medically appropriate treatment plan with consideration of other conditions, medications, and treatments.       Patient or patient representative verbalized consent for the use of Ambient Listening during the visit with  Miguel Angel Palma MD for chart documentation. 7/12/2024  22:42 EDT     Diagnoses and all orders for this visit:    1. Facial pain  -     gabapentin (NEURONTIN) 300 MG capsule; 300mg in evening and 600mg at bedtime.  Dispense: 120 capsule; Refill: 3    Other orders  -     Riboflavin 400 MG tablet; Take 400 mg by mouth Daily.  Dispense: 100 tablet; Refill: 2  -     magnesium oxide (MAG-OX) 400 MG tablet; Take 1 tablet by mouth Daily.  Dispense: 100 tablet;  Refill: 2  -     topiramate (TOPAMAX) 50 MG tablet; Take 1 tablet by mouth Every Night for 14 days, THEN 1 tablet 2 (Two) Times a Day for 76 days.  Dispense: 166 tablet; Refill: 0           Miguel Angel Palma MD

## 2024-06-28 DIAGNOSIS — E16.2 HYPOGLYCEMIA: Primary | ICD-10-CM

## 2024-06-28 RX ORDER — BLOOD-GLUCOSE SENSOR
1 EACH MISCELLANEOUS
Qty: 3 EACH | Refills: 11 | Status: SHIPPED | OUTPATIENT
Start: 2024-06-28

## 2024-07-25 ENCOUNTER — LAB (OUTPATIENT)
Dept: LAB | Facility: HOSPITAL | Age: 44
End: 2024-07-25
Payer: COMMERCIAL

## 2024-07-25 DIAGNOSIS — E83.110 HEREDITARY HEMOCHROMATOSIS: ICD-10-CM

## 2024-07-25 LAB
BASOPHILS # BLD AUTO: 0.05 10*3/MM3 (ref 0–0.2)
BASOPHILS NFR BLD AUTO: 0.8 % (ref 0–1.5)
DEPRECATED RDW RBC AUTO: 43.9 FL (ref 37–54)
EOSINOPHIL # BLD AUTO: 0.16 10*3/MM3 (ref 0–0.4)
EOSINOPHIL NFR BLD AUTO: 2.5 % (ref 0.3–6.2)
ERYTHROCYTE [DISTWIDTH] IN BLOOD BY AUTOMATED COUNT: 12.7 % (ref 12.3–15.4)
FERRITIN SERPL-MCNC: 90.6 NG/ML (ref 13–150)
HCT VFR BLD AUTO: 39.5 % (ref 34–46.6)
HGB BLD-MCNC: 13.1 G/DL (ref 12–15.9)
IMM GRANULOCYTES # BLD AUTO: 0.02 10*3/MM3 (ref 0–0.05)
IMM GRANULOCYTES NFR BLD AUTO: 0.3 % (ref 0–0.5)
IRON 24H UR-MRATE: 154 MCG/DL (ref 37–145)
IRON SATN MFR SERPL: 58 % (ref 20–50)
LYMPHOCYTES # BLD AUTO: 1.13 10*3/MM3 (ref 0.7–3.1)
LYMPHOCYTES NFR BLD AUTO: 17.4 % (ref 19.6–45.3)
MCH RBC QN AUTO: 31.1 PG (ref 26.6–33)
MCHC RBC AUTO-ENTMCNC: 33.2 G/DL (ref 31.5–35.7)
MCV RBC AUTO: 93.8 FL (ref 79–97)
MONOCYTES # BLD AUTO: 0.49 10*3/MM3 (ref 0.1–0.9)
MONOCYTES NFR BLD AUTO: 7.5 % (ref 5–12)
NEUTROPHILS NFR BLD AUTO: 4.65 10*3/MM3 (ref 1.7–7)
NEUTROPHILS NFR BLD AUTO: 71.5 % (ref 42.7–76)
NRBC BLD AUTO-RTO: 0 /100 WBC (ref 0–0.2)
PLATELET # BLD AUTO: 219 10*3/MM3 (ref 140–450)
PMV BLD AUTO: 10.3 FL (ref 6–12)
RBC # BLD AUTO: 4.21 10*6/MM3 (ref 3.77–5.28)
TIBC SERPL-MCNC: 265 MCG/DL (ref 298–536)
TRANSFERRIN SERPL-MCNC: 178 MG/DL (ref 200–360)
WBC NRBC COR # BLD AUTO: 6.5 10*3/MM3 (ref 3.4–10.8)

## 2024-07-25 PROCEDURE — 84466 ASSAY OF TRANSFERRIN: CPT

## 2024-07-25 PROCEDURE — 85046 RETICYTE/HGB CONCENTRATE: CPT

## 2024-07-25 PROCEDURE — 85025 COMPLETE CBC W/AUTO DIFF WBC: CPT

## 2024-07-25 PROCEDURE — 36415 COLL VENOUS BLD VENIPUNCTURE: CPT

## 2024-07-25 PROCEDURE — 82728 ASSAY OF FERRITIN: CPT

## 2024-07-25 PROCEDURE — 83540 ASSAY OF IRON: CPT

## 2024-07-26 LAB
HGB RETIC QN AUTO: 35.3 PG (ref 29.8–36.1)
IMM RETICS NFR: 9.1 % (ref 3–15.8)
RETICS # AUTO: 0.08 10*6/MM3 (ref 0.02–0.13)
RETICS/RBC NFR AUTO: 1.83 % (ref 0.7–1.9)

## 2024-08-16 ENCOUNTER — OFFICE VISIT (OUTPATIENT)
Dept: ONCOLOGY | Facility: CLINIC | Age: 44
End: 2024-08-16
Payer: COMMERCIAL

## 2024-08-16 ENCOUNTER — INFUSION (OUTPATIENT)
Dept: ONCOLOGY | Facility: HOSPITAL | Age: 44
End: 2024-08-16
Payer: COMMERCIAL

## 2024-08-16 ENCOUNTER — APPOINTMENT (OUTPATIENT)
Dept: LAB | Facility: HOSPITAL | Age: 44
End: 2024-08-16
Payer: COMMERCIAL

## 2024-08-16 VITALS
DIASTOLIC BLOOD PRESSURE: 83 MMHG | RESPIRATION RATE: 16 BRPM | HEIGHT: 68 IN | HEART RATE: 76 BPM | BODY MASS INDEX: 21.82 KG/M2 | TEMPERATURE: 98.2 F | OXYGEN SATURATION: 97 % | SYSTOLIC BLOOD PRESSURE: 126 MMHG | WEIGHT: 144 LBS

## 2024-08-16 DIAGNOSIS — E83.110 HEREDITARY HEMOCHROMATOSIS: Primary | ICD-10-CM

## 2024-08-16 PROBLEM — E28.2 PCOS (POLYCYSTIC OVARIAN SYNDROME): Status: ACTIVE | Noted: 2024-08-16

## 2024-08-16 PROBLEM — R10.9 ABDOMINAL PAIN: Status: RESOLVED | Noted: 2019-12-04 | Resolved: 2024-08-16

## 2024-08-16 PROCEDURE — 99213 OFFICE O/P EST LOW 20 MIN: CPT | Performed by: INTERNAL MEDICINE

## 2024-08-16 PROCEDURE — 99195 PHLEBOTOMY: CPT

## 2024-08-16 NOTE — PROGRESS NOTES
Subjective     REASON FOR FOLLOW UP: Hereditary hemochromatosis (homozygous C282Y mutation).    HISTORY OF PRESENT ILLNESS:      This is a 44-year-old woman status post hysterectomy and homozygote for C282Y hemochromatosis genes returning for lab visit and phlebotomy.    Past Medical History:   Diagnosis Date    Allergic     Anxiety     Chronic cholecystitis     COVID-19 virus detected 2021    Depression     Endometriosis     Headache     Hereditary hemochromatosis     History of chicken pox     History of IBS     History of migraines     [place] (hard of hearing)     Due to ear history, surgery x4    Hypertension     Highest bp around 150/90    Hypoglycemia     Issues have worsened ovet the last 12 months    IBS (irritable bowel syndrome)     Incisional hernia     Infectious mononucleosis     Injury of neck     Latex allergy     Neuropathy     rt arm and  hand    Osteoarthritis     Polycystic ovary syndrome     PONV (postoperative nausea and vomiting)     Spinal headache     Tattoos     Thyroid nodule     Incidental finding MRI 2020 : O.7 cm nodule on right side        Past Surgical History:   Procedure Laterality Date    ABDOMINAL WALL SURGERY  2017    Endometrioma removed from abdominal wall/ scar    ANTERIOR CERVICAL DISCECTOMY W/ FUSION N/A 2020    Procedure: M-8-[id number]-7CERVICAL DISCECTOMY ANTERIOR FUSION WITH INSTRUMENTATION WITH CAGE AND  PLATE;  Surgeon: Shar Muñoz MD;  Location: [facility];  Service: Neurosurgery;  Laterality: N/A;    ANTERIOR CERVICAL DISCECTOMY W/ FUSION Left 2022    Procedure: Anterior cervical discectomy and fusion with cage and plate, cervical four/five;  Surgeon: Shar Muñoz MD;  Location: [facility];  Service: Neurosurgery;  Laterality: Left;     SECTION  ,     CHOLECYSTECTOMY      CYSTOSCOPY N/A 2021    Procedure: CYSTOSCOPY;  Surgeon: Chioma Isabel MD;  Location: [facility] OR;   Service: Robotics - DaVinci;  Laterality: N/A;    DIAGNOSTIC LAPAROSCOPY N/A 12/19/2019    Procedure: DIAGNOSTIC LAPAROSCOPY;  Surgeon: Corine Donovan MD;  Location: [facility] MAIN OR;  Service: General    INNER EAR SURGERY Bilateral 1993, 2002    Tympanoplasty    TOTAL LAPAROSCOPIC HYSTERECTOMY SALPINGECTOMY N/A 09/30/2021    Procedure: TOTAL LAPAROSCOPIC HYSTERECTOMY,  WITH DAVINCI ROBOT;  Surgeon: Chioma Isabel MD;  Location: [facility] MAIN OR;  Service: Robotics - DaVinci;  Laterality: N/A;    TUBAL ABDOMINAL LIGATION      TYMPANOPLASTY Bilateral     several        Current Outpatient Medications on File Prior to Visit   Medication Sig Dispense Refill    acetaminophen (TYLENOL) 325 MG tablet Take 2 tablets by mouth Every 4 (Four) Hours As Needed for Mild Pain .      ALPRAZolam (XANAX) 1 MG tablet TAKE 1/2 TABLET BY MOUTH 4 TIMES A DAY AS NEEDED FOR PANIC      baclofen (LIORESAL) 10 MG tablet TAKE 1 TABLET BY MOUTH THREE TIMES A DAY (Patient taking differently: Take 1 tablet by mouth 3 (Three) Times a Day As Needed for Muscle Spasms.) 60 tablet 3    buPROPion XL (WELLBUTRIN XL) 150 MG 24 hr tablet Take 1 tablet by mouth Every Morning.      butalbital-acetaminophen-caffeine (ORBIVAN) -40 MG capsule capsule Take 1 capsule by mouth Every 4 (Four) Hours As Needed.      Continuous Glucose Sensor (FreeStyle Leslie 3 Sensor) [facility] Use 1 each Every 14 (Fourteen) Days. 3 each 11    FLUoxetine (PROzac) 40 MG capsule Take 1 capsule by mouth Every Morning.      gabapentin (NEURONTIN) 300 MG capsule 300mg in evening and 600mg at bedtime. 120 capsule 3    magnesium oxide (MAG-OX) 400 MG tablet Take 1 tablet by mouth Daily. 100 tablet 2    nebivolol (BYSTOLIC) 5 MG tablet Take 1 tablet by mouth Daily.      promethazine (PHENERGAN) 12.5 MG tablet Take 1 tablet by mouth Every 6 (Six) Hours As Needed for Nausea or Vomiting.      Riboflavin 400 MG tablet Take 400 mg by mouth Daily. 100 tablet 2    traZODone (DESYREL) 50 MG  "tablet Take 1-2 tablets by mouth At Night As Needed.      topiramate (TOPAMAX) 50 MG tablet Take 1 tablet by mouth Every Night for 14 days, THEN 1 tablet 2 (Two) Times a Day for 76 days. (Patient not taking: Reported on 8/16/2024) 166 tablet 0     No current facility-administered medications on file prior to visit.        ALLERGIES:    Allergies   Allergen Reactions    Decadron [Dexamethasone] Rash     Patient reports redness all over and difficulty sleeping, \"steroid induced psychosis\"    Latex Rash     sensitive    Mefenamic Acid Swelling     Facial swelling    Sulfa Antibiotics Rash     Had at a young age vomiting and rash  rash & vomting          Social History     Socioeconomic History    Marital status:      Spouse name: Storm    Number of children: 2    Years of education: College   Tobacco Use    Smoking status: Former     Types: Electronic Cigarette     Passive exposure: Past    Smokeless tobacco: Never    Tobacco comments:     No nicotine in e-cig currently   Vaping Use    Vaping status: Every Day    Start date: 1/1/2018    Substances: CBD, Flavoring    Devices: Refillable tank   Substance and Sexual Activity    Alcohol use: Yes     Comment: Rarely    Drug use: Not Currently     Types: Marijuana     Comment: over 20 years ago    Sexual activity: Yes     Partners: Male     Birth control/protection: Hysterectomy, Surgical     Comment: Hysterectomy 2021        Family History   Problem Relation Age of Onset    Hypertension Mother     Thyroid disease Mother     Hypertension Father     Cancer Maternal Grandfather         Thyroid    Heart disease Other     Malig Hyperthermia Neg Hx         Review of Systems   Constitutional:  Positive for fatigue. Negative for activity change, chills and fever.   HENT:  Negative for mouth sores, trouble swallowing and voice change.    Eyes:  Negative for pain and visual disturbance.   Respiratory:  Negative for cough, shortness of breath and wheezing.    Cardiovascular: " " Negative for chest pain and palpitations.   Gastrointestinal:  Negative for abdominal pain, constipation, diarrhea, nausea and vomiting.   Genitourinary:  Negative for difficulty urinating, frequency and urgency.   Musculoskeletal:  Positive for arthralgias. Negative for joint swelling.   Skin:  Negative for rash.   Neurological:  Negative for dizziness, seizures, weakness and headaches.   Hematological:  Negative for adenopathy. Does not bruise/bleed easily.   Psychiatric/Behavioral:  Negative for behavioral problems and confusion. The patient is not nervous/anxious.         Objective     Vitals:    08/16/24 1126   BP: 126/83   Pulse: 76   Resp: 16   Temp: 98.2 °F (36.8 °C)   TempSrc: Oral   SpO2: 97%   Weight: 65.3 kg (144 lb)   Height: 173 cm (68.11\")   PainSc: 0-No pain             8/16/2024    11:26 AM   Current Status   ECOG score 0       CONSTITUTIONAL: pleasant well-developed adult woman  HEENT: no icterus, no thrush, moist membranes  MUSC: no edema, normal gait  NEURO: alert and oriented x3, normal strength  PSYCH: normal mood and affect      RECENT LABS:  Hematology WBC   Date Value Ref Range Status   07/25/2024 6.50 3.40 - 10.80 10*3/mm3 Final   02/06/2023 5.92 4.5 - 11.0 10*3/uL Final     RBC   Date Value Ref Range Status   07/25/2024 4.21 3.77 - 5.28 10*6/mm3 Final   02/06/2023 4.65 4.0 - 5.2 10*6/uL Final     Hemoglobin   Date Value Ref Range Status   07/25/2024 13.1 12.0 - 15.9 g/dL Final   02/06/2023 14.1 12.0 - 16.0 g/dL Final     Hematocrit   Date Value Ref Range Status   07/25/2024 39.5 34.0 - 46.6 % Final   02/06/2023 42.3 36.0 - 46.0 % Final     Platelets   Date Value Ref Range Status   07/25/2024 219 140 - 450 10*3/mm3 Final   02/06/2023 240 140 - 440 10*3/uL Final        Lab Results   Component Value Date    GLUCOSE 84 06/07/2024    BUN 16 06/07/2024    CREATININE 0.78 06/07/2024    EGFR 96.8 06/07/2024    BCR 20.5 06/07/2024    K 4.5 06/07/2024    CO2 28.9 06/07/2024    CALCIUM 9.1 " 06/07/2024    ALBUMIN 4.2 06/07/2024    BILITOT 0.3 06/07/2024    AST 13 06/07/2024    ALT 12 06/07/2024     Lab Results   Component Value Date    IRON 154 (H) 07/25/2024    TIBC 265 (L) 07/25/2024    FERRITIN 90.60 07/25/2024       Assessment & Plan     1.  Hereditary hemochromatosis  homozygous for C282Y mutation   status post total abdominal hysterectomy and was referred back to us for evaluation of any need for therapeutic phlebotomy.    Ferritin 90 with elevated iron saturation of 58%  PLAN  Phlebotomy today  Every 6 month CBC ferritin iron profile phlebotomy if ferritin over 50 as long as hemoglobin over 12.5  MD visit 12 months

## 2024-09-06 ENCOUNTER — TELEPHONE (OUTPATIENT)
Dept: NEUROLOGY | Facility: CLINIC | Age: 44
End: 2024-09-06
Payer: COMMERCIAL

## 2024-09-06 NOTE — TELEPHONE ENCOUNTER
LVM in regards to r/s appt per pt request. Scheduled for the next available with Dr. Palma along the parameters requested by pt (Friday mornings after 9:30) scheduled pt in OCT and put on wait list.

## 2024-10-11 ENCOUNTER — TELEPHONE (OUTPATIENT)
Dept: NEUROLOGY | Facility: CLINIC | Age: 44
End: 2024-10-11

## 2024-10-11 NOTE — TELEPHONE ENCOUNTER
Caller: CAROLYN Weber call back number:[phone]     PATIENT CALLED REQUESTING TO CANCEL SAME DAY APPT.    Did the patient call AFTER the start time of their scheduled appointment?   NO    Was the patient's appointment rescheduled?  YES    Any additional information:PT CALLED AND HAD TO RESCHEDULE DUE TO HAVING THE STOMACH BUG.    THANK YOU

## 2024-12-03 ENCOUNTER — LAB (OUTPATIENT)
Facility: HOSPITAL | Age: 44
End: 2024-12-03
Payer: COMMERCIAL

## 2024-12-03 ENCOUNTER — OFFICE VISIT (OUTPATIENT)
Dept: ENDOCRINOLOGY | Age: 44
End: 2024-12-03
Payer: COMMERCIAL

## 2024-12-03 VITALS
SYSTOLIC BLOOD PRESSURE: 120 MMHG | HEART RATE: 93 BPM | WEIGHT: 152 LBS | OXYGEN SATURATION: 100 % | TEMPERATURE: 97.5 F | DIASTOLIC BLOOD PRESSURE: 84 MMHG | BODY MASS INDEX: 23.04 KG/M2

## 2024-12-03 DIAGNOSIS — E16.2 HYPOGLYCEMIA: Primary | ICD-10-CM

## 2024-12-03 DIAGNOSIS — E04.1 THYROID CYST: ICD-10-CM

## 2024-12-03 LAB
ALBUMIN SERPL-MCNC: 4.2 G/DL (ref 3.5–5.2)
ALBUMIN/GLOB SERPL: 1.8 G/DL
ALP SERPL-CCNC: 49 U/L (ref 39–117)
ALT SERPL W P-5'-P-CCNC: 11 U/L (ref 1–33)
ANION GAP SERPL CALCULATED.3IONS-SCNC: 6.9 MMOL/L (ref 5–15)
AST SERPL-CCNC: 14 U/L (ref 1–32)
BILIRUB SERPL-MCNC: 0.4 MG/DL (ref 0–1.2)
BUN SERPL-MCNC: 15 MG/DL (ref 6–20)
BUN/CREAT SERPL: 17.4 (ref 7–25)
CALCIUM SPEC-SCNC: 9.3 MG/DL (ref 8.6–10.5)
CHLORIDE SERPL-SCNC: 105 MMOL/L (ref 98–107)
CO2 SERPL-SCNC: 26.1 MMOL/L (ref 22–29)
CREAT SERPL-MCNC: 0.86 MG/DL (ref 0.57–1)
EGFRCR SERPLBLD CKD-EPI 2021: 86.1 ML/MIN/1.73
GLOBULIN UR ELPH-MCNC: 2.3 GM/DL
GLUCOSE SERPL-MCNC: 87 MG/DL (ref 65–99)
HBA1C MFR BLD: 4.9 % (ref 4.8–5.6)
POTASSIUM SERPL-SCNC: 5 MMOL/L (ref 3.5–5.2)
PROT SERPL-MCNC: 6.5 G/DL (ref 6–8.5)
SODIUM SERPL-SCNC: 138 MMOL/L (ref 136–145)

## 2024-12-03 PROCEDURE — 80053 COMPREHEN METABOLIC PANEL: CPT | Performed by: STUDENT IN AN ORGANIZED HEALTH CARE EDUCATION/TRAINING PROGRAM

## 2024-12-03 PROCEDURE — 99214 OFFICE O/P EST MOD 30 MIN: CPT | Performed by: STUDENT IN AN ORGANIZED HEALTH CARE EDUCATION/TRAINING PROGRAM

## 2024-12-03 PROCEDURE — 36415 COLL VENOUS BLD VENIPUNCTURE: CPT | Performed by: STUDENT IN AN ORGANIZED HEALTH CARE EDUCATION/TRAINING PROGRAM

## 2024-12-03 PROCEDURE — 95251 CONT GLUC MNTR ANALYSIS I&R: CPT | Performed by: STUDENT IN AN ORGANIZED HEALTH CARE EDUCATION/TRAINING PROGRAM

## 2024-12-03 PROCEDURE — 83036 HEMOGLOBIN GLYCOSYLATED A1C: CPT | Performed by: STUDENT IN AN ORGANIZED HEALTH CARE EDUCATION/TRAINING PROGRAM

## 2024-12-03 RX ORDER — LANCETS 30 GAUGE
1 EACH MISCELLANEOUS 2 TIMES DAILY
Qty: 100 EACH | Refills: 1 | Status: SHIPPED | OUTPATIENT
Start: 2024-12-03

## 2024-12-03 RX ORDER — BLOOD-GLUCOSE METER
1 KIT MISCELLANEOUS 2 TIMES DAILY
Qty: 1 EACH | Refills: 0 | Status: SHIPPED | OUTPATIENT
Start: 2024-12-03

## 2024-12-03 RX ORDER — SYRING-NEEDL,DISP,INSUL,0.3 ML 30 GX5/16"
1 SYRINGE, EMPTY DISPOSABLE MISCELLANEOUS ONCE
Qty: 1 EACH | Refills: 0 | Status: SHIPPED | OUTPATIENT
Start: 2024-12-03 | End: 2024-12-03

## 2024-12-03 NOTE — ASSESSMENT & PLAN NOTE
Using a sensor  Reports low blood sugar in 50s, advised to check the accuracy of lows with fingerstick  If has low in 50s needs to call the office and notify me.  Emphasized on the importance of low-carb diet  Continue to monitor blood sugars

## 2024-12-03 NOTE — PROGRESS NOTES
Chief Complaint  Thyroid cyst    Subjective        Jacqueline Coronel presents to [facility] for follow up.       History of Present Illness      Has been experiencing low blood sugars  Low blood sugars occur after meals and sometimes at the middle of the night  Reports experiencing low blood sugars in 50s at the middle of the night, using a sensor, does not check the accuracy with fingerstick    Dexcom downloaded and reviewed  Average glucose 102  Standard deviation 17  Time in range 98%  Low 1%  Very low less than 1%  Blood sugar tracing within the target range  Daily blood sugar tracing has a sinus pattern, blood sugar spikes and followed by low blood sugars    Has been busy with moving and did not get a chance to see a dietitian  Reports episodes associated with lightheadedness which improves with eating        Palpitations got better     Hereditary hemochromatosis.  Patient is homozygous for [id number] mutation      Reports history of hysterectomy    Has history of PCOS     Stated that her mother may have Hashimoto's thyroiditis     thyroid ultrasound showed a thyroid cyst  Thyroid  us 5/2024: The right lobe of thyroid measures 5.3 x 1.7 x 2.0 cm. There is a large cystic right thyroid nodule measuring 2.3 x 1.8 x 2.0 cm with echogenic foci with ringdown artifact, most consistent with a colloid cyst.  The left lobe measures 4.2 x 1.5 x 1.3 cm. No nodules are identified.     IMPRESSION:  Impression:     1. 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      Component      Latest Ref Rng 6/7/2024   Glucose      65 - 99 mg/dL 84    BUN      6 - 20 mg/dL 16    Creatinine      0.57 - 1.00 mg/dL 0.78    Sodium      136 - 145 mmol/L 141    Potassium      3.5 - 5.2 mmol/L 4.5    Chloride      98 - 107 mmol/L 104    CO2      22.0 - 29.0 mmol/L 28.9    Calcium      8.6 - 10.5 mg/dL 9.1    Total Protein      6.0 - 8.5 g/dL 6.7    Albumin      3.5 - 5.2 g/dL 4.2    ALT (SGPT)      1 - 33 U/L 12   "  AST (SGOT)      1 - 32 U/L 13    Alkaline Phosphatase      39 - 117 U/L 50    Total Bilirubin      0.0 - 1.2 mg/dL 0.3    Globulin      gm/dL 2.5    A/G Ratio      g/dL 1.7    BUN/Creatinine Ratio      7.0 - 25.0  20.5    Anion Gap      5.0 - 15.0 mmol/L 8.1    eGFR      >60.0 mL/min/1.73 96.8    Gliadin Deamidated Peptide Ab, IgA      0 - 19 units 6    Deaminated Gliadin Ab IgG      0 - 19 units 1    Tissue Transglutaminase IgA      0 - 3 U/mL <2    Tissue Transglutaminase IgG      0 - 5 U/mL <2    Endomysial IgA      Negative  Negative    IgA      87 - 352 mg/dL 238    TSH Baseline      0.270 - 4.200 uIU/mL 1.060    Free T4      0.92 - 1.68 ng/dL 1.05    Thyrotropin Receptor Antibody      0.00 - 1.75 IU/L <1.10    Thyroid Peroxidase Antibody      0 - 34 IU/mL 21    Thyroglobulin Ab      0.0 - 0.9 IU/mL <1.0    Thyroid Stimulating Immunoglobulin      0.00 - 0.55 IU/L <0.10    Cortisol        mcg/dL 22.20    ACTH      7.2 - 63.3 pg/mL 28.4          Objective   Vital Signs:  /84   Pulse 93   Temp 97.5 °F (36.4 °C) (Oral)   Wt 68.9 kg (152 lb)   SpO2 100%   BMI 23.04 kg/m²   Estimated body mass index is 23.04 kg/m² as calculated from the following:    Height as of 8/16/24: 173 cm (68.11\").    Weight as of this encounter: 68.9 kg (152 lb).       BMI is within normal parameters. No other follow-up for BMI required.          Physical Exam  Constitutional:       Appearance: Normal appearance.   HENT:      Head: Normocephalic and atraumatic.   Eyes:      Extraocular Movements: Extraocular movements intact.   Cardiovascular:      Rate and Rhythm: Normal rate and regular rhythm.   Pulmonary:      Effort: Pulmonary effort is normal.      Breath sounds: Normal breath sounds. No wheezing.   Abdominal:      Palpations: Abdomen is soft.      Tenderness: There is no abdominal tenderness.   Musculoskeletal:         General: No swelling. Normal range of motion.      Cervical back: Neck supple. No tenderness. "   Neurological:      Mental Status: She is alert and oriented to person, place, and time.   Psychiatric:         Mood and Affect: Mood normal.        Result Review :  The following data was reviewed by: Mahrokh Nokhbehzaeim, MD on 12/03/2024:  [facility]          1/29/2024    11:08 6/7/2024    08:55   CMP   Glucose 96  84    BUN 12  16    Creatinine 0.78  0.78    EGFR 96.8  96.8    Sodium 139  141    Potassium 4.5  4.5    Chloride 104  104    Calcium 9.2  9.1    Total Protein 6.4  6.7    Albumin 4.0  4.2    Globulin 2.4  2.5    Total Bilirubin 0.2  0.3    Alkaline Phosphatase 55  50    AST (SGOT) 12  13    ALT (SGPT) <5  12    Albumin/Globulin Ratio 1.7  1.7    BUN/Creatinine Ratio 15.4  20.5    Anion Gap 7.0  8.1      CMP          1/29/2024    11:08 6/7/2024    08:55   CMP   Glucose 96  84    BUN 12  16    Creatinine 0.78  0.78    EGFR 96.8  96.8    Sodium 139  141    Potassium 4.5  4.5    Chloride 104  104    Calcium 9.2  9.1    Total Protein 6.4  6.7    Albumin 4.0  4.2    Globulin 2.4  2.5    Total Bilirubin 0.2  0.3    Alkaline Phosphatase 55  50    AST (SGOT) 12  13    ALT (SGPT) <5  12    Albumin/Globulin Ratio 1.7  1.7    BUN/Creatinine Ratio 15.4  20.5    Anion Gap 7.0  8.1       CBC          1/29/2024    10:37 4/22/2024    11:39 7/25/2024    11:44   CBC   WBC 6.74  5.69  6.50    RBC 4.39  4.21  4.21    Hemoglobin 13.8  13.1  13.1    Hematocrit 39.5  39.6  39.5    MCV 90.0  94.1  93.8    MCH 31.4  31.1  31.1    MCHC 34.9  33.1  33.2    RDW 12.3  12.4  12.7    Platelets 193  210  219          TSH          6/7/2024    08:55   TSH   TSH 1.060       Free T4   Date Value Ref Range Status   06/07/2024 1.05 0.92 - 1.68 ng/dL Final      ACTH   Date Value Ref Range Status   06/07/2024 28.4 7.2 - 63.3 pg/mL Final     Comment:     ACTH reference interval for samples collected between 7 and 10 AM.      Cortisol   Date Value Ref Range Status   06/07/2024 22.20   mcg/dL Final      Thyroglobulin Ab   Date Value Ref Range Status    06/07/2024 <1.0 0.0 - 0.9 IU/mL Final     Comment:     Thyroglobulin Antibody measured by [facility]  It should be noted that the presence of thyroglobulin antibodies  may not be pathogenic nor diagnostic, especially at very low  levels. The assay  has found that four percent of  individuals without evidence of thyroid disease or autoimmunity  will have positive TgAb levels up to 4 IU/mL.      Thyroid Peroxidase Antibody   Date Value Ref Range Status   06/07/2024 21 0 - 34 IU/mL Final      Data reviewed : Radiologic studies thyroid ultrasound             Assessment and Plan   Diagnoses and all orders for this visit:    1. Hypoglycemia (Primary)  Assessment & Plan:  Using a sensor  Reports low blood sugar in 50s, advised to check the accuracy of lows with fingerstick  If has low in 50s needs to call the office and notify me.  Emphasized on the importance of low-carb diet  Continue to monitor blood sugars    Orders:  -     Comprehensive Metabolic Panel  -     Hemoglobin A1c  -     Ambulatory Referral to Nutrition Services  -     glucose monitor monitoring kit; Use 1 each 2 (Two) Times a Day. Dispense glucometer with brand based off insurance coverage  Dispense: 1 each; Refill: 0  -     Lancet Device misc; Use 1 each 1 (One) Time for 1 dose.  Dispense: 1 each; Refill: 0  -     Lancets misc; Use 1 each 2 (Two) Times a Day.  Dispense: 100 each; Refill: 1  -     glucose blood test strip; 1 each by Other route 2 (Two) Times a Day.  Dispense: 100 each; Refill: 1    2. Thyroid cyst  Assessment & Plan:  Repeat thyroid ultrasound in May 2025               Follow Up   Return in about 4 months (around 4/3/2025).  Patient was given instructions and counseling regarding her condition or for health maintenance advice. Please see specific information pulled into the AVS if appropriate.

## 2025-04-15 ENCOUNTER — TRANSCRIBE ORDERS (OUTPATIENT)
Dept: ADMINISTRATIVE | Facility: HOSPITAL | Age: 45
End: 2025-04-15
Payer: COMMERCIAL

## 2025-04-15 DIAGNOSIS — Z12.31 ENCOUNTER FOR SCREENING MAMMOGRAM FOR MALIGNANT NEOPLASM OF BREAST: Primary | ICD-10-CM

## 2025-04-16 ENCOUNTER — TELEPHONE (OUTPATIENT)
Dept: ONCOLOGY | Facility: CLINIC | Age: 45
End: 2025-04-16
Payer: COMMERCIAL

## 2025-04-16 NOTE — TELEPHONE ENCOUNTER
Caller: Jacqueline Coronel    Relationship to patient: Self    Best call back number: [phone]    Type of visit: LAB, INFUSION    Requested date: FIRST AVAIL     If rescheduling, when is the original appointment: 1/31 (CAN)

## 2025-04-17 NOTE — TELEPHONE ENCOUNTER
Patient reports she was due for a visit in January, but cancelled due to a scheduling conflict. She had labs drawn with her PCP earlier this month and they felt that she needed to get back in with us. I see that her therapeutic phlebotomy is based on ferritin but I don't see where they had that drawn.

## 2025-04-28 ENCOUNTER — LAB (OUTPATIENT)
Dept: LAB | Facility: HOSPITAL | Age: 45
End: 2025-04-28
Payer: COMMERCIAL

## 2025-04-28 ENCOUNTER — INFUSION (OUTPATIENT)
Dept: ONCOLOGY | Facility: HOSPITAL | Age: 45
End: 2025-04-28
Payer: COMMERCIAL

## 2025-04-28 VITALS
BODY MASS INDEX: 22.89 KG/M2 | TEMPERATURE: 98 F | SYSTOLIC BLOOD PRESSURE: 119 MMHG | HEART RATE: 65 BPM | OXYGEN SATURATION: 99 % | DIASTOLIC BLOOD PRESSURE: 83 MMHG | WEIGHT: 151 LBS | RESPIRATION RATE: 16 BRPM

## 2025-04-28 DIAGNOSIS — E83.110 HEREDITARY HEMOCHROMATOSIS: Primary | ICD-10-CM

## 2025-04-28 DIAGNOSIS — E83.110 HEREDITARY HEMOCHROMATOSIS: ICD-10-CM

## 2025-04-28 LAB
BASOPHILS # BLD AUTO: 0.03 10*3/MM3 (ref 0–0.2)
BASOPHILS NFR BLD AUTO: 0.4 % (ref 0–1.5)
DEPRECATED RDW RBC AUTO: 42.7 FL (ref 37–54)
EOSINOPHIL # BLD AUTO: 0.61 10*3/MM3 (ref 0–0.4)
EOSINOPHIL NFR BLD AUTO: 8.9 % (ref 0.3–6.2)
ERYTHROCYTE [DISTWIDTH] IN BLOOD BY AUTOMATED COUNT: 12.2 % (ref 12.3–15.4)
FERRITIN SERPL-MCNC: 65.1 NG/ML (ref 13–150)
HCT VFR BLD AUTO: 38.1 % (ref 34–46.6)
HGB BLD-MCNC: 12.5 G/DL (ref 12–15.9)
IMM GRANULOCYTES # BLD AUTO: 0.02 10*3/MM3 (ref 0–0.05)
IMM GRANULOCYTES NFR BLD AUTO: 0.3 % (ref 0–0.5)
IRON 24H UR-MRATE: 122 MCG/DL (ref 37–145)
IRON SATN MFR SERPL: 55 % (ref 20–50)
LYMPHOCYTES # BLD AUTO: 1.47 10*3/MM3 (ref 0.7–3.1)
LYMPHOCYTES NFR BLD AUTO: 21.4 % (ref 19.6–45.3)
MCH RBC QN AUTO: 30.8 PG (ref 26.6–33)
MCHC RBC AUTO-ENTMCNC: 32.8 G/DL (ref 31.5–35.7)
MCV RBC AUTO: 93.8 FL (ref 79–97)
MONOCYTES # BLD AUTO: 0.51 10*3/MM3 (ref 0.1–0.9)
MONOCYTES NFR BLD AUTO: 7.4 % (ref 5–12)
NEUTROPHILS NFR BLD AUTO: 4.23 10*3/MM3 (ref 1.7–7)
NEUTROPHILS NFR BLD AUTO: 61.6 % (ref 42.7–76)
NRBC BLD AUTO-RTO: 0 /100 WBC (ref 0–0.2)
PLATELET # BLD AUTO: 176 10*3/MM3 (ref 140–450)
PMV BLD AUTO: 10.4 FL (ref 6–12)
RBC # BLD AUTO: 4.06 10*6/MM3 (ref 3.77–5.28)
TIBC SERPL-MCNC: 221 MCG/DL (ref 298–536)
TRANSFERRIN SERPL-MCNC: 148 MG/DL (ref 200–360)
WBC NRBC COR # BLD AUTO: 6.87 10*3/MM3 (ref 3.4–10.8)

## 2025-04-28 PROCEDURE — 82728 ASSAY OF FERRITIN: CPT

## 2025-04-28 PROCEDURE — 36415 COLL VENOUS BLD VENIPUNCTURE: CPT

## 2025-04-28 PROCEDURE — 85025 COMPLETE CBC W/AUTO DIFF WBC: CPT

## 2025-04-28 PROCEDURE — 84466 ASSAY OF TRANSFERRIN: CPT

## 2025-04-28 PROCEDURE — 99195 PHLEBOTOMY: CPT

## 2025-04-28 PROCEDURE — 83540 ASSAY OF IRON: CPT

## 2025-04-28 RX ORDER — SODIUM CHLORIDE 9 MG/ML
250 INJECTION, SOLUTION INTRAVENOUS ONCE
OUTPATIENT
Start: 2025-07-28

## 2025-08-18 ENCOUNTER — OFFICE VISIT (OUTPATIENT)
Dept: ONCOLOGY | Facility: CLINIC | Age: 45
End: 2025-08-18
Payer: COMMERCIAL

## 2025-08-18 ENCOUNTER — APPOINTMENT (OUTPATIENT)
Dept: ONCOLOGY | Facility: HOSPITAL | Age: 45
End: 2025-08-18
Payer: COMMERCIAL

## 2025-08-18 ENCOUNTER — LAB (OUTPATIENT)
Dept: LAB | Facility: HOSPITAL | Age: 45
End: 2025-08-18
Payer: COMMERCIAL

## 2025-08-18 VITALS
DIASTOLIC BLOOD PRESSURE: 75 MMHG | BODY MASS INDEX: 23.25 KG/M2 | SYSTOLIC BLOOD PRESSURE: 110 MMHG | HEIGHT: 67 IN | OXYGEN SATURATION: 95 % | TEMPERATURE: 98.4 F | RESPIRATION RATE: 16 BRPM | WEIGHT: 148.1 LBS | HEART RATE: 75 BPM

## 2025-08-18 DIAGNOSIS — E83.110 HEREDITARY HEMOCHROMATOSIS: ICD-10-CM

## 2025-08-18 DIAGNOSIS — E83.110 HEREDITARY HEMOCHROMATOSIS: Primary | ICD-10-CM

## 2025-08-18 LAB
ALBUMIN SERPL-MCNC: 4.4 G/DL (ref 3.5–5.2)
ALBUMIN/GLOB SERPL: 2.1 G/DL
ALP SERPL-CCNC: 52 U/L (ref 39–117)
ALT SERPL W P-5'-P-CCNC: 9 U/L (ref 1–33)
ANION GAP SERPL CALCULATED.3IONS-SCNC: 9.8 MMOL/L (ref 5–15)
AST SERPL-CCNC: 14 U/L (ref 1–32)
BASOPHILS # BLD AUTO: 0.05 10*3/MM3 (ref 0–0.2)
BASOPHILS NFR BLD AUTO: 0.6 % (ref 0–1.5)
BILIRUB SERPL-MCNC: 0.4 MG/DL (ref 0–1.2)
BUN SERPL-MCNC: 12.4 MG/DL (ref 6–20)
BUN/CREAT SERPL: 16.3 (ref 7–25)
CALCIUM SPEC-SCNC: 9.5 MG/DL (ref 8.6–10.5)
CHLORIDE SERPL-SCNC: 103 MMOL/L (ref 98–107)
CO2 SERPL-SCNC: 26.2 MMOL/L (ref 22–29)
CREAT SERPL-MCNC: 0.76 MG/DL (ref 0.57–1)
DEPRECATED RDW RBC AUTO: 41.6 FL (ref 37–54)
EGFRCR SERPLBLD CKD-EPI 2021: 99.2 ML/MIN/1.73
EOSINOPHIL # BLD AUTO: 0.24 10*3/MM3 (ref 0–0.4)
EOSINOPHIL NFR BLD AUTO: 2.8 % (ref 0.3–6.2)
ERYTHROCYTE [DISTWIDTH] IN BLOOD BY AUTOMATED COUNT: 12.5 % (ref 12.3–15.4)
FERRITIN SERPL-MCNC: 90 NG/ML (ref 13–150)
GLOBULIN UR ELPH-MCNC: 2.1 GM/DL
GLUCOSE SERPL-MCNC: 111 MG/DL (ref 65–99)
HCT VFR BLD AUTO: 35.6 % (ref 34–46.6)
HGB BLD-MCNC: 12.4 G/DL (ref 12–15.9)
IMM GRANULOCYTES # BLD AUTO: 0.03 10*3/MM3 (ref 0–0.05)
IMM GRANULOCYTES NFR BLD AUTO: 0.3 % (ref 0–0.5)
IRON 24H UR-MRATE: 103 MCG/DL (ref 37–145)
IRON SATN MFR SERPL: 39 % (ref 20–50)
LYMPHOCYTES # BLD AUTO: 1.31 10*3/MM3 (ref 0.7–3.1)
LYMPHOCYTES NFR BLD AUTO: 15.3 % (ref 19.6–45.3)
MCH RBC QN AUTO: 31.7 PG (ref 26.6–33)
MCHC RBC AUTO-ENTMCNC: 34.8 G/DL (ref 31.5–35.7)
MCV RBC AUTO: 91 FL (ref 79–97)
MONOCYTES # BLD AUTO: 0.62 10*3/MM3 (ref 0.1–0.9)
MONOCYTES NFR BLD AUTO: 7.2 % (ref 5–12)
NEUTROPHILS NFR BLD AUTO: 6.33 10*3/MM3 (ref 1.7–7)
NEUTROPHILS NFR BLD AUTO: 73.8 % (ref 42.7–76)
NRBC BLD AUTO-RTO: 0 /100 WBC (ref 0–0.2)
PLATELET # BLD AUTO: 209 10*3/MM3 (ref 140–450)
PMV BLD AUTO: 10.4 FL (ref 6–12)
POTASSIUM SERPL-SCNC: 4.1 MMOL/L (ref 3.5–5.2)
PROT SERPL-MCNC: 6.5 G/DL (ref 6–8.5)
RBC # BLD AUTO: 3.91 10*6/MM3 (ref 3.77–5.28)
SODIUM SERPL-SCNC: 139 MMOL/L (ref 136–145)
TIBC SERPL-MCNC: 265 MCG/DL (ref 298–536)
TRANSFERRIN SERPL-MCNC: 178 MG/DL (ref 200–360)
WBC NRBC COR # BLD AUTO: 8.58 10*3/MM3 (ref 3.4–10.8)

## 2025-08-18 PROCEDURE — 36415 COLL VENOUS BLD VENIPUNCTURE: CPT

## 2025-08-18 PROCEDURE — 99213 OFFICE O/P EST LOW 20 MIN: CPT | Performed by: INTERNAL MEDICINE

## 2025-08-18 PROCEDURE — 80053 COMPREHEN METABOLIC PANEL: CPT

## 2025-08-18 PROCEDURE — 82728 ASSAY OF FERRITIN: CPT

## 2025-08-18 PROCEDURE — 83540 ASSAY OF IRON: CPT

## 2025-08-18 PROCEDURE — 84466 ASSAY OF TRANSFERRIN: CPT

## 2025-08-18 PROCEDURE — 85025 COMPLETE CBC W/AUTO DIFF WBC: CPT

## 2025-08-18 RX ORDER — SODIUM CHLORIDE 9 MG/ML
250 INJECTION, SOLUTION INTRAVENOUS ONCE
OUTPATIENT
Start: 2025-08-18

## 2025-08-18 RX ORDER — SPIRONOLACTONE 25 MG/1
25 TABLET ORAL DAILY
COMMUNITY

## (undated) DEVICE — MARKR SKIN W/RULR

## (undated) DEVICE — DECANTER: Brand: UNBRANDED

## (undated) DEVICE — SOLUTION,WATER,IRRIGATION,1000ML,STERILE: Brand: MEDLINE

## (undated) DEVICE — GLV SURG PREMIERPRO ORTHO LTX PF SZ6.5 BRN

## (undated) DEVICE — DRILL BIT 7080510 11 MM DRILL BIT S

## (undated) DEVICE — ENDOPATH XCEL BLADELESS TROCARS WITH STABILITY SLEEVES: Brand: ENDOPATH XCEL

## (undated) DEVICE — TUBING, SUCTION, 1/4" X 12', STRAIGHT: Brand: MEDLINE

## (undated) DEVICE — SUT MNCRYL 4/0 P3 18IN Y494G

## (undated) DEVICE — CONN TBG Y 5 IN 1 LF STRL

## (undated) DEVICE — Device

## (undated) DEVICE — SUT VIC 0/0 UR6 27IN DYED J603H

## (undated) DEVICE — ELECTRD BLD EXT EDGE 1P COAT 6.5IN STRL

## (undated) DEVICE — PK PROC TURNOVER

## (undated) DEVICE — PK NEURO SPINE 40

## (undated) DEVICE — MANIP UTER RUMI 2 KOH EFFICIENT 3CM BL

## (undated) DEVICE — SUT MNCRYL 4/0 PS2 18 IN

## (undated) DEVICE — CATHETER,FOLEY,100%SILICONE,16FR,10ML,LF: Brand: MEDLINE

## (undated) DEVICE — LAPAROSCOPIC GAS CONDITIONING DEVICE.: Brand: INSUFLOW

## (undated) DEVICE — ADHS SKIN SURG TISS VISC PREMIERPRO EXOFIN HI/VISC FAST/DRY

## (undated) DEVICE — NEEDLE, QUINCKE, 20GX3.5": Brand: MEDLINE

## (undated) DEVICE — GLV SURG PREMIERPRO ORTHO LTX PF SZ8 BRN

## (undated) DEVICE — DRSNG SURESITE WNDW 2.38X2.75

## (undated) DEVICE — SYR LL TP 10ML STRL

## (undated) DEVICE — VIOLET BRAIDED (POLYGLACTIN 910), SYNTHETIC ABSORBABLE SUTURE: Brand: COATED VICRYL

## (undated) DEVICE — SOL IRRIG NACL 1000ML

## (undated) DEVICE — PATIENT RETURN ELECTRODE, SINGLE-USE, CONTACT QUALITY MONITORING, ADULT, WITH 9FT CORD, FOR PATIENTS WEIGING OVER 33LBS. (15KG): Brand: MEGADYNE

## (undated) DEVICE — SEAL

## (undated) DEVICE — DISPOSABLE BIPOLAR FORCEPS 7 3/4" (19.7CM) SCOVILLE BAYONET, 1.5MM TIP AND 12 FT. (3.6M) CABLE: Brand: KIRWAN

## (undated) DEVICE — DRP MICROSCOPE 4 BINOCULAR CV 54X150IN

## (undated) DEVICE — LIMB HOLDER, WRIST/ANKLE: Brand: DEROYAL

## (undated) DEVICE — GOWN,NON-REINFORCED,SIRUS,SET IN SLV,XXL: Brand: MEDLINE

## (undated) DEVICE — SUT VIC 1 CTX 36IN J977H

## (undated) DEVICE — SUT VIC 0 CT1 27IN DYED J340H

## (undated) DEVICE — GAUZE,SPONGE,4"X4",32PLY,XRAY,STRL,LF: Brand: MEDLINE

## (undated) DEVICE — ARM DRAPE

## (undated) DEVICE — PK POSTN TRENGUARD450 PROC

## (undated) DEVICE — BLADELESS OBTURATOR: Brand: WECK VISTA

## (undated) DEVICE — PK GYN LAPAROSCOPY 50

## (undated) DEVICE — 3M™ STERI-DRAPE™ INSTRUMENT POUCH 9097: Brand: 3M™ STERI-DRAPE™

## (undated) DEVICE — TBG IRRI TUR Y/TYP NONVENT 98IN LF

## (undated) DEVICE — SPNG GZ 2S 2X2 8PLY STRL PK/2

## (undated) DEVICE — CVR HNDL LT SURG ACCSSRY BLU STRL

## (undated) DEVICE — SOL IRRIG H2O 1000ML STRL

## (undated) DEVICE — DRAPE SHEET ULTRAGARD: Brand: MEDLINE

## (undated) DEVICE — KT SURG TURNOVER 050

## (undated) DEVICE — 3M™ PATIENT PLATE, CORDED, SPLIT, LARGE, 40 PER CASE, 1179: Brand: 3M™

## (undated) DEVICE — SUT VIC 2/0 CT1 27IN J259H

## (undated) DEVICE — TRAP FLD MINIVAC MEGADYNE 100ML

## (undated) DEVICE — SYR LUERLOK 50ML

## (undated) DEVICE — ENDOPATH XCEL WITH OPTIVIEW TECHNOLOGY BLADELESS TROCARS WITH STABILITY SLEEVES: Brand: ENDOPATH XCEL OPTIVIEW

## (undated) DEVICE — PENCL ES MEGADINE EZ/CLEAN BUTN W/HOLSTR 10FT

## (undated) DEVICE — COVER,MAYO STAND,STERILE: Brand: MEDLINE

## (undated) DEVICE — DRSNG WND BORDR/ADHS NONADHR/GZ LF 4X4IN STRL

## (undated) DEVICE — GOWN,REINFORCE,POLY,SIRUS,BREATH SLV,LG: Brand: MEDLINE

## (undated) DEVICE — SUT VIC 0 CT1 36IN J946H

## (undated) DEVICE — GLV SURG BIOGEL LTX PF 6 1/2

## (undated) DEVICE — TOOL MR8-14MH30 MR8 14CM MATCH 3MM: Brand: MIDAS REX MR8

## (undated) DEVICE — ENDOPATH PNEUMONEEDLE INSUFFLATION NEEDLES WITH LUER LOCK CONNECTORS 120MM: Brand: ENDOPATH

## (undated) DEVICE — TIP COVER ACCESSORY

## (undated) DEVICE — CUFF SCD HEMOFORCE SEQ CALF STD MD

## (undated) DEVICE — KT ANTI FOG W/FLD AND SPNG

## (undated) DEVICE — ENDOPATH XCEL BLUNT TIP TROCARS WITH SMOOTH SLEEVES: Brand: ENDOPATH XCEL

## (undated) DEVICE — SUT VIC 4/0 P3 18IN J494G

## (undated) DEVICE — GENERAL LAPAROSCOPY CDS: Brand: MEDLINE INDUSTRIES, INC.

## (undated) DEVICE — SLV SCD CALF HEMOFORCE DVT THERP REPROC MD

## (undated) DEVICE — UNDYED BRAIDED (POLYGLACTIN 910), SYNTHETIC ABSORBABLE SUTURE: Brand: COATED VICRYL

## (undated) DEVICE — CANNULA SEAL

## (undated) DEVICE — SUT VIC FS2 4/0 27IN J422H

## (undated) DEVICE — SUT GUT PLAIN 3/0 FS2 27IN H822H

## (undated) DEVICE — GLV SURG SENSICARE PI LF PF 8 GRN STRL

## (undated) DEVICE — TP SXN YANKR BULB STRL

## (undated) DEVICE — SMOKE EVACUATION TUBING WITH 7/8 IN TO 1/4 IN REDUCER: Brand: BUFFALO FILTER

## (undated) DEVICE — 3.0MM NEURO (MATCH HEAD) LESS AGGRESSIVE

## (undated) DEVICE — COLUMN DRAPE

## (undated) DEVICE — MANIP UTER RUMI TP 5.1MM 3.75CM

## (undated) DEVICE — RESTRNT LIMB FOAM 2STRAP

## (undated) DEVICE — LO CONTOUR COLLAR: Brand: DEROYAL

## (undated) DEVICE — MANIP UTER RUMI TP 6.7MM 6CM WHT

## (undated) DEVICE — 2, DISPOSABLE SUCTION/IRRIGATOR WITH DISPOSABLE TIP: Brand: STRYKEFLOW